# Patient Record
Sex: MALE | Race: WHITE | NOT HISPANIC OR LATINO | Employment: OTHER | URBAN - METROPOLITAN AREA
[De-identification: names, ages, dates, MRNs, and addresses within clinical notes are randomized per-mention and may not be internally consistent; named-entity substitution may affect disease eponyms.]

---

## 2017-01-20 DIAGNOSIS — M47.816 OSTEOARTHRITIS OF LUMBAR SPINE, UNSPECIFIED SPINAL OSTEOARTHRITIS COMPLICATION STATUS: ICD-10-CM

## 2017-01-23 RX ORDER — HYDROCODONE BITARTRATE AND ACETAMINOPHEN 5; 325 MG/1; MG/1
1 TABLET ORAL EVERY 6 HOURS PRN
Qty: 60 TABLET | Refills: 0 | Status: SHIPPED | OUTPATIENT
Start: 2017-01-23 | End: 2017-02-23 | Stop reason: SDUPTHER

## 2017-01-30 ENCOUNTER — TELEPHONE (OUTPATIENT)
Dept: INTERNAL MEDICINE | Facility: CLINIC | Age: 82
End: 2017-01-30

## 2017-01-30 NOTE — TELEPHONE ENCOUNTER
Spoke with Daiana read the last 3 blood pressures don in our office. She rescheduled his appt for Wednesday and let me know if it still elevated.

## 2017-01-30 NOTE — TELEPHONE ENCOUNTER
----- Message from Bambi Sanchez sent at 1/30/2017 10:43 AM CST -----  Contact: Daiana with Dr. Rickey Temple  The patient's blood pressure is high. First reading 197/112; second 177/101; third 186/103; fourth 177/99; fifth 174/105.  Dr. Temple would like to know what his BP was in December. The patient says that some things happened this morning that spiked his BP.  Please call Dr. Temple's office [phone].    Thanks!

## 2017-02-01 ENCOUNTER — OFFICE VISIT (OUTPATIENT)
Dept: INTERNAL MEDICINE | Facility: CLINIC | Age: 82
End: 2017-02-01
Payer: MEDICARE

## 2017-02-01 ENCOUNTER — TELEPHONE (OUTPATIENT)
Dept: INTERNAL MEDICINE | Facility: CLINIC | Age: 82
End: 2017-02-01

## 2017-02-01 VITALS
BODY MASS INDEX: 26.65 KG/M2 | HEART RATE: 66 BPM | DIASTOLIC BLOOD PRESSURE: 90 MMHG | WEIGHT: 169.81 LBS | HEIGHT: 67 IN | SYSTOLIC BLOOD PRESSURE: 154 MMHG

## 2017-02-01 DIAGNOSIS — I10 ESSENTIAL HYPERTENSION: Primary | ICD-10-CM

## 2017-02-01 PROCEDURE — 99213 OFFICE O/P EST LOW 20 MIN: CPT | Mod: PBBFAC | Performed by: STUDENT IN AN ORGANIZED HEALTH CARE EDUCATION/TRAINING PROGRAM

## 2017-02-01 PROCEDURE — 99999 PR PBB SHADOW E&M-EST. PATIENT-LVL III: CPT | Mod: PBBFAC,GC,, | Performed by: STUDENT IN AN ORGANIZED HEALTH CARE EDUCATION/TRAINING PROGRAM

## 2017-02-01 PROCEDURE — 99212 OFFICE O/P EST SF 10 MIN: CPT | Mod: S$PBB,GC,, | Performed by: STUDENT IN AN ORGANIZED HEALTH CARE EDUCATION/TRAINING PROGRAM

## 2017-02-01 RX ORDER — LISINOPRIL 10 MG/1
10 TABLET ORAL DAILY
Qty: 90 TABLET | Refills: 3 | Status: SHIPPED | OUTPATIENT
Start: 2017-02-01 | End: 2017-03-10 | Stop reason: SDUPTHER

## 2017-02-01 NOTE — PROGRESS NOTES
Clinic Note  02/01/2017    Subjective:       Patient ID: Yogesh Shay is a 82 y.o. male being seen for urgent care appointment (HTN)    Chief Complaint: Hypertension (elevated bp per dentist, haven't been bp med in over a year)    HPI   Patient is a 83 y/o M with HTN, T2DM, OA of lumbar spine who presents with hypertensive urgency noted at outside dentist office (BP: 200/105). He was scheduled for a dental procedure which was delayed to the elevated BP.     On presentation to clinic, his BP was 178/100. On repeat exam/manual, exam was lower (see below). Patient denies mild headaches that resemble sinus headache he has had in the past. Headache severity has not increased in last 12-24 hours. He denies any change in vision, denies chest pain, SOB, light-headedness/dizziness. 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 He describes himself as compliant with medications, low-salt diet (wife also has CKD & HTN) and markedly physically active (own a farm).     On med review, he was noted to take HCTZ 25 mg and atenolol 50mg BID. In the past, patient claims he was on a third medication which was weaned off due to a controlled SBP <120 and associated symptoms of light-headedness and dizziness. This change had occurred over a year ago and in the past year, his BP has been well controlled. Although patient has a cuff at home, he does not use is regularly.     For the purposes of the urgent care visit, other co-morbidities such as T2DM, HLD, or osteoarthritis were not addressed.    Past Medical History   Diagnosis Date    BPH (benign prostatic hypertrophy)     Chronic LBP 8/8/2012    Diabetes mellitus     DJD (degenerative joint disease) of lumbar spine 8/8/2012    Hyperlipidemia     Hypertension     Left lumbar radiculopathy 8/8/2012    Osteoarthritis 8/8/2012     Primary osteoarthritis of both knees 8/8/2012     Past Surgical History   Procedure Laterality Date    Total knee arthroplasty  2/2010     Left    Lumbar fusion  11/2007      L3-L4 Transforaminal Lumbar Interbody Fusion    Cholecystectomy       Patient's Medications   New Prescriptions    LISINOPRIL 10 MG TABLET    Take 1 tablet (10 mg total) by mouth once daily.   Previous Medications    ASPIRIN 81 MG TAB    Take by mouth. 1 Tablet Oral Every day    ATENOLOL (TENORMIN) 50 MG TABLET    TAKE ONE AND ONE-HALF TABLETS TWICE A DAY    DICLOFENAC (CATAFLAM) 50 MG TABLET    TAKE 1 TABLET THREE TIMES A DAY    FLUOROURACIL (EFUDEX) 5 % CREAM    Apply thin film to both hands  2times per day for 2-3 weeks; d/c if area bleeding or ulcerated    FLUTICASONE (FLOVENT HFA) 220 MCG/ACTUATION INHALER    Inhale 2 puffs into the lungs 2 (two) times daily.    GABAPENTIN (NEURONTIN) 300 MG CAPSULE    TAKE 1 CAPSULE EVERY EVENING AT BEDTIME    HYDROCHLOROTHIAZIDE (HYDRODIURIL) 25 MG TABLET    TAKE 1 TABLET DAILY    HYDROCODONE-ACETAMINOPHEN 5-325MG (NORCO) 5-325 MG PER TABLET    Take 1 tablet by mouth every 6 (six) hours as needed for Pain.    METFORMIN (GLUCOPHAGE) 500 MG TABLET    TAKE 1 TABLET TWICE A DAY WITH MEALS    OMEPRAZOLE (PRILOSEC) 20 MG CAPSULE    Take 1 capsule (20 mg total) by mouth once daily.    PRAVASTATIN (PRAVACHOL) 10 MG TABLET    TAKE 1 TABLET DAILY    TAMSULOSIN (FLOMAX) 0.4 MG CP24    TAKE 1 CAPSULE DAILY   Modified Medications    No medications on file   Discontinued Medications    ALBUTEROL 90 MCG/ACTUATION INHALER    Inhale 1-2 puffs into the lungs every 6 (six) hours as needed for Wheezing.       Patient Active Problem List    Diagnosis Date Noted    Diabetic polyneuropathy associated with type 2 diabetes mellitus 08/31/2015    GERD (gastroesophageal reflux disease) 01/30/2015    AR (allergic rhinitis) 01/30/2015    Hyperlipidemia 12/05/2013    Diabetes mellitus with neurological manifestations,  "controlled 03/22/2013    Chronic LBP 08/08/2012     S/P L3-L4 Transforaminal Lumbar Interbody Fusion 11/2007      Left lumbar radiculopathy 08/08/2012    DJD (degenerative joint disease), lumbar 08/08/2012    Primary osteoarthritis of both knees 08/08/2012     S/P Lt TKA 2/10/2010      Osteoarthritis 08/08/2012     Review of Systems   Constitutional: Negative for fever.   Eyes: Negative for blurred vision.   Respiratory: Negative for cough, shortness of breath and wheezing.    Cardiovascular: Negative for chest pain, palpitations, orthopnea and claudication. Leg swelling: intermittently.   Gastrointestinal: Negative for abdominal pain, constipation, diarrhea, heartburn, nausea and vomiting.   Genitourinary: Negative for dysuria.   Musculoskeletal: Back pain: chronic.   Neurological: Negative for dizziness.   Psychiatric/Behavioral: The patient is not nervous/anxious.      Objective:      Visit Vitals    BP (!) 154/90    Pulse 66    Ht 5' 7" (1.702 m)    Wt 77 kg (169 lb 12.8 oz)    BMI 26.59 kg/m2     Body mass index is 26.59 kg/(m^2).    Physical Exam   Constitutional: He is oriented to person, place, and time. He appears well-developed and well-nourished.   HENT:   Head: Normocephalic.   Neck: No JVD present.   Cardiovascular: Normal rate, regular rhythm, normal heart sounds and intact distal pulses.    No murmur heard.  Pulmonary/Chest: Effort normal and breath sounds normal. No respiratory distress. He has no wheezes.   Abdominal: Soft. Bowel sounds are normal. He exhibits no distension. There is no tenderness.   Musculoskeletal: He exhibits no edema.   Neurological: He is alert and oriented to person, place, and time.   Skin: Skin is warm.   Psychiatric: He has a normal mood and affect. His behavior is normal. Judgment and thought content normal.       Repeat BP (manual): 154/90 & 150/105  BP via home cuff: 148/90    Assessment:         1. Essential hypertension           Plan:         Yogesh Wheatley " Laurita is a 82 y.o. male being seen for hypertension    Hypertension  - BP on manual exam: 154/90, likely exacerbated by recent events at work/home and (dental) pain. Scheduled to see dentist in 2 weeks  - adding lisinopril 10mg daily  - continuing atenolol and HCTZ at current dose  - RTC in 4 weeks for BP check; adjust dose of lisinopril as necessary (goal BP < 140/90)  - patient will take BP at home and keep logs (discrepancy between home-cuff & clinic-cuff/manual minimal) when BP is taken at right position  - encouraged low salt diet & physical activity    Patient seen and plan of care discussed with Dr. Jovi Aguila MD  Internal Medicine, PGY-1  [phone]

## 2017-02-01 NOTE — TELEPHONE ENCOUNTER
----- Message from Fadia Romero MA sent at 2/1/2017  1:59 PM CST -----  Contact: Daiana/ Dr.Charles Temple's office [phone]      ----- Message -----     From: Christa Catalan     Sent: 2/1/2017   1:45 PM       To: Reba Lynn Staff    Nurse called to ask for advice about this patient. He is in the office and bp reading is 171/105. She spoke with Hannah on Monday and is calling back to f/u on that conversation. Call forwarded to Hannah.

## 2017-02-01 NOTE — MR AVS SNAPSHOT
[facility]  [address]  [facility] [address]  Phone: [phone]  Fax: [phone]                  Yogesh Shay   2017 3:15 PM   Office Visit    Description:  Male : 1934   Provider:  Ynes Aguila MD   Department:  [facility]           Reason for Visit     Hypertension           Diagnoses this Visit        Comments    Essential hypertension    -  Primary            To Do List           Goals (5 Years of Data)     None      Follow-Up and Disposition     Return in about 4 weeks (around 3/1/2017).       These Medications        Disp Refills Start End    lisinopril 10 MG tablet 90 tablet 3 2017    Take 1 tablet (10 mg total) by mouth once daily. - Oral    [place] [facility] [place] #: [phone]         [facility] On Call     [facility] On Call Nurse Care Line -  Assistance  Registered nurses in the [facility] On Call Center provide clinical advisement, health education, appointment booking, and other advisory services.  Call for this free service at [phone].             Medications           Message regarding Medications     Verify the changes and/or additions to your medication regime listed below are the same as discussed with your clinician today.  If any of these changes or additions are incorrect, please notify your healthcare provider.        START taking these NEW medications        Refills    lisinopril 10 MG tablet 3    Sig: Take 1 tablet (10 mg total) by mouth once daily.    Class: Normal    Route: Oral      STOP taking these medications     albuterol 90 mcg/actuation inhaler Inhale 1-2 puffs into the lungs every 6 (six) hours as needed for Wheezing.           Verify that the below list of medications is an accurate representation of the medications you are currently taking.  If none reported, the list may be blank. If incorrect, please contact your healthcare  "provider. Carry this list with you in case of emergency.           Current Medications     aspirin 81 mg Tab Take by mouth. 1 Tablet Oral Every day    atenolol (TENORMIN) 50 MG tablet TAKE ONE AND ONE-HALF TABLETS TWICE A DAY    diclofenac (CATAFLAM) 50 MG tablet TAKE 1 TABLET THREE TIMES A DAY    fluorouracil (EFUDEX) 5 % cream Apply thin film to both hands  2times per day for 2-3 weeks; d/c if area bleeding or ulcerated    fluticasone (FLOVENT HFA) 220 mcg/actuation inhaler Inhale 2 puffs into the lungs 2 (two) times daily.    gabapentin (NEURONTIN) 300 MG capsule TAKE 1 CAPSULE EVERY EVENING AT BEDTIME    hydrochlorothiazide (HYDRODIURIL) 25 MG tablet TAKE 1 TABLET DAILY    hydrocodone-acetaminophen 5-325mg (NORCO) 5-325 mg per tablet Take 1 tablet by mouth every 6 (six) hours as needed for Pain.    lisinopril 10 MG tablet Take 1 tablet (10 mg total) by mouth once daily.    metformin (GLUCOPHAGE) 500 MG tablet TAKE 1 TABLET TWICE A DAY WITH MEALS    omeprazole (PRILOSEC) 20 MG capsule Take 1 capsule (20 mg total) by mouth once daily.    pravastatin (PRAVACHOL) 10 MG tablet TAKE 1 TABLET DAILY    tamsulosin (FLOMAX) 0.4 mg Cp24 TAKE 1 CAPSULE DAILY           Clinical Reference Information           Vital Signs - Last Recorded  Most recent update: 2/1/2017  4:05 PM by Gianfranco Dunne MA    BP Pulse Ht Wt BMI    (!) 178/100 66 5' 7" (1.702 m) 77 kg (169 lb 12.8 oz) 26.59 kg/m2      Blood Pressure          Most Recent Value    BP  (!)  178/100      Allergies as of 2/1/2017     Sulfa (Sulfonamide Antibiotics)      Immunizations Administered on Date of Encounter - 2/1/2017     None      "

## 2017-02-07 NOTE — PROGRESS NOTES
Preceptor note    Patient's history and physical discussed, please refer to resident physician's note for specific details. Pt seen with resident physician. Medical record reviewed  I agree with resident's assessment and plan.

## 2017-02-15 RX ORDER — METFORMIN HYDROCHLORIDE 500 MG/1
TABLET ORAL
Qty: 180 TABLET | Refills: 3 | Status: SHIPPED | OUTPATIENT
Start: 2017-02-15 | End: 2018-03-27 | Stop reason: SDUPTHER

## 2017-02-23 DIAGNOSIS — M47.816 OSTEOARTHRITIS OF LUMBAR SPINE, UNSPECIFIED SPINAL OSTEOARTHRITIS COMPLICATION STATUS: ICD-10-CM

## 2017-02-23 RX ORDER — HYDROCODONE BITARTRATE AND ACETAMINOPHEN 5; 325 MG/1; MG/1
1 TABLET ORAL EVERY 6 HOURS PRN
Qty: 60 TABLET | Refills: 0 | Status: SHIPPED | OUTPATIENT
Start: 2017-02-23 | End: 2017-03-22 | Stop reason: SDUPTHER

## 2017-02-23 NOTE — TELEPHONE ENCOUNTER
----- Message from Jelani Antony sent at 2/23/2017  9:48 AM CST -----  Contact: self [phone]  Type: Rx    Name of medication(s):  hydrocodone-acetaminophen 5-325mg (NORCO) 5-325 mg per tablet    Is this a refill? New rx? Refill     Who prescribed medication?    Pharmacy Name, Phone, & Location: [facility] on file     Comments: please advise, Thanks !

## 2017-03-10 ENCOUNTER — OFFICE VISIT (OUTPATIENT)
Dept: INTERNAL MEDICINE | Facility: CLINIC | Age: 82
End: 2017-03-10
Payer: MEDICARE

## 2017-03-10 ENCOUNTER — LAB VISIT (OUTPATIENT)
Dept: LAB | Facility: HOSPITAL | Age: 82
End: 2017-03-10
Payer: MEDICARE

## 2017-03-10 VITALS
BODY MASS INDEX: 26.19 KG/M2 | OXYGEN SATURATION: 96 % | HEART RATE: 68 BPM | HEIGHT: 67 IN | WEIGHT: 166.88 LBS | SYSTOLIC BLOOD PRESSURE: 128 MMHG | DIASTOLIC BLOOD PRESSURE: 82 MMHG

## 2017-03-10 DIAGNOSIS — T46.4X5A ACE INHIBITOR NEPHROTOXICITY: ICD-10-CM

## 2017-03-10 DIAGNOSIS — I16.0 HYPERTENSIVE URGENCY: ICD-10-CM

## 2017-03-10 DIAGNOSIS — I10 ESSENTIAL HYPERTENSION: Primary | ICD-10-CM

## 2017-03-10 DIAGNOSIS — N14.19 ACE INHIBITOR NEPHROTOXICITY: ICD-10-CM

## 2017-03-10 LAB
ANION GAP SERPL CALC-SCNC: 11 MMOL/L
BUN SERPL-MCNC: 22 MG/DL
CALCIUM SERPL-MCNC: 9.8 MG/DL
CHLORIDE SERPL-SCNC: 97 MMOL/L
CO2 SERPL-SCNC: 25 MMOL/L
CREAT SERPL-MCNC: 1 MG/DL
EST. GFR  (AFRICAN AMERICAN): >60 ML/MIN/1.73 M^2
EST. GFR  (NON AFRICAN AMERICAN): >60 ML/MIN/1.73 M^2
GLUCOSE SERPL-MCNC: 103 MG/DL
POTASSIUM SERPL-SCNC: 4.4 MMOL/L
SODIUM SERPL-SCNC: 133 MMOL/L

## 2017-03-10 PROCEDURE — 36415 COLL VENOUS BLD VENIPUNCTURE: CPT

## 2017-03-10 PROCEDURE — 99999 PR PBB SHADOW E&M-EST. PATIENT-LVL III: CPT | Mod: PBBFAC,GC,, | Performed by: STUDENT IN AN ORGANIZED HEALTH CARE EDUCATION/TRAINING PROGRAM

## 2017-03-10 PROCEDURE — 80048 BASIC METABOLIC PNL TOTAL CA: CPT

## 2017-03-10 PROCEDURE — 99212 OFFICE O/P EST SF 10 MIN: CPT | Mod: S$PBB,GC,, | Performed by: STUDENT IN AN ORGANIZED HEALTH CARE EDUCATION/TRAINING PROGRAM

## 2017-03-10 RX ORDER — LISINOPRIL 10 MG/1
10 TABLET ORAL DAILY
Qty: 90 TABLET | Refills: 3 | Status: SHIPPED | OUTPATIENT
Start: 2017-03-10 | End: 2018-03-26 | Stop reason: SDUPTHER

## 2017-03-10 NOTE — MR AVS SNAPSHOT
[facility]  [address]  [facility] [address]  Phone: [phone]  Fax: [phone]                  Yogesh Shay   3/10/2017 1:15 PM   Office Visit    Description:  Male : 1934   Provider:  Ynes Aguila MD   Department:  [facility]           Reason for Visit     Follow-up           Diagnoses this Visit        Comments    ACE inhibitor nephrotoxicity    -  Primary            To Do List           Future Appointments        Provider Department Dept Phone    3/10/2017 2:20 PM LAB, APPOINTMENT [facility] [phone]      Goals (5 Years of Data)     None       These Medications        Disp Refills Start End    lisinopril 10 MG tablet 90 tablet 3 3/10/2017 3/10/2018    Take 1 tablet (10 mg total) by mouth once daily. - Oral    Pharmacy: [facility] - [address] #: [phone]         [facility] On Call     [facility] On Call Nurse Care Line -  Assistance  Registered nurses in the [facility] On Call Center provide clinical advisement, health education, appointment booking, and other advisory services.  Call for this free service at [phone].             Medications           Message regarding Medications     Verify the changes and/or additions to your medication regime listed below are the same as discussed with your clinician today.  If any of these changes or additions are incorrect, please notify your healthcare provider.        STOP taking these medications     fluorouracil (EFUDEX) 5 % cream Apply thin film to both hands  2times per day for 2-3 weeks; d/c if area bleeding or ulcerated    fluticasone (FLOVENT HFA) 220 mcg/actuation inhaler Inhale 2 puffs into the lungs 2 (two) times daily.           Verify that the below list of medications is an accurate representation of the medications you are currently taking.  If none reported, the list may be blank. If  "incorrect, please contact your healthcare provider. Carry this list with you in case of emergency.           Current Medications     aspirin 81 mg Tab Take by mouth. 1 Tablet Oral Every day    atenolol (TENORMIN) 50 MG tablet TAKE ONE AND ONE-HALF TABLETS TWICE A DAY    diclofenac (CATAFLAM) 50 MG tablet TAKE 1 TABLET THREE TIMES A DAY    gabapentin (NEURONTIN) 300 MG capsule TAKE 1 CAPSULE EVERY EVENING AT BEDTIME    hydrochlorothiazide (HYDRODIURIL) 25 MG tablet TAKE 1 TABLET DAILY    hydrocodone-acetaminophen 5-325mg (NORCO) 5-325 mg per tablet Take 1 tablet by mouth every 6 (six) hours as needed for Pain.    lisinopril 10 MG tablet Take 1 tablet (10 mg total) by mouth once daily.    metformin (GLUCOPHAGE) 500 MG tablet TAKE 1 TABLET TWICE A DAY WITH MEALS    omeprazole (PRILOSEC) 20 MG capsule Take 1 capsule (20 mg total) by mouth once daily.    pravastatin (PRAVACHOL) 10 MG tablet TAKE 1 TABLET DAILY    tamsulosin (FLOMAX) 0.4 mg Cp24 TAKE 1 CAPSULE DAILY           Clinical Reference Information           Your Vitals Were     BP Pulse Height Weight SpO2 BMI    128/82 (BP Location: Right arm, Patient Position: Sitting, BP Method: Manual) 68 5' 7" (1.702 m) 75.7 kg (166 lb 14.2 oz) 96% 26.14 kg/m2      Blood Pressure          Most Recent Value    BP  128/82      Allergies as of 3/10/2017     Sulfa (Sulfonamide Antibiotics)      Immunizations Administered on Date of Encounter - 3/10/2017     None      Orders Placed During Today's Visit     Future Labs/Procedures Expected by Expires    Basic metabolic panel  3/10/2017 5/9/2018      Language Assistance Services     ATTENTION: Language assistance services are available, free of charge. Please call [phone].      ATENCIÓN: Si habla español, tiene a armas disposición servicios gratuitos de asistencia lingüística. Llame al [phone].     YVETTE Ý: N?u b?n nói Ti?ng Vi?t, có các d?ch v? h? tr? ngôn ng? mi?n phí dành cho b?n. G?i s? [phone].         [facility] complies with applicable Federal civil rights laws and does not discriminate on the basis of race, color, national origin, age, disability, or sex.

## 2017-03-10 NOTE — PROGRESS NOTES
Clinic Progress Note  03/10/2017    Subjective:       Patient ID: Yogesh Shay is a 82 y.o. male being seen for follow up appointment for BP.    Chief Complaint: Follow-up    HPI   Patient is a 83 y/o M with HTN, T2DM, OA/DDD of lumbar spine who presents for follow up for hypertensive urgency.     On last visit 2/1/17, he had presented with elevated BP at dentist's office causing dentist to delay the procedure until BP was better control. His BP in clinic was 178/100 on HCTZ 25mg and atenolol 50mg BID. He did not have any symptoms to suggest end organ damage and inferred lack of health insurance and financial losses as stress adding to elevated BP. Given his h/o diabetes, he was started on lisinopril 10mg daily and encouraged to record daily pressures.     On this visit, he presents with BP: 128/82. He has been compliant with his medications and brings in a log with consistent BP recorded at home, ranging -160s one exception 186. His BP is higher in the morning that it is in the evening. As for symptoms - he has chronic headache and mild dizziness, neither of which have increased in severity or duration. He denies any change in vision, chest pain, SOB. He has mild leg edema that occurs during the day and resolves at night. The edema has also not progressed/worsened.     * Patient did not have new symptoms and co-morbidities (T2DM, chronic pain syndrome, HLD) were not discussed in detail as this was a visit for BP follow up. He will resume care for those by his PCP. No med refills required by pt.    Past Medical History:   Diagnosis Date    BPH (benign prostatic hypertrophy)     Chronic LBP 8/8/2012    Diabetes mellitus     DJD (degenerative joint disease) of lumbar spine 8/8/2012    Hyperlipidemia     Hypertension     Left lumbar radiculopathy 8/8/2012    Osteoarthritis 8/8/2012    Primary osteoarthritis of both knees 8/8/2012     Past Surgical History:   Procedure Laterality Date     CHOLECYSTECTOMY      LUMBAR FUSION  11/2007     L3-L4 Transforaminal Lumbar Interbody Fusion    TOTAL KNEE ARTHROPLASTY  2/2010    Left     Patient's Medications   New Prescriptions    No medications on file   Previous Medications    ASPIRIN 81 MG TAB    Take by mouth. 1 Tablet Oral Every day    ATENOLOL (TENORMIN) 50 MG TABLET    TAKE ONE AND ONE-HALF TABLETS TWICE A DAY    DICLOFENAC (CATAFLAM) 50 MG TABLET    TAKE 1 TABLET THREE TIMES A DAY    GABAPENTIN (NEURONTIN) 300 MG CAPSULE    TAKE 1 CAPSULE EVERY EVENING AT BEDTIME    HYDROCHLOROTHIAZIDE (HYDRODIURIL) 25 MG TABLET    TAKE 1 TABLET DAILY    HYDROCODONE-ACETAMINOPHEN 5-325MG (NORCO) 5-325 MG PER TABLET    Take 1 tablet by mouth every 6 (six) hours as needed for Pain.    METFORMIN (GLUCOPHAGE) 500 MG TABLET    TAKE 1 TABLET TWICE A DAY WITH MEALS    OMEPRAZOLE (PRILOSEC) 20 MG CAPSULE    Take 1 capsule (20 mg total) by mouth once daily.    PRAVASTATIN (PRAVACHOL) 10 MG TABLET    TAKE 1 TABLET DAILY    TAMSULOSIN (FLOMAX) 0.4 MG CP24    TAKE 1 CAPSULE DAILY   Modified Medications    Modified Medication Previous Medication    LISINOPRIL 10 MG TABLET lisinopril 10 MG tablet       Take 1 tablet (10 mg total) by mouth once daily.    Take 1 tablet (10 mg total) by mouth once daily.   Discontinued Medications    FLUOROURACIL (EFUDEX) 5 % CREAM    Apply thin film to both hands  2times per day for 2-3 weeks; d/c if area bleeding or ulcerated    FLUTICASONE (FLOVENT HFA) 220 MCG/ACTUATION INHALER    Inhale 2 puffs into the lungs 2 (two) times daily.     Review of Systems   Constitutional: Negative for fever.   Eyes: Negative for blurred vision.   Respiratory: Negative for cough, shortness of breath and wheezing.    Cardiovascular: Negative for chest pain, palpitations, orthopnea and claudication. Leg swelling: intermittently - during daytime and resolves with night.   Gastrointestinal: Negative for abdominal pain, constipation, diarrhea, heartburn, nausea and  "vomiting.   Genitourinary: Negative for dysuria.   Musculoskeletal: Back pain: chronic.   Neurological: Negative for dizziness and weakness.   Psychiatric/Behavioral: The patient is not nervous/anxious.      Objective:      /82 (BP Location: Right arm, Patient Position: Sitting, BP Method: Manual)  Pulse 68  Ht 5' 7" (1.702 m)  Wt 75.7 kg (166 lb 14.2 oz)  SpO2 96%  BMI 26.14 kg/m2  Body mass index is 26.14 kg/(m^2).    Physical Exam   Constitutional: He is oriented to person, place, and time. He appears well-developed and well-nourished.   HENT:   Head: Normocephalic.   Neck: No JVD present.   Cardiovascular: Normal rate, regular rhythm, normal heart sounds and intact distal pulses.    No murmur heard.  Pulmonary/Chest: Effort normal and breath sounds normal. No respiratory distress. He has no wheezes.   Abdominal: Soft. Bowel sounds are normal. He exhibits no distension. There is no tenderness.   Musculoskeletal: He exhibits no edema.   Neurological: He is alert and oriented to person, place, and time.   Skin: Skin is warm.   Psychiatric: He has a normal mood and affect. His behavior is normal. Judgment and thought content normal.       Repeat BP (manual): 154/90 & 150/105  BP via home cuff: 148/90    Assessment:         1. Essential hypertension  Basic metabolic panel    lisinopril 10 MG tablet   2. Hypertensive urgency  Basic metabolic panel    lisinopril 10 MG tablet         Plan:         Yogesh Shay is a 82 y.o. male being seen for hypertension    Hypertension  - BP on exam: 128/82   - hypertensive urgency -> resolved  - will continue HCTZ 25mg, atenolol 50mg and lisinopril 10mg  - BMP ordered to monitor BUN/Cr s/p ACEi initiation  - recommended continue BP recordings at home (once/day) to note high or low in BP  - patient assured he can go back to dentist and get dental procedure  - encouraged low salt diet & physical activity    May RTC if BP low/high or symptoms develop, else follow up " with Dr. Root (PCP) as scheduled (anticipated appt: Jun '17)    Patient seen and plan of care discussed with Dr. Adeel Aguila MD  Internal Medicine, PGY-1  [phone]

## 2017-03-15 ENCOUNTER — TELEPHONE (OUTPATIENT)
Dept: INTERNAL MEDICINE | Facility: CLINIC | Age: 82
End: 2017-03-15

## 2017-03-15 NOTE — TELEPHONE ENCOUNTER
I called Mr. Shay to let him know the results of his kidney function s/p ACEi initiation were normal. I was unable to speak to him, but conveyed the message to his wife (Lida), who had accompanied patient to the clinic the last 2 times, and patient had agreed to share information with her.  She had no further questions.    Ynes Aguila MD  Internal Medicine, PGY-1  [phone]

## 2017-03-20 RX ORDER — GABAPENTIN 300 MG/1
CAPSULE ORAL
Qty: 90 CAPSULE | Refills: 3 | Status: SHIPPED | OUTPATIENT
Start: 2017-03-20 | End: 2019-02-28 | Stop reason: SDUPTHER

## 2017-03-22 DIAGNOSIS — M47.816 OSTEOARTHRITIS OF LUMBAR SPINE, UNSPECIFIED SPINAL OSTEOARTHRITIS COMPLICATION STATUS: ICD-10-CM

## 2017-03-22 RX ORDER — HYDROCODONE BITARTRATE AND ACETAMINOPHEN 5; 325 MG/1; MG/1
1 TABLET ORAL EVERY 6 HOURS PRN
Qty: 60 TABLET | Refills: 0 | Status: SHIPPED | OUTPATIENT
Start: 2017-03-22 | End: 2017-04-24 | Stop reason: SDUPTHER

## 2017-03-22 NOTE — TELEPHONE ENCOUNTER
----- Message from Liat Gutierrez sent at 3/22/2017 10:56 AM CDT -----  Contact: Lida/ Wife/ [phone]   Type: Rx    Name of medication(s): hydrocodone-acetaminophen 5-325mg (NORCO) 5-325 mg per tablet    Is this a refill? New rx? Refill     Who prescribed medication? Dr. Root     Pharmacy Name, Phone, & Location: [facility]     Comments:pt wife is calling to have a refill on the medication above. Please call and advise       Thank you

## 2017-04-11 DIAGNOSIS — K21.9 GERD (GASTROESOPHAGEAL REFLUX DISEASE): ICD-10-CM

## 2017-04-11 RX ORDER — OMEPRAZOLE 20 MG/1
20 CAPSULE, DELAYED RELEASE ORAL DAILY
Qty: 90 CAPSULE | Refills: 3 | Status: SHIPPED | OUTPATIENT
Start: 2017-04-11 | End: 2018-03-18 | Stop reason: SDUPTHER

## 2017-04-21 DIAGNOSIS — M47.816 OSTEOARTHRITIS OF LUMBAR SPINE, UNSPECIFIED SPINAL OSTEOARTHRITIS COMPLICATION STATUS: ICD-10-CM

## 2017-04-21 RX ORDER — HYDROCODONE BITARTRATE AND ACETAMINOPHEN 5; 325 MG/1; MG/1
1 TABLET ORAL EVERY 6 HOURS PRN
Qty: 60 TABLET | Refills: 0 | Status: CANCELLED | OUTPATIENT
Start: 2017-04-21

## 2017-04-21 NOTE — TELEPHONE ENCOUNTER
----- Message from Bambi Sanchez sent at 4/21/2017  9:08 AM CDT -----  Contact: Mrs. Shay   Refill    hydrocodone-acetaminophen 5-325mg (NORCO) 5-325 mg per tablet   Sig - Route: Take 1 tablet by mouth every 6 (six) hours as needed for     [facility] [place] [address] [phone] (Phone)  [phone] (Fax)    Please call the patient to let him know the prescription was sent at [phone].    Thanks!

## 2017-04-24 DIAGNOSIS — M47.816 OSTEOARTHRITIS OF LUMBAR SPINE, UNSPECIFIED SPINAL OSTEOARTHRITIS COMPLICATION STATUS: ICD-10-CM

## 2017-04-24 RX ORDER — HYDROCODONE BITARTRATE AND ACETAMINOPHEN 5; 325 MG/1; MG/1
1 TABLET ORAL EVERY 6 HOURS PRN
Qty: 60 TABLET | Refills: 0 | Status: SHIPPED | OUTPATIENT
Start: 2017-04-24 | End: 2017-05-22 | Stop reason: SDUPTHER

## 2017-04-24 NOTE — TELEPHONE ENCOUNTER
----- Message from Fransico Agustin MA sent at 4/24/2017  9:01 AM CDT -----  Contact: wife / Lida - please call patient at [phone]  Please advise the status if the Refill request for hydrocodone-acetaminophen 5-325mg (NORCO) 5-325 mg per tablet. Please call. Thanks!

## 2017-05-16 ENCOUNTER — LAB VISIT (OUTPATIENT)
Dept: LAB | Facility: HOSPITAL | Age: 82
End: 2017-05-16
Attending: INTERNAL MEDICINE
Payer: MEDICARE

## 2017-05-16 DIAGNOSIS — E11.49 DIABETES MELLITUS WITH NEUROLOGICAL MANIFESTATIONS, CONTROLLED: ICD-10-CM

## 2017-05-16 DIAGNOSIS — E11.42 CONTROLLED TYPE 2 DIABETES MELLITUS WITH DIABETIC POLYNEUROPATHY, WITHOUT LONG-TERM CURRENT USE OF INSULIN: ICD-10-CM

## 2017-05-16 LAB
ALBUMIN SERPL BCP-MCNC: 3.9 G/DL
ALP SERPL-CCNC: 53 U/L
ALT SERPL W/O P-5'-P-CCNC: 11 U/L
ANION GAP SERPL CALC-SCNC: 11 MMOL/L
AST SERPL-CCNC: 17 U/L
BASOPHILS # BLD AUTO: 0.01 K/UL
BASOPHILS NFR BLD: 0.2 %
BILIRUB SERPL-MCNC: 0.4 MG/DL
BUN SERPL-MCNC: 18 MG/DL
CALCIUM SERPL-MCNC: 9.6 MG/DL
CHLORIDE SERPL-SCNC: 99 MMOL/L
CHOLEST/HDLC SERPL: 3.3 {RATIO}
CO2 SERPL-SCNC: 24 MMOL/L
CREAT SERPL-MCNC: 0.9 MG/DL
DIFFERENTIAL METHOD: ABNORMAL
EOSINOPHIL # BLD AUTO: 0.1 K/UL
EOSINOPHIL NFR BLD: 1.6 %
ERYTHROCYTE [DISTWIDTH] IN BLOOD BY AUTOMATED COUNT: 14.1 %
EST. GFR  (AFRICAN AMERICAN): >60 ML/MIN/1.73 M^2
EST. GFR  (NON AFRICAN AMERICAN): >60 ML/MIN/1.73 M^2
GLUCOSE SERPL-MCNC: 117 MG/DL
HCT VFR BLD AUTO: 37.8 %
HDL/CHOLESTEROL RATIO: 30.7 %
HDLC SERPL-MCNC: 153 MG/DL
HDLC SERPL-MCNC: 47 MG/DL
HGB BLD-MCNC: 12.8 G/DL
LDLC SERPL CALC-MCNC: 79.6 MG/DL
LYMPHOCYTES # BLD AUTO: 1.9 K/UL
LYMPHOCYTES NFR BLD: 34.2 %
MCH RBC QN AUTO: 32.4 PG
MCHC RBC AUTO-ENTMCNC: 33.9 %
MCV RBC AUTO: 96 FL
MONOCYTES # BLD AUTO: 0.5 K/UL
MONOCYTES NFR BLD: 8.7 %
NEUTROPHILS # BLD AUTO: 3.1 K/UL
NEUTROPHILS NFR BLD: 55.1 %
NONHDLC SERPL-MCNC: 106 MG/DL
PLATELET # BLD AUTO: 192 K/UL
PMV BLD AUTO: 9.9 FL
POTASSIUM SERPL-SCNC: 4.3 MMOL/L
PROT SERPL-MCNC: 7.3 G/DL
RBC # BLD AUTO: 3.95 M/UL
SODIUM SERPL-SCNC: 134 MMOL/L
TRIGL SERPL-MCNC: 132 MG/DL
WBC # BLD AUTO: 5.61 K/UL

## 2017-05-16 PROCEDURE — 80053 COMPREHEN METABOLIC PANEL: CPT

## 2017-05-16 PROCEDURE — 36415 COLL VENOUS BLD VENIPUNCTURE: CPT | Mod: PO

## 2017-05-16 PROCEDURE — 83036 HEMOGLOBIN GLYCOSYLATED A1C: CPT

## 2017-05-16 PROCEDURE — 85025 COMPLETE CBC W/AUTO DIFF WBC: CPT

## 2017-05-16 PROCEDURE — 80061 LIPID PANEL: CPT

## 2017-05-17 LAB
ESTIMATED AVG GLUCOSE: 126 MG/DL
ESTIMATED AVG GLUCOSE: 126 MG/DL
HBA1C MFR BLD HPLC: 6 %
HBA1C MFR BLD HPLC: 6 %

## 2017-05-22 ENCOUNTER — OFFICE VISIT (OUTPATIENT)
Dept: INTERNAL MEDICINE | Facility: CLINIC | Age: 82
End: 2017-05-22
Payer: MEDICARE

## 2017-05-22 VITALS
SYSTOLIC BLOOD PRESSURE: 118 MMHG | BODY MASS INDEX: 25.26 KG/M2 | DIASTOLIC BLOOD PRESSURE: 78 MMHG | HEIGHT: 67 IN | WEIGHT: 160.94 LBS

## 2017-05-22 DIAGNOSIS — E11.42 CONTROLLED TYPE 2 DIABETES MELLITUS WITH DIABETIC POLYNEUROPATHY, WITHOUT LONG-TERM CURRENT USE OF INSULIN: Primary | ICD-10-CM

## 2017-05-22 DIAGNOSIS — M47.816 OSTEOARTHRITIS OF LUMBAR SPINE, UNSPECIFIED SPINAL OSTEOARTHRITIS COMPLICATION STATUS: ICD-10-CM

## 2017-05-22 PROCEDURE — G0009 ADMIN PNEUMOCOCCAL VACCINE: HCPCS | Mod: PBBFAC

## 2017-05-22 PROCEDURE — 99999 PR PBB SHADOW E&M-EST. PATIENT-LVL III: CPT | Mod: PBBFAC,,, | Performed by: INTERNAL MEDICINE

## 2017-05-22 PROCEDURE — 99214 OFFICE O/P EST MOD 30 MIN: CPT | Mod: S$PBB,,, | Performed by: INTERNAL MEDICINE

## 2017-05-22 PROCEDURE — 99213 OFFICE O/P EST LOW 20 MIN: CPT | Mod: PBBFAC | Performed by: INTERNAL MEDICINE

## 2017-05-22 PROCEDURE — 90732 PPSV23 VACC 2 YRS+ SUBQ/IM: CPT | Mod: PBBFAC | Performed by: INTERNAL MEDICINE

## 2017-05-22 RX ORDER — HYDROCODONE BITARTRATE AND ACETAMINOPHEN 5; 325 MG/1; MG/1
1 TABLET ORAL EVERY 6 HOURS PRN
Qty: 60 TABLET | Refills: 0 | Status: SHIPPED | OUTPATIENT
Start: 2017-05-22 | End: 2017-06-21 | Stop reason: SDUPTHER

## 2017-05-23 PROCEDURE — G0009 ADMIN PNEUMOCOCCAL VACCINE: HCPCS | Mod: S$PBB,,, | Performed by: INTERNAL MEDICINE

## 2017-05-23 NOTE — PROGRESS NOTES
Subjective:       Patient ID: Yogesh Shay is a 82 y.o. male.    Chief Complaint: Diabetes    Diabetes   He presents for his follow-up diabetic visit. He has type 2 diabetes mellitus. His disease course has been stable. There are no hypoglycemic associated symptoms. There are no diabetic associated symptoms. Pertinent negatives for diabetes include no chest pain, no polydipsia, no polyphagia and no polyuria. There are no hypoglycemic complications. Symptoms are stable.     Review of Systems   Cardiovascular: Negative for chest pain.   Endocrine: Negative for polydipsia, polyphagia and polyuria.       Objective:      Physical Exam   Constitutional: He is oriented to person, place, and time. He appears well-developed and well-nourished. No distress.   HENT:   Head: Normocephalic and atraumatic.   Mouth/Throat: Oropharynx is clear and moist.   Eyes: Conjunctivae are normal. Pupils are equal, round, and reactive to light.   Neck: Normal range of motion. Neck supple.   Cardiovascular: Normal rate, regular rhythm and normal heart sounds.    Pulmonary/Chest: Effort normal and breath sounds normal. He has no wheezes.   Abdominal: Soft. Bowel sounds are normal. There is no tenderness.   Musculoskeletal: Normal range of motion. He exhibits no edema or tenderness.   Neurological: He is alert and oriented to person, place, and time. No cranial nerve deficit.   Skin: No erythema.   Psychiatric: He has a normal mood and affect.   Vitals reviewed.      Assessment:       1. Controlled type 2 diabetes mellitus with diabetic polyneuropathy, without long-term current use of insulin    2. Osteoarthritis of lumbar spine, unspecified spinal osteoarthritis complication status        Plan:       Yogesh was seen today for diabetes.    Diagnoses and all orders for this visit:    Controlled type 2 diabetes mellitus with diabetic polyneuropathy, without long-term current use of insulin  -     hydrocodone-acetaminophen 5-325mg (NORCO)  5-325 mg per tablet; Take 1 tablet by mouth every 6 (six) hours as needed for Pain.  -     Comprehensive metabolic panel; Future  -     Hemoglobin A1c; Future  -     Lipid panel; Future  -     CBC auto differential; Future  -     Pneumococcal Polysaccharide Vaccine (23 Valent) (SQ/IM)    Osteoarthritis of lumbar spine, unspecified spinal osteoarthritis complication status  -     hydrocodone-acetaminophen 5-325mg (NORCO) 5-325 mg per tablet; Take 1 tablet by mouth every 6 (six) hours as needed for Pain.  -     Pneumococcal Polysaccharide Vaccine (23 Valent) (SQ/IM)        Return in about 6 months (around 11/22/2017) for F/U APPOINTMENT WITH ME.

## 2017-06-14 RX ORDER — PRAVASTATIN SODIUM 10 MG/1
TABLET ORAL
Qty: 90 TABLET | Refills: 3 | Status: SHIPPED | OUTPATIENT
Start: 2017-06-14 | End: 2018-06-08 | Stop reason: SDUPTHER

## 2017-06-14 RX ORDER — DICLOFENAC POTASSIUM 50 MG/1
TABLET, FILM COATED ORAL
Qty: 270 TABLET | Refills: 3 | Status: SHIPPED | OUTPATIENT
Start: 2017-06-14 | End: 2018-06-08 | Stop reason: SDUPTHER

## 2017-06-14 RX ORDER — HYDROCHLOROTHIAZIDE 25 MG/1
TABLET ORAL
Qty: 90 TABLET | Refills: 3 | Status: SHIPPED | OUTPATIENT
Start: 2017-06-14 | End: 2018-01-31

## 2017-06-21 DIAGNOSIS — E11.42 CONTROLLED TYPE 2 DIABETES MELLITUS WITH DIABETIC POLYNEUROPATHY, WITHOUT LONG-TERM CURRENT USE OF INSULIN: ICD-10-CM

## 2017-06-21 DIAGNOSIS — M47.816 OSTEOARTHRITIS OF LUMBAR SPINE, UNSPECIFIED SPINAL OSTEOARTHRITIS COMPLICATION STATUS: ICD-10-CM

## 2017-06-21 NOTE — TELEPHONE ENCOUNTER
----- Message from Jelani Antony sent at 6/21/2017 11:40 AM CDT -----  Contact: self [phone]  Type: Rx    Name of medication(s): hydrocodone-acetaminophen 5-325mg (NORCO) 5-325 mg per tablet    Is this a refill? New rx? Refill     Who prescribed medication?    Pharmacy Name, Phone, & Location:  [facility] on file     Comments: please advise, Thanks !  Please advise , Thanks !

## 2017-06-22 RX ORDER — HYDROCODONE BITARTRATE AND ACETAMINOPHEN 5; 325 MG/1; MG/1
1 TABLET ORAL EVERY 6 HOURS PRN
Qty: 60 TABLET | Refills: 0 | Status: SHIPPED | OUTPATIENT
Start: 2017-06-22 | End: 2017-07-21 | Stop reason: SDUPTHER

## 2017-07-07 RX ORDER — TAMSULOSIN HYDROCHLORIDE 0.4 MG/1
CAPSULE ORAL
Qty: 90 CAPSULE | Refills: 2 | Status: SHIPPED | OUTPATIENT
Start: 2017-07-07 | End: 2018-04-02 | Stop reason: SDUPTHER

## 2017-07-07 RX ORDER — ATENOLOL 50 MG/1
TABLET ORAL
Qty: 270 TABLET | Refills: 2 | Status: SHIPPED | OUTPATIENT
Start: 2017-07-07 | End: 2018-04-02 | Stop reason: SDUPTHER

## 2017-07-21 DIAGNOSIS — E11.42 CONTROLLED TYPE 2 DIABETES MELLITUS WITH DIABETIC POLYNEUROPATHY, WITHOUT LONG-TERM CURRENT USE OF INSULIN: ICD-10-CM

## 2017-07-21 DIAGNOSIS — M47.816 OSTEOARTHRITIS OF LUMBAR SPINE, UNSPECIFIED SPINAL OSTEOARTHRITIS COMPLICATION STATUS: ICD-10-CM

## 2017-07-21 NOTE — TELEPHONE ENCOUNTER
----- Message from John Yu sent at 7/21/2017 12:51 PM CDT -----  Contact: Lida Holden [phone]  Type: Rx    Name of medication(s): hydrocodone-acetaminophen 5-325mg (NORCO) 5-325 mg per tablet    Is this a refill? New rx? Refill    Who prescribed medication? Dr Root    Pharmacy Name, Phone, & Location:  [facility] on file    Comments:Requestig a call back, advice    Thanks

## 2017-07-24 RX ORDER — HYDROCODONE BITARTRATE AND ACETAMINOPHEN 5; 325 MG/1; MG/1
1 TABLET ORAL EVERY 6 HOURS PRN
Qty: 60 TABLET | Refills: 0 | Status: SHIPPED | OUTPATIENT
Start: 2017-07-24 | End: 2017-08-22 | Stop reason: SDUPTHER

## 2017-08-22 DIAGNOSIS — M47.816 OSTEOARTHRITIS OF LUMBAR SPINE, UNSPECIFIED SPINAL OSTEOARTHRITIS COMPLICATION STATUS: ICD-10-CM

## 2017-08-22 DIAGNOSIS — E11.42 CONTROLLED TYPE 2 DIABETES MELLITUS WITH DIABETIC POLYNEUROPATHY, WITHOUT LONG-TERM CURRENT USE OF INSULIN: ICD-10-CM

## 2017-08-22 RX ORDER — HYDROCODONE BITARTRATE AND ACETAMINOPHEN 5; 325 MG/1; MG/1
1 TABLET ORAL EVERY 6 HOURS PRN
Qty: 60 TABLET | Refills: 0 | Status: SHIPPED | OUTPATIENT
Start: 2017-08-22 | End: 2017-09-22 | Stop reason: SDUPTHER

## 2017-08-22 NOTE — TELEPHONE ENCOUNTER
----- Message from Kimberley Samson sent at 8/22/2017  9:01 AM CDT -----  Contact: Wife/Lida/[phone]  RX request - refill or new RX.  Is this a refill or new RX:  Refill  RX name and strength: hydrocodone-acetaminophen 5-325mg (NORCO) 5-325 mg per tablet  Directions: Take 1 tablet by mouth every 6 (six) hours as needed for Pain  Is this a 30 day or 90 day RX:    Pharmacy name and phone #: [facility] - [place]         [phone]   Comments:  Please call pt when the rx has been sent to the pharmacy        Thanks!!!

## 2017-09-22 DIAGNOSIS — M47.816 OSTEOARTHRITIS OF LUMBAR SPINE, UNSPECIFIED SPINAL OSTEOARTHRITIS COMPLICATION STATUS: ICD-10-CM

## 2017-09-22 DIAGNOSIS — E11.42 CONTROLLED TYPE 2 DIABETES MELLITUS WITH DIABETIC POLYNEUROPATHY, WITHOUT LONG-TERM CURRENT USE OF INSULIN: ICD-10-CM

## 2017-09-22 NOTE — TELEPHONE ENCOUNTER
----- Message from Ej Santos sent at 9/22/2017  1:04 PM CDT -----  Contact: Leoa/ Wife/ [phone] cell  Type: Rx    Name of medication(s): hydrocodone-acetaminophen 5-325mg (NORCO) 5-325 mg per tablet    Is this a refill? New rx? refill    Who prescribed medication? Dr. Root    Pharmacy Name, Phone, & Location: [facility]    Comments: Pt's wife is calling to request a refill on the medication above.  She would like a call back when the medication has been sent in.  Please call and advise.    Thank you

## 2017-09-23 RX ORDER — HYDROCODONE BITARTRATE AND ACETAMINOPHEN 5; 325 MG/1; MG/1
1 TABLET ORAL EVERY 6 HOURS PRN
Qty: 60 TABLET | Refills: 0 | Status: SHIPPED | OUTPATIENT
Start: 2017-09-23 | End: 2017-10-19 | Stop reason: SDUPTHER

## 2017-10-19 DIAGNOSIS — M47.816 OSTEOARTHRITIS OF LUMBAR SPINE, UNSPECIFIED SPINAL OSTEOARTHRITIS COMPLICATION STATUS: ICD-10-CM

## 2017-10-19 DIAGNOSIS — E11.42 CONTROLLED TYPE 2 DIABETES MELLITUS WITH DIABETIC POLYNEUROPATHY, WITHOUT LONG-TERM CURRENT USE OF INSULIN: ICD-10-CM

## 2017-10-19 RX ORDER — HYDROCODONE BITARTRATE AND ACETAMINOPHEN 5; 325 MG/1; MG/1
1 TABLET ORAL EVERY 6 HOURS PRN
Qty: 60 TABLET | Refills: 0 | Status: SHIPPED | OUTPATIENT
Start: 2017-10-19 | End: 2017-11-21 | Stop reason: SDUPTHER

## 2017-10-19 NOTE — TELEPHONE ENCOUNTER
----- Message from Yesenia Haider sent at 10/19/2017 10:52 AM CDT -----  Contact: Spouse/ Lida [phone]  Prescription Request:     Name of medication: hydrocodone-acetaminophen 5-325mg (NORCO) 5-325 mg per tablet    Reason for request: Refill    Ph[facility] [place] [facility] [address]    Please advise. Patient also needs his annual scheduled that was due in November but nothing shows available until January.    Thank You

## 2017-11-03 ENCOUNTER — TELEPHONE (OUTPATIENT)
Dept: INTERNAL MEDICINE | Facility: CLINIC | Age: 82
End: 2017-11-03

## 2017-11-03 NOTE — TELEPHONE ENCOUNTER
pls advise pt stated he would like to come in for a bp check no appts available until next year and pt only wants to see you

## 2017-11-03 NOTE — TELEPHONE ENCOUNTER
----- Message from Christa Catalan sent at 11/3/2017  9:04 AM CDT -----  Contact: [phone] patient's cell   Pt called to request an appt for next week because his bp is fluctuating and he would like to come in for a bp check. Your next available ep is not until January. Pt refuses to see anyone else. Please call to advise him whether or not you can fit him in.

## 2017-11-06 NOTE — TELEPHONE ENCOUNTER
Pt angle ferguson fluctuating offered appt will call back Wednesday to see if any improvement he is busy this week.painting the front porch.

## 2017-11-08 ENCOUNTER — IMMUNIZATION (OUTPATIENT)
Dept: INTERNAL MEDICINE | Facility: CLINIC | Age: 82
End: 2017-11-08
Payer: MEDICARE

## 2017-11-08 ENCOUNTER — OFFICE VISIT (OUTPATIENT)
Dept: INTERNAL MEDICINE | Facility: CLINIC | Age: 82
End: 2017-11-08
Payer: MEDICARE

## 2017-11-08 VITALS
SYSTOLIC BLOOD PRESSURE: 178 MMHG | DIASTOLIC BLOOD PRESSURE: 96 MMHG | HEIGHT: 67 IN | OXYGEN SATURATION: 98 % | HEART RATE: 60 BPM | WEIGHT: 157.63 LBS | BODY MASS INDEX: 24.74 KG/M2

## 2017-11-08 DIAGNOSIS — I95.2 HYPOTENSION DUE TO MEDICATION: ICD-10-CM

## 2017-11-08 DIAGNOSIS — I10 ESSENTIAL HYPERTENSION: Primary | ICD-10-CM

## 2017-11-08 PROCEDURE — G0008 ADMIN INFLUENZA VIRUS VAC: HCPCS | Mod: PBBFAC

## 2017-11-08 PROCEDURE — 99999 PR PBB SHADOW E&M-EST. PATIENT-LVL III: CPT | Mod: PBBFAC,,, | Performed by: INTERNAL MEDICINE

## 2017-11-08 PROCEDURE — 99213 OFFICE O/P EST LOW 20 MIN: CPT | Mod: S$PBB,,, | Performed by: INTERNAL MEDICINE

## 2017-11-08 PROCEDURE — 99213 OFFICE O/P EST LOW 20 MIN: CPT | Mod: PBBFAC | Performed by: INTERNAL MEDICINE

## 2017-11-08 NOTE — PROGRESS NOTES
Subjective:       Patient ID: Yogesh Shay is a 83 y.o. male.    Chief Complaint: Hypertension    Hypertension       Review of Systems    Objective:      Physical Exam    Assessment:       No diagnosis found.    Plan:   There are no diagnoses linked to this encounter.

## 2017-11-08 NOTE — PATIENT INSTRUCTIONS
Take 1 atenolol at night instead of 1.5  See if this changes your BP.  Let me or Dr Root know what results you get

## 2017-11-08 NOTE — PROGRESS NOTES
Subjective:       Patient ID: Yogesh Shay is a 83 y.o. male.    Chief Complaint: Hypertension    Patient with HTN having episodes in late evening of low BP.  Keeps diligent notes of BP at least 2-3 times a day.  In last 2-3 weeks, BP drops to 80/60 between 5 pm and 7 pm and gradually recovers  He feels weak when this happens  BS is generally well controlled      Review of Systems   Constitutional: Negative for activity change, appetite change and fever.   HENT: Negative for congestion, postnasal drip and sore throat.    Respiratory: Negative for cough, shortness of breath and wheezing.    Cardiovascular: Negative for chest pain and palpitations.   Gastrointestinal: Negative for abdominal pain, blood in stool, constipation, diarrhea, nausea and vomiting.   Genitourinary: Negative for decreased urine volume, difficulty urinating, flank pain and frequency.   Musculoskeletal: Negative for arthralgias.   Neurological: Negative for dizziness, weakness and headaches.       Objective:      Physical Exam   Constitutional: He is oriented to person, place, and time. He appears well-developed and well-nourished. No distress.   HENT:   Head: Normocephalic and atraumatic.   Right Ear: External ear normal.   Left Ear: External ear normal.   Eyes: Conjunctivae and EOM are normal. Pupils are equal, round, and reactive to light.   Neck: Normal range of motion. Neck supple. No thyromegaly present.   Cardiovascular: Normal rate and regular rhythm.    Pulmonary/Chest: Effort normal and breath sounds normal.   Abdominal: Soft. Bowel sounds are normal. He exhibits no mass. There is no tenderness. There is no rebound and no guarding.   Musculoskeletal: Normal range of motion.   Lymphadenopathy:     He has no cervical adenopathy.   Neurological: He is alert and oriented to person, place, and time. He has normal reflexes. He displays normal reflexes. No cranial nerve deficit. He exhibits normal muscle tone. Coordination normal.    Skin: Skin is warm and dry.       Assessment:       1. Essential hypertension    2. Hypotension due to medication        Plan:   Yogesh was seen today for hypertension.    Diagnoses and all orders for this visit:    Essential hypertension    Hypotension due to medication

## 2017-11-21 DIAGNOSIS — M47.816 OSTEOARTHRITIS OF LUMBAR SPINE, UNSPECIFIED SPINAL OSTEOARTHRITIS COMPLICATION STATUS: ICD-10-CM

## 2017-11-21 DIAGNOSIS — E11.42 CONTROLLED TYPE 2 DIABETES MELLITUS WITH DIABETIC POLYNEUROPATHY, WITHOUT LONG-TERM CURRENT USE OF INSULIN: ICD-10-CM

## 2017-11-21 NOTE — TELEPHONE ENCOUNTER
----- Message from Haydee Cisneros sent at 11/21/2017 12:27 PM CST -----  Contact: maryanne Hilton [phone]  Type: Rx    Name of medication(s): hydrocodone-acetaminophen 5-325mg (NORCO) 5-325 mg per tablet    Is this a refill? New rx? refill    Who prescribed medication? [facility]    Pharmacy Name, Phone, & Location: [facility] [phone] (Phone    Comments: Patient would like to talk to someone in the clinic please.    Thank you

## 2017-11-25 RX ORDER — HYDROCODONE BITARTRATE AND ACETAMINOPHEN 5; 325 MG/1; MG/1
1 TABLET ORAL EVERY 6 HOURS PRN
Qty: 60 TABLET | Refills: 0 | Status: SHIPPED | OUTPATIENT
Start: 2017-11-25 | End: 2017-12-21 | Stop reason: SDUPTHER

## 2017-11-25 NOTE — TELEPHONE ENCOUNTER
Reviewed chart - patient has been on this medication chronically, with approximately monthly refills. Last fill by [facility] 10/19/17. Last seen by PCP 5/2017; has appt scheduled 1/2018.  Attempted to call patient, unable to reach or leave vm.  Will defer refill to PCP.

## 2017-12-21 DIAGNOSIS — E11.42 CONTROLLED TYPE 2 DIABETES MELLITUS WITH DIABETIC POLYNEUROPATHY, WITHOUT LONG-TERM CURRENT USE OF INSULIN: ICD-10-CM

## 2017-12-21 DIAGNOSIS — M47.816 OSTEOARTHRITIS OF LUMBAR SPINE, UNSPECIFIED SPINAL OSTEOARTHRITIS COMPLICATION STATUS: ICD-10-CM

## 2017-12-21 RX ORDER — HYDROCODONE BITARTRATE AND ACETAMINOPHEN 5; 325 MG/1; MG/1
1 TABLET ORAL EVERY 6 HOURS PRN
Qty: 60 TABLET | Refills: 0 | Status: SHIPPED | OUTPATIENT
Start: 2017-12-21 | End: 2018-01-22 | Stop reason: SDUPTHER

## 2017-12-21 NOTE — TELEPHONE ENCOUNTER
----- Message from Daphnie Rebecca sent at 12/21/2017  8:20 AM CST -----  Contact: pt wife [phone]  RX request - refill or new RX.  Is this a refill or new RX:  refill  RX name and strength: hydrocodone-acetaminophen 5-325mg (NORCO) 5-325 mg per tablet  Directions:   Is this a 30 day or 90 day RX:    Pharmacy name and phone # ([facility] - [place] @[phone]

## 2018-01-01 NOTE — TELEPHONE ENCOUNTER
----- Message from Kimberley Samson sent at 1/20/2017  9:32 AM CST -----  Contact: Wife/Lida/[phone]  RX request - refill or new RX.  Is this a refill or new RX:  Refill   RX name and strength: hydrocodone-acetaminophen 5-325mg (NORCO) 5-325 mg per tablet  Directions:  Take 1 tablet by mouth every 6 (six) hours as needed for Pain  Is this a 30 day or 90 day RX:    [place]arm[place] and [place] [facility] [place]  [phone]   Comments:  Pt's wife said that she would like a call when the rx has been sent to the pharmacy she stated that she would like the refill to be taken care of today if possible. Please call and advise      Thank you         
Message left rx sent to pharmacy  
EOAE (evoked otoacoustic emission)

## 2018-01-08 ENCOUNTER — OFFICE VISIT (OUTPATIENT)
Dept: DERMATOLOGY | Facility: CLINIC | Age: 83
End: 2018-01-08
Payer: MEDICARE

## 2018-01-08 DIAGNOSIS — L91.8 SKIN TAG: ICD-10-CM

## 2018-01-08 DIAGNOSIS — D22.9 NEVUS: ICD-10-CM

## 2018-01-08 DIAGNOSIS — L82.1 SK (SEBORRHEIC KERATOSIS): ICD-10-CM

## 2018-01-08 DIAGNOSIS — L81.4 LENTIGO: ICD-10-CM

## 2018-01-08 DIAGNOSIS — L57.0 AK (ACTINIC KERATOSIS): Primary | ICD-10-CM

## 2018-01-08 PROCEDURE — 99999 PR PBB SHADOW E&M-EST. PATIENT-LVL II: CPT | Mod: PBBFAC,,, | Performed by: DERMATOLOGY

## 2018-01-08 PROCEDURE — 99212 OFFICE O/P EST SF 10 MIN: CPT | Mod: PBBFAC,PO | Performed by: DERMATOLOGY

## 2018-01-08 PROCEDURE — 17004 DESTROY PREMAL LESIONS 15/>: CPT | Mod: PBBFAC,PO | Performed by: DERMATOLOGY

## 2018-01-08 PROCEDURE — 99213 OFFICE O/P EST LOW 20 MIN: CPT | Mod: 25,S$PBB,, | Performed by: DERMATOLOGY

## 2018-01-08 PROCEDURE — 17004 DESTROY PREMAL LESIONS 15/>: CPT | Mod: S$PBB,,, | Performed by: DERMATOLOGY

## 2018-01-08 NOTE — PROGRESS NOTES
Subjective:       Patient ID:  Yogesh Shay is a 83 y.o. male who presents for   Chief Complaint   Patient presents with    Lesion     Pt here today for a UBSE. Pt c/o scaly lesions on face, hands, neck, chest and left ear x a few months. No bleeding or pain.       Lesion         Review of Systems   Skin: Positive for activity-related sunscreen use and wears hat. Negative for daily sunscreen use and recent sunburn.   Hematologic/Lymphatic: Bruises/bleeds easily.        Objective:    Physical Exam   Constitutional: He appears well-developed and well-nourished. No distress.   Neurological: He is alert and oriented to person, place, and time. He is not disoriented.   Psychiatric: He has a normal mood and affect.   Skin:   Areas Examined (abnormalities noted in diagram):   Scalp / Hair Palpated and Inspected  Head / Face Inspection Performed  Neck Inspection Performed  Chest / Axilla Inspection Performed  Abdomen Inspection Performed  Back Inspection Performed  RUE Inspected  LUE Inspection Performed  Nails and Digits Inspection Performed                       Diagram Legend     Erythematous scaling macule/papule c/w actinic keratosis       Vascular papule c/w angioma      Pigmented verrucoid papule/plaque c/w seborrheic keratosis      Yellow umbilicated papule c/w sebaceous hyperplasia      Irregularly shaped tan macule c/w lentigo     1-2 mm smooth white papules consistent with Milia      Movable subcutaneous cyst with punctum c/w epidermal inclusion cyst      Subcutaneous movable cyst c/w pilar cyst      Firm pink to brown papule c/w dermatofibroma      Pedunculated fleshy papule(s) c/w skin tag(s)      Evenly pigmented macule c/w junctional nevus     Mildly variegated pigmented, slightly irregular-bordered macule c/w mildly atypical nevus      Flesh colored to evenly pigmented papule c/w intradermal nevus       Pink pearly papule/plaque c/w basal cell carcinoma      Erythematous hyperkeratotic cursted  plaque c/w SCC      Surgical scar with no sign of skin cancer recurrence      Open and closed comedones      Inflammatory papules and pustules      Verrucoid papule consistent consistent with wart     Erythematous eczematous patches and plaques     Dystrophic onycholytic nail with subungual debris c/w onychomycosis     Umbilicated papule    Erythematous-base heme-crusted tan verrucoid plaque consistent with inflamed seborrheic keratosis     Erythematous Silvery Scaling Plaque c/w Psoriasis     See annotation      Assessment / Plan:        AK (actinic keratosis)  Cryosurgery Procedure Note    Verbal consent from the patient is obtained and the patient is aware of the precancerous quality and need for treatment of these lesions. Liquid nitrogen cryosurgery is applied to the 21 actinic keratoses, as detailed in the physical exam, to produce a freeze injury. The patient is aware that blisters may form and is instructed on wound care with gentle cleansing and use of vaseline ointment to keep moist until healed. The patient is supplied a handout on cryosurgery and is instructed to call if lesions do not completely resolve.    Efudex  Bid x 2-3 weeks on hands    Lentigo  This is a benign hyperpigmented sun induced lesion. Daily sun protection will reduce the number of new lesions. Treatment of these benign lesions are considered cosmetic.  The nature of sun-induced photo-aging and skin cancers is discussed.  Sun avoidance, protective clothing, and the use of 30-SPF sunscreens is advised. Observe closely for skin damage/changes, and call if such occurs.    Nevus  Discussed ABCDE's of nevi.  Monitor for new mole or moles that are becoming bigger, darker, irritated, or developing irregular borders. Brochure provided.    SK (seborrheic keratosis)  These are benign inherited growths without a malignant potential. Reassurance given to patient. No treatment is necessary.     Skin tag  Reassurance given to patient. No treatment is  necessary.   Treatment of benign, asymptomatic lesions may be considered cosmetic.      Personal history of skin cancer  Scar  Area(s) of previous NMSC evaluated with no signs of recurrence.    Upper body skin examination performed today including at least 6 points as noted in physical examination. No lesions suspicious for malignancy noted.             Return in about 6 months (around 7/8/2018).

## 2018-01-22 ENCOUNTER — TELEPHONE (OUTPATIENT)
Dept: INTERNAL MEDICINE | Facility: CLINIC | Age: 83
End: 2018-01-22

## 2018-01-22 DIAGNOSIS — M47.816 OSTEOARTHRITIS OF LUMBAR SPINE, UNSPECIFIED SPINAL OSTEOARTHRITIS COMPLICATION STATUS: ICD-10-CM

## 2018-01-22 DIAGNOSIS — E11.42 CONTROLLED TYPE 2 DIABETES MELLITUS WITH DIABETIC POLYNEUROPATHY, WITHOUT LONG-TERM CURRENT USE OF INSULIN: ICD-10-CM

## 2018-01-22 RX ORDER — HYDROCODONE BITARTRATE AND ACETAMINOPHEN 5; 325 MG/1; MG/1
1 TABLET ORAL EVERY 6 HOURS PRN
Qty: 60 TABLET | Refills: 0 | Status: SHIPPED | OUTPATIENT
Start: 2018-01-22 | End: 2018-02-22 | Stop reason: SDUPTHER

## 2018-01-22 NOTE — TELEPHONE ENCOUNTER
----- Message from Jelani Antony sent at 1/22/2018  9:46 AM CST -----  Contact: self [phone]  Type: Rx     Name of medication(s):  hydrocodone-acetaminophen 5-325mg (NORCO) 5-325 mg per tablet    Is this a refill? New rx? refill      Who prescribed medication?    Pharmacy Name, Phone, & Location: [facility] [phone]     Comments: please advise, Thanks

## 2018-01-25 ENCOUNTER — TELEPHONE (OUTPATIENT)
Dept: INTERNAL MEDICINE | Facility: CLINIC | Age: 83
End: 2018-01-25

## 2018-01-25 NOTE — TELEPHONE ENCOUNTER
----- Message from Katty Stark sent at 1/25/2018  9:35 AM CST -----  Contact: wife/yo/ [phone]  Patient has an appointment on 1/31/18 and would like to know if he is to have blood work drawn, please call to advise.

## 2018-01-26 ENCOUNTER — LAB VISIT (OUTPATIENT)
Dept: LAB | Facility: HOSPITAL | Age: 83
End: 2018-01-26
Attending: INTERNAL MEDICINE
Payer: MEDICARE

## 2018-01-26 DIAGNOSIS — E11.42 CONTROLLED TYPE 2 DIABETES MELLITUS WITH DIABETIC POLYNEUROPATHY, WITHOUT LONG-TERM CURRENT USE OF INSULIN: ICD-10-CM

## 2018-01-26 LAB
ALBUMIN SERPL BCP-MCNC: 4.3 G/DL
ALP SERPL-CCNC: 55 U/L
ALT SERPL W/O P-5'-P-CCNC: 10 U/L
ANION GAP SERPL CALC-SCNC: 14 MMOL/L
AST SERPL-CCNC: 18 U/L
BASOPHILS # BLD AUTO: 0.04 K/UL
BASOPHILS NFR BLD: 0.7 %
BILIRUB SERPL-MCNC: 1 MG/DL
BUN SERPL-MCNC: 15 MG/DL
CALCIUM SERPL-MCNC: 9.8 MG/DL
CHLORIDE SERPL-SCNC: 94 MMOL/L
CHOLEST SERPL-MCNC: 166 MG/DL
CHOLEST/HDLC SERPL: 3 {RATIO}
CO2 SERPL-SCNC: 23 MMOL/L
CREAT SERPL-MCNC: 0.8 MG/DL
DIFFERENTIAL METHOD: ABNORMAL
EOSINOPHIL # BLD AUTO: 0.2 K/UL
EOSINOPHIL NFR BLD: 2.9 %
ERYTHROCYTE [DISTWIDTH] IN BLOOD BY AUTOMATED COUNT: 13 %
EST. GFR  (AFRICAN AMERICAN): >60 ML/MIN/1.73 M^2
EST. GFR  (NON AFRICAN AMERICAN): >60 ML/MIN/1.73 M^2
ESTIMATED AVG GLUCOSE: 114 MG/DL
GLUCOSE SERPL-MCNC: 116 MG/DL
HBA1C MFR BLD HPLC: 5.6 %
HCT VFR BLD AUTO: 38.5 %
HDLC SERPL-MCNC: 55 MG/DL
HDLC SERPL: 33.1 %
HGB BLD-MCNC: 13.3 G/DL
IMM GRANULOCYTES # BLD AUTO: 0.01 K/UL
IMM GRANULOCYTES NFR BLD AUTO: 0.2 %
LDLC SERPL CALC-MCNC: 84 MG/DL
LYMPHOCYTES # BLD AUTO: 2.1 K/UL
LYMPHOCYTES NFR BLD: 35.1 %
MCH RBC QN AUTO: 32.7 PG
MCHC RBC AUTO-ENTMCNC: 34.5 G/DL
MCV RBC AUTO: 95 FL
MONOCYTES # BLD AUTO: 0.5 K/UL
MONOCYTES NFR BLD: 7.8 %
NEUTROPHILS # BLD AUTO: 3.1 K/UL
NEUTROPHILS NFR BLD: 53.3 %
NONHDLC SERPL-MCNC: 111 MG/DL
NRBC BLD-RTO: 0 /100 WBC
PLATELET # BLD AUTO: 169 K/UL
PMV BLD AUTO: 9.8 FL
POTASSIUM SERPL-SCNC: 4 MMOL/L
PROT SERPL-MCNC: 7.8 G/DL
RBC # BLD AUTO: 4.07 M/UL
SODIUM SERPL-SCNC: 131 MMOL/L
TRIGL SERPL-MCNC: 135 MG/DL
WBC # BLD AUTO: 5.87 K/UL

## 2018-01-26 PROCEDURE — 36415 COLL VENOUS BLD VENIPUNCTURE: CPT | Mod: PO

## 2018-01-26 PROCEDURE — 80053 COMPREHEN METABOLIC PANEL: CPT

## 2018-01-26 PROCEDURE — 83036 HEMOGLOBIN GLYCOSYLATED A1C: CPT

## 2018-01-26 PROCEDURE — 80061 LIPID PANEL: CPT

## 2018-01-26 PROCEDURE — 85025 COMPLETE CBC W/AUTO DIFF WBC: CPT

## 2018-01-31 ENCOUNTER — OFFICE VISIT (OUTPATIENT)
Dept: INTERNAL MEDICINE | Facility: CLINIC | Age: 83
End: 2018-01-31
Payer: MEDICARE

## 2018-01-31 ENCOUNTER — HOSPITAL ENCOUNTER (OUTPATIENT)
Dept: RADIOLOGY | Facility: HOSPITAL | Age: 83
Discharge: HOME | End: 2018-01-31
Attending: INTERNAL MEDICINE
Payer: MEDICARE

## 2018-01-31 VITALS
WEIGHT: 155.44 LBS | SYSTOLIC BLOOD PRESSURE: 120 MMHG | BODY MASS INDEX: 24.4 KG/M2 | HEART RATE: 64 BPM | TEMPERATURE: 99 F | DIASTOLIC BLOOD PRESSURE: 78 MMHG | HEIGHT: 67 IN

## 2018-01-31 DIAGNOSIS — K40.90 HERNIA, INGUINAL, LEFT: ICD-10-CM

## 2018-01-31 DIAGNOSIS — E11.42 CONTROLLED TYPE 2 DIABETES MELLITUS WITH DIABETIC POLYNEUROPATHY, WITHOUT LONG-TERM CURRENT USE OF INSULIN: Primary | ICD-10-CM

## 2018-01-31 DIAGNOSIS — K40.90 LEFT INGUINAL HERNIA: ICD-10-CM

## 2018-01-31 DIAGNOSIS — E87.1 HYPONATREMIA: ICD-10-CM

## 2018-01-31 DIAGNOSIS — L21.9 ACUTE SEBORRHEIC DERMATITIS: ICD-10-CM

## 2018-01-31 DIAGNOSIS — I10 ESSENTIAL HYPERTENSION: ICD-10-CM

## 2018-01-31 DIAGNOSIS — E46 MALNUTRITION, UNSPECIFIED TYPE: ICD-10-CM

## 2018-01-31 PROCEDURE — 99214 OFFICE O/P EST MOD 30 MIN: CPT | Mod: S$PBB,,, | Performed by: INTERNAL MEDICINE

## 2018-01-31 PROCEDURE — 99213 OFFICE O/P EST LOW 20 MIN: CPT | Mod: PBBFAC,25 | Performed by: INTERNAL MEDICINE

## 2018-01-31 PROCEDURE — 1125F AMNT PAIN NOTED PAIN PRSNT: CPT | Mod: ,,, | Performed by: INTERNAL MEDICINE

## 2018-01-31 PROCEDURE — 99999 PR PBB SHADOW E&M-EST. PATIENT-LVL III: CPT | Mod: PBBFAC,,, | Performed by: INTERNAL MEDICINE

## 2018-01-31 PROCEDURE — 76705 ECHO EXAM OF ABDOMEN: CPT | Mod: 26,GC,, | Performed by: RADIOLOGY

## 2018-01-31 PROCEDURE — 1159F MED LIST DOCD IN RCRD: CPT | Mod: ,,, | Performed by: INTERNAL MEDICINE

## 2018-01-31 PROCEDURE — 76705 ECHO EXAM OF ABDOMEN: CPT | Mod: TC

## 2018-01-31 RX ORDER — HYDROCHLOROTHIAZIDE 12.5 MG/1
12.5 CAPSULE ORAL DAILY
Qty: 90 CAPSULE | Refills: 3 | Status: SHIPPED | OUTPATIENT
Start: 2018-01-31 | End: 2018-03-02

## 2018-01-31 NOTE — PROGRESS NOTES
Subjective:       Patient ID: Yogesh Shay is a 83 y.o. male.    Chief Complaint: Diabetes; Abdominal Pain (L side lump); and Rash (chin)    Diabetes   He presents for his follow-up diabetic visit. He has type 2 diabetes mellitus. His disease course has been improving. There are no hypoglycemic associated symptoms. There are no diabetic associated symptoms. Symptoms are stable. Diabetic complications include peripheral neuropathy.   Abdominal Pain   This is a new problem. The current episode started 1 to 4 weeks ago. The onset quality is sudden. The pain is located in the LLQ. The pain is mild. The quality of the pain is aching. The abdominal pain does not radiate. Pertinent negatives include no constipation.   Rash   This is a new problem. The current episode started in the past 7 days. The affected locations include the face.     Review of Systems   Gastrointestinal: Positive for abdominal pain. Negative for constipation.   Skin: Positive for rash.       Objective:      Physical Exam   Constitutional: He is oriented to person, place, and time. He appears well-developed and well-nourished. No distress.   HENT:   Head: Normocephalic and atraumatic.   Mouth/Throat: Oropharynx is clear and moist.   Eyes: Conjunctivae are normal. Pupils are equal, round, and reactive to light.   Neck: Normal range of motion. Neck supple.   Cardiovascular: Normal rate, regular rhythm and normal heart sounds.    Pulmonary/Chest: Effort normal and breath sounds normal. He has no wheezes.   Abdominal: Soft. Bowel sounds are normal. There is no tenderness.       Musculoskeletal: Normal range of motion. He exhibits no edema or tenderness.   Neurological: He is alert and oriented to person, place, and time. No cranial nerve deficit.   Skin: No erythema.        Psychiatric: He has a normal mood and affect.   Vitals reviewed.      Assessment:       1. Left inguinal hernia    2. Hernia, inguinal, left    3. Essential hypertension    4.  Controlled type 2 diabetes mellitus with diabetic polyneuropathy, without long-term current use of insulin    5. Malnutrition, unspecified type        Plan:       Yogesh was seen today for diabetes, abdominal pain and rash.    Diagnoses and all orders for this visit:    Left inguinal hernia  Comments:  difficult to reduce-  may need eval soon  Orders:  -     Cancel: US Soft Tissue Misc; Future    Hernia, inguinal, left  -     Ambulatory consult to General Surgery    Essential hypertension    Controlled type 2 diabetes mellitus with diabetic polyneuropathy, without long-term current use of insulin    Malnutrition, unspecified type  Comments:  weight down, attributed to poor dentition- discussed Ensure, protein shakes, etc    Other orders  -     hydroCHLOROthiazide (MICROZIDE) 12.5 mg capsule; Take 1 capsule (12.5 mg total) by mouth once daily.        Follow-up in about 6 months (around 7/31/2018) for F/U APPOINTMENT WITH ME, WITH LAB BEFORE.

## 2018-02-01 ENCOUNTER — TELEPHONE (OUTPATIENT)
Dept: INTERNAL MEDICINE | Facility: CLINIC | Age: 83
End: 2018-02-01

## 2018-02-01 NOTE — TELEPHONE ENCOUNTER
----- Message from Jelani Antony sent at 2/1/2018  9:34 AM CST -----  Contact: Lida [phone]  Patient's wife is returning a call from the office . Please advise, Thanks !

## 2018-02-05 ENCOUNTER — TELEPHONE (OUTPATIENT)
Dept: INTERNAL MEDICINE | Facility: CLINIC | Age: 83
End: 2018-02-05

## 2018-02-05 NOTE — TELEPHONE ENCOUNTER
Left message to say lab scheduled on the same day he willsee Dr Zhong at [facility] non ft. appt mailed

## 2018-02-05 NOTE — TELEPHONE ENCOUNTER
----- Message from Aubrey Root MD sent at 2/1/2018  5:12 PM CST -----  pls arrange BMP in 1 week or so   thanks

## 2018-02-06 ENCOUNTER — OFFICE VISIT (OUTPATIENT)
Dept: OPTOMETRY | Facility: CLINIC | Age: 83
End: 2018-02-06
Payer: MEDICARE

## 2018-02-06 DIAGNOSIS — H40.039 ANATOMICAL NARROW ANGLE: Primary | ICD-10-CM

## 2018-02-06 DIAGNOSIS — H40.003 GLAUCOMA SUSPECT OF BOTH EYES: ICD-10-CM

## 2018-02-06 DIAGNOSIS — E11.9 TYPE 2 DIABETES MELLITUS WITHOUT RETINOPATHY: ICD-10-CM

## 2018-02-06 DIAGNOSIS — H25.13 NUCLEAR SCLEROSIS OF BOTH EYES: ICD-10-CM

## 2018-02-06 PROCEDURE — 99999 PR PBB SHADOW E&M-EST. PATIENT-LVL II: CPT | Mod: PBBFAC,,, | Performed by: OPTOMETRIST

## 2018-02-06 PROCEDURE — 92012 INTRM OPH EXAM EST PATIENT: CPT | Mod: S$PBB,,, | Performed by: OPTOMETRIST

## 2018-02-06 PROCEDURE — 99212 OFFICE O/P EST SF 10 MIN: CPT | Mod: PBBFAC,PO | Performed by: OPTOMETRIST

## 2018-02-06 NOTE — PROGRESS NOTES
Subjective:       Patient ID: Yogesh Shay is a 83 y.o. male      Chief Complaint   Patient presents with    Diabetic Eye Exam     History of Present Illness  Last Eye Exam: [facility]; ~2016    Pt here for diabetic eye exam. Pt c/o of excessive watery eye (od); Pt c/o of near and distant vision. Pt has bifocals, but doesn't wear them.    (--) Eye pain/discomfort  (+) Itching; off and on  (+) Watery Eyes; ou; off and on; mostly od  (--) Dryness  (--) Floaters/Black Spots  (+) Headaches; pt c/o of sinus headaches  (--) Photophobia    Eye Meds: none  Glasses: pt has bifocals but doesn't wear them  Contacts: none    Hemoglobin A1C       Date                     Value               Ref Range           Status                01/26/2018               5.6                 4.0 - 5.6 %         Final                 05/16/2017               6.0                 4.5 - 6.2 %         Final               05/16/2017               6.0                 4.5 - 6.2 %         Final      ----------       Assessment/Plan:     1. Anatomical narrow angle  2. 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 Breaux but did not follow up. Elevated IOP OS today compared to previous. ? glaucoma, difficult to assess nerve due to small pupils and no dilation with narrow angles. Pt to follow up with Dr. Romero for evaluation.    - Ambulatory referral to Ophthalmology    3. Nuclear sclerosis of both eyes  OD > OS. Likely visually significant OD. Referral to Dr. Romero for evaluation.   - Ambulatory referral to Ophthalmology    4. Type 2 diabetes mellitus without retinopathy  Unable to assess fundus. DFE after angle evaluation.     Follow-up for consult with Dr. Romero.

## 2018-02-15 ENCOUNTER — HOSPITAL ENCOUNTER (OUTPATIENT)
Dept: RADIOLOGY | Facility: HOSPITAL | Age: 83
Discharge: HOME | End: 2018-02-15
Attending: SURGERY
Payer: MEDICARE

## 2018-02-15 ENCOUNTER — OFFICE VISIT (OUTPATIENT)
Dept: SURGERY | Facility: CLINIC | Age: 83
End: 2018-02-15
Payer: MEDICARE

## 2018-02-15 ENCOUNTER — HOSPITAL ENCOUNTER (OUTPATIENT)
Dept: CARDIOLOGY | Facility: CLINIC | Age: 83
Discharge: HOME | End: 2018-02-15
Payer: MEDICARE

## 2018-02-15 VITALS
TEMPERATURE: 98 F | BODY MASS INDEX: 24.17 KG/M2 | HEART RATE: 94 BPM | HEIGHT: 67 IN | WEIGHT: 154 LBS | SYSTOLIC BLOOD PRESSURE: 194 MMHG | DIASTOLIC BLOOD PRESSURE: 102 MMHG

## 2018-02-15 DIAGNOSIS — R63.4 UNINTENTIONAL WEIGHT LOSS: ICD-10-CM

## 2018-02-15 DIAGNOSIS — K40.90 LEFT INGUINAL HERNIA: ICD-10-CM

## 2018-02-15 PROCEDURE — 1159F MED LIST DOCD IN RCRD: CPT | Mod: ,,, | Performed by: SURGERY

## 2018-02-15 PROCEDURE — 99999 PR PBB SHADOW E&M-EST. PATIENT-LVL III: CPT | Mod: PBBFAC,,, | Performed by: SURGERY

## 2018-02-15 PROCEDURE — 71046 X-RAY EXAM CHEST 2 VIEWS: CPT | Mod: TC,FY

## 2018-02-15 PROCEDURE — 99203 OFFICE O/P NEW LOW 30 MIN: CPT | Mod: S$PBB,,, | Performed by: SURGERY

## 2018-02-15 PROCEDURE — 93010 ELECTROCARDIOGRAM REPORT: CPT | Mod: S$PBB,,, | Performed by: INTERNAL MEDICINE

## 2018-02-15 PROCEDURE — 99213 OFFICE O/P EST LOW 20 MIN: CPT | Mod: PBBFAC | Performed by: SURGERY

## 2018-02-15 PROCEDURE — 93005 ELECTROCARDIOGRAM TRACING: CPT | Mod: PBBFAC | Performed by: INTERNAL MEDICINE

## 2018-02-15 PROCEDURE — 71046 X-RAY EXAM CHEST 2 VIEWS: CPT | Mod: 26,,, | Performed by: RADIOLOGY

## 2018-02-15 NOTE — PROGRESS NOTES
History & Physical    SUBJECTIVE:     History of Present Illness:  Patient is a 83 y.o. male presents with left inguinal hernia.  He is s/p open bih with mesh on the right side (complicated by mesh removal) and without mesh on the left side around 1966 and redo lih 1976.  Over the last 6 months he noticed a bulge in the left groin associated with throbbing pain.  The pain is intermittent and random.  There are no aggravating or relieving factors.  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      Chief Complaint   Patient presents with    Hernia     LIH       Review of patient's allergies indicates:   Allergen Reactions    Sulfa (sulfonamide antibiotics) Swelling       Current Outpatient Prescriptions   Medication Sig Dispense Refill    aspirin 81 mg Tab Take by mouth. 1 Tablet Oral Every day      atenolol (TENORMIN) 50 MG tablet TAKE ONE AND ONE-HALF TABLETS TWICE A  tablet 2    diclofenac (CATAFLAM) 50 MG tablet TAKE 1 TABLET THREE TIMES A  tablet 3    gabapentin (NEURONTIN) 300 MG capsule TAKE 1 CAPSULE EVERY EVENING AT BEDTIME 90 capsule 3    hydroCHLOROthiazide (MICROZIDE) 12.5 mg capsule Take 1 capsule (12.5 mg total) by mouth once daily. 90 capsule 3    hydrocodone-acetaminophen 5-325mg (NORCO) 5-325 mg per tablet Take 1 tablet by mouth every 6 (six) hours as needed for Pain. 60 tablet 0    lisinopril 10 MG tablet Take 1 tablet (10 mg total) by mouth once daily. 90 tablet 3    metformin (GLUCOPHAGE) 500 MG tablet TAKE 1 TABLET TWICE A DAY WITH MEALS 180 tablet 3    omeprazole (PRILOSEC) 20 MG capsule Take 1 capsule (20 mg total) by mouth once daily. 90 capsule 3    pravastatin (PRAVACHOL) 10 MG tablet TAKE 1 TABLET DAILY 90 tablet 3    tamsulosin (FLOMAX) 0.4 mg Cp24 TAKE 1 CAPSULE DAILY 90 capsule 2     No current facility-administered medications for this visit.        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    Heart disease Father      ?    Cancer Sister      ?    No Known Problems Brother     No Known Problems Maternal Aunt     No Known Problems Maternal Uncle     No Known Problems Paternal Aunt     No Known Problems Paternal Uncle     No Known Problems Maternal Grandmother     No Known Problems Maternal Grandfather     No Known Problems Paternal Grandmother     No Known Problems Paternal Grandfather     Melanoma Neg Hx     Amblyopia Neg Hx     Blindness Neg Hx     Cataracts Neg Hx     Diabetes Neg Hx     Glaucoma Neg Hx     Hypertension Neg Hx     Macular degeneration Neg Hx     Retinal detachment Neg Hx     Strabismus Neg Hx     Stroke Neg Hx     Thyroid disease Neg Hx      Social History   Substance Use Topics    Smoking status: Former Smoker     Types: Cigarettes     Quit date: 9/25/1975    Smokeless tobacco: Former User    Alcohol use No        Review of Systems:  Review of Systems   Constitutional: Positive for unexpected weight change. Negative for fever.        Lost 40 pounds after his teeth were removed and with loss of appetite   Respiratory: Negative for cough, chest tightness and shortness of breath.    Cardiovascular: Negative for chest pain.   Gastrointestinal: Positive for abdominal pain, diarrhea and nausea. Negative for constipation and vomiting.        " Has epigastric pain due to gerd   Genitourinary: Negative for difficulty urinating and dysuria.   Skin: Negative for rash and wound.   Neurological: Negative for seizures and headaches.   Hematological: Does not bruise/bleed easily.       OBJECTIVE:     Vital Signs (Most Recent)  Temp: 98.3 °F (36.8 °C) (02/15/18 0949)  Pulse: 94 (02/15/18 0949)  BP: (!) 194/102 (02/15/18 0949)  5' 7" (1.702 m)  69.9 kg (154 lb)     Physical Exam:  Physical Exam   Constitutional: He is oriented to person, place, and time. He appears well-developed and well-nourished.   Neck: Normal range of motion. Neck supple.   Cardiovascular: Normal rate, regular rhythm and normal heart sounds.    Pulmonary/Chest: Effort normal and breath sounds normal.   Abdominal: Soft. Bowel sounds are normal. There is no tenderness.   Genitourinary:         Neurological: He is alert and oriented to person, place, and time.   Skin: Skin is warm and dry.   Psychiatric: He has a normal mood and affect. His behavior is normal. Judgment and thought content normal.   Vitals reviewed.      Laboratory  CBC: Reviewed  CMP: Reviewed    Diagnostic Results:  ECG: Reviewed  US: Reviewed    ASSESSMENT/PLAN:     Recurrent lih.  Losing weight with nausea (also has had teeth removed).    PLAN:    Open repair lih with mesh.  Anesthesia clearance.  23 hour stay.  Will get CT.              "

## 2018-02-15 NOTE — Clinical Note
February 15, 2018      Aubrey Root MD  [address]  [place] [address]           [facility] - General Surgery  [address]  [place] [address]  Phone: [phone]          Patient: Yogesh Shay   MR Number: [id number]   YOB: 1934   Date of Visit: 2/15/2018       Dear Dr. Aubrey Root:    Thank you for referring Yogesh Shay to me for evaluation. Attached you will find relevant portions of my assessment and plan of care.    If you have questions, please do not hesitate to call me. I look forward to following Yogesh Shay along with you.    Sincerely,    Jose Zhong MD    Enclosure  CC:  No Recipients    If you would like to receive this communication electronically, please contact [email or web address] or [phone] to request more information on [employer] Link access.    For providers and/or their staff who would like to refer a patient to [facility], please contact us through our one-stop-shop provider referral line, [facility], at [phone].    If you feel you have received this communication in error or would no longer like to receive these types of communications, please e-mail [email or web address]

## 2018-02-15 NOTE — LETTER
[facility] - General Surgery  [address]  [place] [address]  Phone: [phone] February 15, 2018      Aubrey Root MD  [address]  [place] [address]    Patient: Yogesh Shay   MR Number: [id number]   YOB: 1934   Date of Visit: 2/15/2018     Dear Dr. Root:    Thank you for referring Yogesh Shay to me for evaluation. Below are the relevant portions of my assessment and plan of care.    ASSESSMENT/PLAN:      Recurrent LIH.  Losing weight with nausea (also has had teeth removed).     PLAN:   Open repair LIH with mesh.    Anesthesia clearance.  23 hour stay.    Will get CT     If you have questions, please do not hesitate to call me. I look forward to following Yogesh along with you.    Sincerely,      Jose Zhong MD   Section Head - General, Laparoscopic, Bariatric  Acute Care and Oncologic Surgery   - Surgical Weight Loss Program  [facility]    [email or web address]

## 2018-02-22 DIAGNOSIS — M47.816 OSTEOARTHRITIS OF LUMBAR SPINE, UNSPECIFIED SPINAL OSTEOARTHRITIS COMPLICATION STATUS: ICD-10-CM

## 2018-02-22 DIAGNOSIS — E11.42 CONTROLLED TYPE 2 DIABETES MELLITUS WITH DIABETIC POLYNEUROPATHY, WITHOUT LONG-TERM CURRENT USE OF INSULIN: ICD-10-CM

## 2018-02-22 NOTE — TELEPHONE ENCOUNTER
"----- Message from Mansi Damon sent at 2/22/2018  1:09 PM CST -----  Contact: patient- [phone]  RX request - refill or new RX.  Is this a refill or new RX:  New rx  RX name and strength: hydrocodone-acetaminophen 5-325mg (NORCO)  Directions:   Is this a 30 day or 90 day RX:    Pharmacy name and phone # (DON'T enter "on file" or "in chart"): [facility] [phone] (Phone)  [phone] (Fax)  Comments:          "

## 2018-02-23 RX ORDER — HYDROCODONE BITARTRATE AND ACETAMINOPHEN 5; 325 MG/1; MG/1
1 TABLET ORAL EVERY 6 HOURS PRN
Qty: 60 TABLET | Refills: 0 | Status: ON HOLD | OUTPATIENT
Start: 2018-02-23 | End: 2018-03-14 | Stop reason: HOSPADM

## 2018-03-01 ENCOUNTER — TELEPHONE (OUTPATIENT)
Dept: INTERNAL MEDICINE | Facility: CLINIC | Age: 83
End: 2018-03-01

## 2018-03-01 DIAGNOSIS — R89.9 ABNORMAL LABORATORY TEST: Primary | ICD-10-CM

## 2018-03-05 ENCOUNTER — INITIAL CONSULT (OUTPATIENT)
Dept: OPHTHALMOLOGY | Facility: CLINIC | Age: 83
End: 2018-03-05
Payer: MEDICARE

## 2018-03-05 ENCOUNTER — LAB VISIT (OUTPATIENT)
Dept: LAB | Facility: HOSPITAL | Age: 83
End: 2018-03-05
Attending: INTERNAL MEDICINE
Payer: MEDICARE

## 2018-03-05 DIAGNOSIS — H25.13 NUCLEAR SENILE CATARACT OF BOTH EYES: ICD-10-CM

## 2018-03-05 DIAGNOSIS — H40.033 ANATOMICAL NARROW ANGLE BORDERLINE GLAUCOMA OF BOTH EYES: Primary | ICD-10-CM

## 2018-03-05 DIAGNOSIS — D23.10: ICD-10-CM

## 2018-03-05 DIAGNOSIS — E11.9 TYPE 2 DIABETES MELLITUS WITHOUT OPHTHALMIC MANIFESTATIONS: ICD-10-CM

## 2018-03-05 DIAGNOSIS — R89.9 ABNORMAL LABORATORY TEST: ICD-10-CM

## 2018-03-05 LAB
ANION GAP SERPL CALC-SCNC: 12 MMOL/L
BUN SERPL-MCNC: 18 MG/DL
CALCIUM SERPL-MCNC: 9.5 MG/DL
CHLORIDE SERPL-SCNC: 93 MMOL/L
CO2 SERPL-SCNC: 25 MMOL/L
CREAT SERPL-MCNC: 0.8 MG/DL
EST. GFR  (AFRICAN AMERICAN): >60 ML/MIN/1.73 M^2
EST. GFR  (NON AFRICAN AMERICAN): >60 ML/MIN/1.73 M^2
GLUCOSE SERPL-MCNC: 99 MG/DL
POTASSIUM SERPL-SCNC: 3.9 MMOL/L
SODIUM SERPL-SCNC: 130 MMOL/L

## 2018-03-05 PROCEDURE — 92020 GONIOSCOPY: CPT | Mod: S$PBB,,, | Performed by: OPHTHALMOLOGY

## 2018-03-05 PROCEDURE — 92020 GONIOSCOPY: CPT | Mod: PBBFAC | Performed by: OPHTHALMOLOGY

## 2018-03-05 PROCEDURE — 80048 BASIC METABOLIC PNL TOTAL CA: CPT

## 2018-03-05 PROCEDURE — 92012 INTRM OPH EXAM EST PATIENT: CPT | Mod: S$PBB,,, | Performed by: OPHTHALMOLOGY

## 2018-03-05 PROCEDURE — 99999 PR PBB SHADOW E&M-EST. PATIENT-LVL I: CPT | Mod: PBBFAC,,, | Performed by: OPHTHALMOLOGY

## 2018-03-05 PROCEDURE — 36415 COLL VENOUS BLD VENIPUNCTURE: CPT

## 2018-03-05 PROCEDURE — 99211 OFF/OP EST MAY X REQ PHY/QHP: CPT | Mod: PBBFAC,25 | Performed by: OPHTHALMOLOGY

## 2018-03-05 NOTE — LETTER
March 5, 2018      Prabha Gage, OD  [address]  [facility] [address]           [facility] - Ophthalmology  [address]  [facility] [address]  Phone: [phone]  Fax: [phone]          Patient: Yogesh Shay   MR Number: [id number]   YOB: 1934   Date of Visit: 3/5/2018       Dear Dr. Prabha Gage:    Thank you for referring Yogesh Shay to me for evaluation. Attached you will find relevant portions of my assessment and plan of care.    If you have questions, please do not hesitate to call me. I look forward to following Yogesh Shay along with you.    Sincerely,    Danika Romero MD    Enclosure  CC:  No Recipients    If you would like to receive this communication electronically, please contact [email or web address] or [phone] to request more information on [employer] Link access.    For providers and/or their staff who would like to refer a patient to [facility], please contact us through our one-stop-shop provider referral line, [facility] , at [phone].    If you feel you have received this communication in error or would no longer like to receive these types of communications, please e-mail [email or web address]

## 2018-03-05 NOTE — PROGRESS NOTES
HPI     DLS: 2/06/18 With Dr. Gage    Pt here for Glaucoma/Cataract consult per Dr. Gage;    Meds: No GTTS    1. Anatomical narrow angle  2. Glaucoma suspect of both eyes  3. Nuclear sclerosis of both eyes  4. Type 2 diabetes mellitus without retinopathy       Last edited by Ashli Salazar on 3/5/2018 10:16 AM. (History)            Assessment /Plan     For exam results, see Encounter Report.    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 Narrow angles // glaucoma suspect    rec laser PI OD then os    Argon then yag    Followed by repeat gonio and dilated exam    May or may not have residual glaucoma after PI's    2. NS ou    Pt does not want cataract surgery at this time    Has other health issues     3. Diabetes    Needs dilated exam once the PI's are done     4. H/O tumor / cancer RLL    S/P removal and re-construction at [facility]    The biopsy was done by amanda - but the surgery was done by some one else     5. Has some abdominal issues    Having surgery next week - 3/15/2018     PLAN    Warned of sighs of angle closure    Schedule PI OS  then OD  - argon then yag

## 2018-03-06 ENCOUNTER — HOSPITAL ENCOUNTER (OUTPATIENT)
Dept: PREADMISSION TESTING | Facility: HOSPITAL | Age: 83
Discharge: HOME | End: 2018-03-06
Attending: ANESTHESIOLOGY
Payer: MEDICARE

## 2018-03-06 ENCOUNTER — ANESTHESIA EVENT (OUTPATIENT)
Dept: SURGERY | Facility: HOSPITAL | Age: 83
End: 2018-03-06
Payer: MEDICARE

## 2018-03-06 ENCOUNTER — HOSPITAL ENCOUNTER (OUTPATIENT)
Dept: RADIOLOGY | Facility: HOSPITAL | Age: 83
Discharge: HOME | End: 2018-03-06
Attending: SURGERY
Payer: MEDICARE

## 2018-03-06 VITALS
DIASTOLIC BLOOD PRESSURE: 79 MMHG | HEIGHT: 67 IN | RESPIRATION RATE: 18 BRPM | TEMPERATURE: 99 F | BODY MASS INDEX: 24.48 KG/M2 | WEIGHT: 156 LBS | SYSTOLIC BLOOD PRESSURE: 162 MMHG | HEART RATE: 63 BPM | OXYGEN SATURATION: 98 %

## 2018-03-06 DIAGNOSIS — R63.4 UNINTENTIONAL WEIGHT LOSS: ICD-10-CM

## 2018-03-06 PROCEDURE — 74177 CT ABD & PELVIS W/CONTRAST: CPT | Mod: TC

## 2018-03-06 PROCEDURE — 25500020 PHARM REV CODE 255: Performed by: SURGERY

## 2018-03-06 PROCEDURE — 74177 CT ABD & PELVIS W/CONTRAST: CPT | Mod: 26,GC,, | Performed by: RADIOLOGY

## 2018-03-06 RX ADMIN — IOHEXOL 75 ML: 350 INJECTION, SOLUTION INTRAVENOUS at 02:03

## 2018-03-06 RX ADMIN — IOHEXOL 15 ML: 350 INJECTION, SOLUTION INTRAVENOUS at 02:03

## 2018-03-06 NOTE — DISCHARGE INSTRUCTIONS
Your surgery has been scheduled for:__________________________________________    You should report to:  ____[facility], located on the [place] side of the first floor of the           [facility] ([phone])  ____[facility], located on the [facility] side of the            Second floor of the [facility] ([phone])  ____3rd Floor [facility] located on the [facility] side of the [facility] [phone]  Please Note   - Tell your doctor if you take Aspirin, products containing Aspirin, herbal medications  or blood thinners, such as Coumadin, Ticlid, or Plavix.  (Consult your provider regarding holding or stopping before surgery).  - Arrange for someone to drive you home following surgery.  You will not be allowed to leave the surgical facility alone or drive yourself home following sedation and anesthesia.  Before Surgery  - Stop taking all herbal medications 14days prior to surgery  - No Motrin/Advil (Ibuprofen) 7 days before surgery  - No Aleve (Naproxen) 7 days before surgery  - Stop Taking Asprin, products containing Asprin _____days before surgery  - Stop taking blood thinners_______days before surgery  - Refrain from drinking alcoholic beverages for 24hours before and after surgery  - Stop or limit smoking _________days before surgery  Night before Surgery  - DO NOT EAT OR DRINK ANYTHING AFTER MIDNIGHT, INCLUDING GUM, HARD CANDY, MINTS, OR CHEWING TOBACCO.  - Take a shower or bath (shower is recommended).  Bathe with Hibiclens soap or an antibacterial soap from the neck down.  If not supplied by your surgeon, hibiclens soap will need to be purchased over the counter in pharmacy.  Rinse soap off thoroughly.  - Shampoo your hair with your regular shampoo  The Day of Surgery  - Take another bath or shower with hibiclens or any antibacterial soap, to reduce the chance of infection.  - Take heart and blood  pressure medications with a small sip of water, as advised by the perioperative team.  - Do not take fluid pills  - You may brush your teeth and rinse your mouth, but do not swall any additional water.   - Do not apply perfumes, powder, body lotions or deodorant on the day of surgery.  - Nail polish should be removed.  - Do not wear makeup or moisturizer  - Wear comfortable clothes, such as a button front shirt and loose fitting pants.  - Leave all jewelry, including body piercings, and valuables at home.    - Bring any devices you will neeed after surgery such as crutches or canes.  - If you have sleep apnea, please bring your CPAP machine  In the event that your physical condition changes including the onset of a cold or respiratory illness, or if you have to delay or cancel your surgery, please notify your surgeon.Anesthesia: General Anesthesia  Youre due to have surgery. During surgery, youll be given medication called anesthesia. (It is also called anesthetic.) This will keep you comfortable and pain-free. Your anesthesia provider will use general anesthesia. This sheet tells you more about it.  What is general anesthesia?     You are watched continuously during your procedure by the anesthesia provider   General anesthesia puts you into a state like deep sleep. It goes into the bloodstream (IV anesthetics), into the lungs (gas anesthetics), or both. You feel nothing during the procedure. You will not remember it. During the procedure, the anesthesia provider monitors you continuously. He or she checks your heart rate and rhythm, blood pressure, breathing, and blood oxygen.  · IV Anesthetics. IV anesthetics are given through an IV line in your arm. Theyre often given first. This is so you are asleep before a gas anesthetic is started. Some kinds of IV anesthetics relieve pain. Others relax you. Your doctor will decide which kind is best in your case.  · Gas Anesthetics. Gas anesthetics are breathed into the  lungs. They are often used to keep you asleep. They can be given through a facemask or a tube placed in your larynx or trachea (breathing tube).  ? If you have a facemask, your anesthesia provider will most likely place it over your nose and mouth while youre still awake. Youll breathe oxygen through the mask as your IV anesthetic is started. Gas anesthetic may be added through the mask.  ? If you have a tube in the larynx or trachea, it will be inserted into your throat after youre asleep.  Anesthesia tools and medications  You will likely have:  · IV anesthetics. These are put into an IV line into your bloodstream.  · Gas anesthetics. You breathe these anesthetics into your lungs, where they pass into your bloodstream.  · Pulse oximeter. This is a small clip that is attached to the end of your finger. This measures your blood oxygen level.  · Electrocardiography leads (electrodes). These are small sticky pads that are placed on your chest. They record your heart rate and rhythm.  · Blood pressure cuff. This reads your blood pressure.  Risks and possible complications  General anesthesia has some risks. These include:  · Breathing problems  · Nausea and vomiting  · Sore throat or hoarseness (usually temporary)  · Allergic reaction to the anesthetic  · Irregular heartbeat (rare)  · Cardiac arrest (rare)   Anesthesia safety  · Follow all instructions you are given for how long not to eat or drink before your procedure.  · Be sure your doctor knows what medications and drugs you take. This includes over-the-counter medications, herbs, supplements, alcohol or other drugs. You will be asked when those were last taken.  · Have an adult family member or friend drive you home after the procedure.  · For the first 24 hours after your surgery:  ? Do not drive or use heavy equipment.  ? Have a trusted family member or spouse make important decisions or sign documents.  ? Avoid alcohol.  ? Have a responsible adult stay with  you. He or she can watch for problems and help keep you safe.  Date Last Reviewed: 10/16/2014  © 2600-8459 [employer] [address] [place] [address], [place] [address]. All rights reserved. This information is not intended as a substitute for professional medical care. Always follow your healthcare professional's instructions.

## 2018-03-06 NOTE — ANESTHESIA PREPROCEDURE EVALUATION
03/06/2018  Yogesh Shay is a 83 y.o., male.    Anesthesia Evaluation    I have reviewed the Patient Summary Reports.    I have reviewed the Nursing Notes.   I have reviewed the Medications.     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  Denies Family Hx of Anesthesia complications.    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  Denies Sleep Apnea.    Renal/:   BPH    Hepatic/GI:   GERD Wt loss of 40 lbs over 2 yrs; poor appetite since teeth extracted   Musculoskeletal:   Arthritis (s/p L TKA )   Spine Disorders: lumbar Chronic Pain    Neurological:   Denies CVA. Denies Seizures.  Pain , onset is chronic , location of lower back , quality of aching/dull, sharp , severity is a 6   Peripheral Neuropathy    Endocrine:   Diabetes (A1C 5.6 1/26/18), well controlled, type 2        Physical Exam  General:  Well nourished    Airway/Jaw/Neck:  Airway Findings: Mouth Opening: Small, but > 3cm Tongue: Normal  General Airway Assessment: Adult  Jaw/Neck Findings:     Neck ROM: Extension Decreased, Mild      Dental:  Dental Findings:    Chest/Lungs:  Chest/Lungs Findings: Clear to auscultation, Normal Respiratory Rate     Heart/Vascular:  Heart Findings: Rate: Normal  Rhythm: Regular Rhythm  Sounds: Normal        Mental Status:  Mental Status Findings:  Cooperative, Alert and Oriented       Pt was seen in  POC 3/6/18/Shiloh Gandhi RN        Anesthesia Plan  Type of Anesthesia, risks & benefits discussed:  Anesthesia Type:  general  Patient's Preference:   Intra-op Monitoring Plan: standard ASA monitors  Intra-op Monitoring Plan Comments:   Post Op Pain Control Plan: multimodal analgesia and per primary service following discharge from PACU  Post Op Pain Control Plan Comments:   Induction:   IV  Beta Blocker:  Patient is on a Beta-Blocker and has received one dose within the past 24 hours (No further documentation required).       Informed Consent: Patient understands risks and agrees with Anesthesia plan.  Questions answered. Anesthesia consent signed with patient.  ASA Score: 3     Day of Surgery Review of History & Physical:    H&P update referred to the surgeon.         Ready For Surgery From Anesthesia Perspective.

## 2018-03-08 ENCOUNTER — TELEPHONE (OUTPATIENT)
Dept: SURGERY | Facility: CLINIC | Age: 83
End: 2018-03-08

## 2018-03-13 ENCOUNTER — TELEPHONE (OUTPATIENT)
Dept: SURGERY | Facility: CLINIC | Age: 83
End: 2018-03-13

## 2018-03-14 ENCOUNTER — HOSPITAL ENCOUNTER (OUTPATIENT)
Facility: HOSPITAL | Age: 83
Discharge: HOME | End: 2018-03-15
Attending: SURGERY | Admitting: SURGERY
Payer: MEDICARE

## 2018-03-14 ENCOUNTER — ANESTHESIA (OUTPATIENT)
Dept: SURGERY | Facility: HOSPITAL | Age: 83
End: 2018-03-14
Payer: MEDICARE

## 2018-03-14 ENCOUNTER — SURGERY (OUTPATIENT)
Age: 83
End: 2018-03-14

## 2018-03-14 DIAGNOSIS — K40.90 LEFT INGUINAL HERNIA: ICD-10-CM

## 2018-03-14 DIAGNOSIS — K43.9 VENTRAL HERNIA: ICD-10-CM

## 2018-03-14 DIAGNOSIS — K40.91 RECURRENT LEFT INGUINAL HERNIA: Primary | ICD-10-CM

## 2018-03-14 LAB
POCT GLUCOSE: 126 MG/DL (ref 70–110)
POCT GLUCOSE: 127 MG/DL (ref 70–110)

## 2018-03-14 PROCEDURE — D9220A PRA ANESTHESIA: Mod: CRNA,,, | Performed by: NURSE ANESTHETIST, CERTIFIED REGISTERED

## 2018-03-14 PROCEDURE — 94799 UNLISTED PULMONARY SVC/PX: CPT

## 2018-03-14 PROCEDURE — 36000707: Performed by: SURGERY

## 2018-03-14 PROCEDURE — 25000242 PHARM REV CODE 250 ALT 637 W/ HCPCS: Performed by: SURGERY

## 2018-03-14 PROCEDURE — 25000003 PHARM REV CODE 250: Performed by: NURSE ANESTHETIST, CERTIFIED REGISTERED

## 2018-03-14 PROCEDURE — 63600175 PHARM REV CODE 636 W HCPCS: Performed by: SURGERY

## 2018-03-14 PROCEDURE — D9220A PRA ANESTHESIA: Mod: ANES,,, | Performed by: ANESTHESIOLOGY

## 2018-03-14 PROCEDURE — 27200750 HC INSULATED NEEDLE/ STIMUPLEX: Performed by: ANESTHESIOLOGY

## 2018-03-14 PROCEDURE — 25000003 PHARM REV CODE 250: Performed by: SURGERY

## 2018-03-14 PROCEDURE — 37000009 HC ANESTHESIA EA ADD 15 MINS: Performed by: SURGERY

## 2018-03-14 PROCEDURE — 71000039 HC RECOVERY, EACH ADD'L HOUR: Performed by: SURGERY

## 2018-03-14 PROCEDURE — 82962 GLUCOSE BLOOD TEST: CPT | Performed by: SURGERY

## 2018-03-14 PROCEDURE — 94761 N-INVAS EAR/PLS OXIMETRY MLT: CPT

## 2018-03-14 PROCEDURE — 37000008 HC ANESTHESIA 1ST 15 MINUTES: Performed by: SURGERY

## 2018-03-14 PROCEDURE — 36000706: Performed by: SURGERY

## 2018-03-14 PROCEDURE — 71000033 HC RECOVERY, INTIAL HOUR: Performed by: SURGERY

## 2018-03-14 PROCEDURE — C1781 MESH (IMPLANTABLE): HCPCS | Performed by: SURGERY

## 2018-03-14 PROCEDURE — 63600175 PHARM REV CODE 636 W HCPCS: Performed by: NURSE ANESTHETIST, CERTIFIED REGISTERED

## 2018-03-14 PROCEDURE — 64486 TAP BLOCK UNIL BY INJECTION: CPT | Mod: 59,LT,, | Performed by: ANESTHESIOLOGY

## 2018-03-14 PROCEDURE — 49520 REREPAIR ING HERNIA REDUCE: CPT | Mod: LT,,, | Performed by: SURGERY

## 2018-03-14 PROCEDURE — 94640 AIRWAY INHALATION TREATMENT: CPT

## 2018-03-14 DEVICE — MESH PROLENE 3INX6IN 6/BX: Type: IMPLANTABLE DEVICE | Site: INGUINAL | Status: FUNCTIONAL

## 2018-03-14 RX ORDER — HYDROCODONE BITARTRATE AND ACETAMINOPHEN 10; 325 MG/1; MG/1
TABLET ORAL
Status: DISPENSED
Start: 2018-03-14 | End: 2018-03-14

## 2018-03-14 RX ORDER — CEFAZOLIN SODIUM 1 G/3ML
2 INJECTION, POWDER, FOR SOLUTION INTRAMUSCULAR; INTRAVENOUS
Status: COMPLETED | OUTPATIENT
Start: 2018-03-14 | End: 2018-03-14

## 2018-03-14 RX ORDER — HYDROCHLOROTHIAZIDE 12.5 MG/1
12.5 TABLET ORAL DAILY
Status: DISCONTINUED | OUTPATIENT
Start: 2018-03-14 | End: 2018-03-15

## 2018-03-14 RX ORDER — LABETALOL HYDROCHLORIDE 5 MG/ML
INJECTION, SOLUTION INTRAVENOUS
Status: DISCONTINUED | OUTPATIENT
Start: 2018-03-14 | End: 2018-03-14

## 2018-03-14 RX ORDER — ONDANSETRON 8 MG/1
8 TABLET, ORALLY DISINTEGRATING ORAL EVERY 8 HOURS PRN
Qty: 30 TABLET | Refills: 1 | Status: SHIPPED | OUTPATIENT
Start: 2018-03-14 | End: 2018-09-28 | Stop reason: SDUPTHER

## 2018-03-14 RX ORDER — ROCURONIUM BROMIDE 10 MG/ML
INJECTION, SOLUTION INTRAVENOUS
Status: DISCONTINUED | OUTPATIENT
Start: 2018-03-14 | End: 2018-03-14

## 2018-03-14 RX ORDER — DEXAMETHASONE SODIUM PHOSPHATE 4 MG/ML
INJECTION, SOLUTION INTRA-ARTICULAR; INTRALESIONAL; INTRAMUSCULAR; INTRAVENOUS; SOFT TISSUE
Status: DISCONTINUED | OUTPATIENT
Start: 2018-03-14 | End: 2018-03-14

## 2018-03-14 RX ORDER — IPRATROPIUM BROMIDE AND ALBUTEROL SULFATE 2.5; .5 MG/3ML; MG/3ML
3 SOLUTION RESPIRATORY (INHALATION)
Status: DISCONTINUED | OUTPATIENT
Start: 2018-03-14 | End: 2018-03-15 | Stop reason: HOSPADM

## 2018-03-14 RX ORDER — HYDROMORPHONE HYDROCHLORIDE 1 MG/ML
0.2 INJECTION, SOLUTION INTRAMUSCULAR; INTRAVENOUS; SUBCUTANEOUS EVERY 5 MIN PRN
Status: DISCONTINUED | OUTPATIENT
Start: 2018-03-14 | End: 2018-03-14 | Stop reason: HOSPADM

## 2018-03-14 RX ORDER — SODIUM CHLORIDE 9 MG/ML
INJECTION, SOLUTION INTRAVENOUS CONTINUOUS
Status: DISCONTINUED | OUTPATIENT
Start: 2018-03-14 | End: 2018-03-14

## 2018-03-14 RX ORDER — ONDANSETRON 2 MG/ML
INJECTION INTRAMUSCULAR; INTRAVENOUS
Status: DISCONTINUED | OUTPATIENT
Start: 2018-03-14 | End: 2018-03-14

## 2018-03-14 RX ORDER — SODIUM CHLORIDE 0.9 % (FLUSH) 0.9 %
3 SYRINGE (ML) INJECTION
Status: DISCONTINUED | OUTPATIENT
Start: 2018-03-14 | End: 2018-03-14 | Stop reason: HOSPADM

## 2018-03-14 RX ORDER — POLYETHYLENE GLYCOL 3350 17 G/17G
17 POWDER, FOR SOLUTION ORAL DAILY
Qty: 510 G | Refills: 3 | Status: SHIPPED | OUTPATIENT
Start: 2018-03-14 | End: 2020-12-23

## 2018-03-14 RX ORDER — RAMELTEON 8 MG/1
8 TABLET ORAL NIGHTLY PRN
Status: DISCONTINUED | OUTPATIENT
Start: 2018-03-14 | End: 2018-03-15 | Stop reason: HOSPADM

## 2018-03-14 RX ORDER — LIDOCAINE HCL/PF 100 MG/5ML
SYRINGE (ML) INTRAVENOUS
Status: DISCONTINUED | OUTPATIENT
Start: 2018-03-14 | End: 2018-03-14

## 2018-03-14 RX ORDER — FENTANYL CITRATE 50 UG/ML
INJECTION, SOLUTION INTRAMUSCULAR; INTRAVENOUS
Status: DISCONTINUED
Start: 2018-03-14 | End: 2018-03-14 | Stop reason: WASHOUT

## 2018-03-14 RX ORDER — HYDROCODONE BITARTRATE AND ACETAMINOPHEN 5; 325 MG/1; MG/1
1 TABLET ORAL EVERY 4 HOURS PRN
Status: DISCONTINUED | OUTPATIENT
Start: 2018-03-14 | End: 2018-03-15 | Stop reason: HOSPADM

## 2018-03-14 RX ORDER — NAPROXEN SODIUM 220 MG/1
81 TABLET, FILM COATED ORAL DAILY
Status: DISCONTINUED | OUTPATIENT
Start: 2018-03-14 | End: 2018-03-15 | Stop reason: HOSPADM

## 2018-03-14 RX ORDER — PROPOFOL 10 MG/ML
VIAL (ML) INTRAVENOUS
Status: DISCONTINUED | OUTPATIENT
Start: 2018-03-14 | End: 2018-03-14

## 2018-03-14 RX ORDER — BUPIVACAINE HYDROCHLORIDE 2.5 MG/ML
INJECTION, SOLUTION EPIDURAL; INFILTRATION; INTRACAUDAL
Status: DISCONTINUED | OUTPATIENT
Start: 2018-03-14 | End: 2018-03-14 | Stop reason: HOSPADM

## 2018-03-14 RX ORDER — ACETAMINOPHEN 325 MG/1
650 TABLET ORAL EVERY 4 HOURS PRN
Status: DISCONTINUED | OUTPATIENT
Start: 2018-03-14 | End: 2018-03-15 | Stop reason: HOSPADM

## 2018-03-14 RX ORDER — DIPHENHYDRAMINE HYDROCHLORIDE 50 MG/ML
12.5 INJECTION INTRAMUSCULAR; INTRAVENOUS EVERY 6 HOURS PRN
Status: DISCONTINUED | OUTPATIENT
Start: 2018-03-14 | End: 2018-03-15 | Stop reason: HOSPADM

## 2018-03-14 RX ORDER — HEPARIN SODIUM 5000 [USP'U]/ML
5000 INJECTION, SOLUTION INTRAVENOUS; SUBCUTANEOUS EVERY 8 HOURS
Status: DISCONTINUED | OUTPATIENT
Start: 2018-03-15 | End: 2018-03-15 | Stop reason: HOSPADM

## 2018-03-14 RX ORDER — ONDANSETRON 2 MG/ML
4 INJECTION INTRAMUSCULAR; INTRAVENOUS EVERY 6 HOURS PRN
Status: DISCONTINUED | OUTPATIENT
Start: 2018-03-14 | End: 2018-03-15 | Stop reason: HOSPADM

## 2018-03-14 RX ORDER — GABAPENTIN 300 MG/1
300 CAPSULE ORAL NIGHTLY
Status: DISCONTINUED | OUTPATIENT
Start: 2018-03-14 | End: 2018-03-15 | Stop reason: HOSPADM

## 2018-03-14 RX ORDER — TAMSULOSIN HYDROCHLORIDE 0.4 MG/1
1 CAPSULE ORAL DAILY
Status: DISCONTINUED | OUTPATIENT
Start: 2018-03-14 | End: 2018-03-15 | Stop reason: HOSPADM

## 2018-03-14 RX ORDER — FENTANYL CITRATE 50 UG/ML
INJECTION, SOLUTION INTRAMUSCULAR; INTRAVENOUS
Status: DISCONTINUED | OUTPATIENT
Start: 2018-03-14 | End: 2018-03-14

## 2018-03-14 RX ORDER — PRAVASTATIN SODIUM 10 MG/1
10 TABLET ORAL DAILY
Status: DISCONTINUED | OUTPATIENT
Start: 2018-03-14 | End: 2018-03-15 | Stop reason: HOSPADM

## 2018-03-14 RX ORDER — NEOSTIGMINE METHYLSULFATE 1 MG/ML
INJECTION, SOLUTION INTRAVENOUS
Status: DISCONTINUED | OUTPATIENT
Start: 2018-03-14 | End: 2018-03-14

## 2018-03-14 RX ORDER — HYDROCODONE BITARTRATE AND ACETAMINOPHEN 5; 325 MG/1; MG/1
1 TABLET ORAL EVERY 6 HOURS PRN
Qty: 50 TABLET | Refills: 0 | Status: SHIPPED | OUTPATIENT
Start: 2018-03-14 | End: 2018-04-30 | Stop reason: SDUPTHER

## 2018-03-14 RX ORDER — GLYCOPYRROLATE 0.2 MG/ML
INJECTION INTRAMUSCULAR; INTRAVENOUS
Status: DISCONTINUED | OUTPATIENT
Start: 2018-03-14 | End: 2018-03-14

## 2018-03-14 RX ORDER — PANTOPRAZOLE SODIUM 40 MG/1
40 TABLET, DELAYED RELEASE ORAL DAILY
Status: DISCONTINUED | OUTPATIENT
Start: 2018-03-14 | End: 2018-03-15 | Stop reason: HOSPADM

## 2018-03-14 RX ORDER — HYDROCODONE BITARTRATE AND ACETAMINOPHEN 10; 325 MG/1; MG/1
1 TABLET ORAL EVERY 4 HOURS PRN
Status: DISCONTINUED | OUTPATIENT
Start: 2018-03-14 | End: 2018-03-15 | Stop reason: HOSPADM

## 2018-03-14 RX ORDER — SODIUM CHLORIDE 9 MG/ML
INJECTION, SOLUTION INTRAVENOUS CONTINUOUS
Status: DISCONTINUED | OUTPATIENT
Start: 2018-03-14 | End: 2018-03-15

## 2018-03-14 RX ADMIN — LABETALOL HYDROCHLORIDE 5 MG: 5 INJECTION, SOLUTION INTRAVENOUS at 10:03

## 2018-03-14 RX ADMIN — LIDOCAINE HYDROCHLORIDE 40 MG: 20 INJECTION, SOLUTION INTRAVENOUS at 09:03

## 2018-03-14 RX ADMIN — PROPOFOL 30 MG: 10 INJECTION, EMULSION INTRAVENOUS at 10:03

## 2018-03-14 RX ADMIN — NEOSTIGMINE METHYLSULFATE 4 MG: 1 INJECTION INTRAVENOUS at 10:03

## 2018-03-14 RX ADMIN — SODIUM CHLORIDE, SODIUM GLUCONATE, SODIUM ACETATE, POTASSIUM CHLORIDE, MAGNESIUM CHLORIDE, SODIUM PHOSPHATE, DIBASIC, AND POTASSIUM PHOSPHATE: .53; .5; .37; .037; .03; .012; .00082 INJECTION, SOLUTION INTRAVENOUS at 10:03

## 2018-03-14 RX ADMIN — PANTOPRAZOLE SODIUM 40 MG: 40 TABLET, DELAYED RELEASE ORAL at 12:03

## 2018-03-14 RX ADMIN — AMPICILLIN SODIUM AND SULBACTAM SODIUM 3 G: 2; 1 INJECTION, POWDER, FOR SOLUTION INTRAMUSCULAR; INTRAVENOUS at 01:03

## 2018-03-14 RX ADMIN — HYDROCHLOROTHIAZIDE 12.5 MG: 12.5 TABLET ORAL at 12:03

## 2018-03-14 RX ADMIN — HYDROCODONE BITARTRATE AND ACETAMINOPHEN 1 TABLET: 10; 325 TABLET ORAL at 03:03

## 2018-03-14 RX ADMIN — CEFAZOLIN 2 G: 330 INJECTION, POWDER, FOR SOLUTION INTRAMUSCULAR; INTRAVENOUS at 10:03

## 2018-03-14 RX ADMIN — PROPOFOL 20 MG: 10 INJECTION, EMULSION INTRAVENOUS at 10:03

## 2018-03-14 RX ADMIN — ONDANSETRON 4 MG: 2 INJECTION INTRAMUSCULAR; INTRAVENOUS at 10:03

## 2018-03-14 RX ADMIN — GLYCOPYRROLATE 0.2 MG: 0.2 INJECTION, SOLUTION INTRAMUSCULAR; INTRAVENOUS at 10:03

## 2018-03-14 RX ADMIN — BUPIVACAINE HYDROCHLORIDE 6 ML: 2.5 INJECTION, SOLUTION EPIDURAL; INFILTRATION; INTRACAUDAL; PERINEURAL at 10:03

## 2018-03-14 RX ADMIN — DEXAMETHASONE SODIUM PHOSPHATE 4 MG: 4 INJECTION, SOLUTION INTRAMUSCULAR; INTRAVENOUS at 10:03

## 2018-03-14 RX ADMIN — ROCURONIUM BROMIDE 35 MG: 10 INJECTION, SOLUTION INTRAVENOUS at 09:03

## 2018-03-14 RX ADMIN — TAMSULOSIN HYDROCHLORIDE 0.4 MG: 0.4 CAPSULE ORAL at 12:03

## 2018-03-14 RX ADMIN — PROPOFOL 100 MG: 10 INJECTION, EMULSION INTRAVENOUS at 09:03

## 2018-03-14 RX ADMIN — IPRATROPIUM BROMIDE AND ALBUTEROL SULFATE 3 ML: 2.5; .5 SOLUTION RESPIRATORY (INHALATION) at 01:03

## 2018-03-14 RX ADMIN — SODIUM CHLORIDE: 0.9 INJECTION, SOLUTION INTRAVENOUS at 09:03

## 2018-03-14 RX ADMIN — PRAVASTATIN SODIUM 10 MG: 10 TABLET ORAL at 01:03

## 2018-03-14 RX ADMIN — GABAPENTIN 300 MG: 300 CAPSULE ORAL at 08:03

## 2018-03-14 RX ADMIN — FENTANYL CITRATE 50 MCG: 50 INJECTION, SOLUTION INTRAMUSCULAR; INTRAVENOUS at 10:03

## 2018-03-14 RX ADMIN — ASPIRIN 81 MG CHEWABLE TABLET 81 MG: 81 TABLET CHEWABLE at 12:03

## 2018-03-14 RX ADMIN — FENTANYL CITRATE 50 MCG: 50 INJECTION, SOLUTION INTRAMUSCULAR; INTRAVENOUS at 09:03

## 2018-03-14 RX ADMIN — HYDROCODONE BITARTRATE AND ACETAMINOPHEN 1 TABLET: 10; 325 TABLET ORAL at 11:03

## 2018-03-14 RX ADMIN — HYDROCODONE BITARTRATE AND ACETAMINOPHEN 1 TABLET: 10; 325 TABLET ORAL at 08:03

## 2018-03-14 RX ADMIN — AMPICILLIN SODIUM AND SULBACTAM SODIUM 3 G: 2; 1 INJECTION, POWDER, FOR SOLUTION INTRAMUSCULAR; INTRAVENOUS at 08:03

## 2018-03-14 RX ADMIN — IPRATROPIUM BROMIDE AND ALBUTEROL SULFATE 3 ML: 2.5; .5 SOLUTION RESPIRATORY (INHALATION) at 08:03

## 2018-03-14 NOTE — H&P (VIEW-ONLY)
History & Physical    SUBJECTIVE:     History of Present Illness:  Patient is a 83 y.o. male presents with left inguinal hernia.  He is s/p open bih with mesh on the right side (complicated by mesh removal) and without mesh on the left side around 1966 and redo lih 1976.  Over the last 6 months he noticed a bulge in the left groin associated with throbbing pain.  The pain is intermittent and random.  There are no aggravating or relieving factors.  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      Chief Complaint   Patient presents with    Hernia     LIH       Review of patient's allergies indicates:   Allergen Reactions    Sulfa (sulfonamide antibiotics) Swelling       Current Outpatient Prescriptions   Medication Sig Dispense Refill    aspirin 81 mg Tab Take by mouth. 1 Tablet Oral Every day      atenolol (TENORMIN) 50 MG tablet TAKE ONE AND ONE-HALF TABLETS TWICE A  tablet 2    diclofenac (CATAFLAM) 50 MG tablet TAKE 1 TABLET THREE TIMES A  tablet 3    gabapentin (NEURONTIN) 300 MG capsule TAKE 1 CAPSULE EVERY EVENING AT BEDTIME 90 capsule 3    hydroCHLOROthiazide (MICROZIDE) 12.5 mg capsule Take 1 capsule (12.5 mg total) by mouth once daily. 90 capsule 3    hydrocodone-acetaminophen 5-325mg (NORCO) 5-325 mg per tablet Take 1 tablet by mouth every 6 (six) hours as needed for Pain. 60 tablet 0    lisinopril 10 MG tablet Take 1 tablet (10 mg total) by mouth once daily. 90 tablet 3    metformin (GLUCOPHAGE) 500 MG tablet TAKE 1 TABLET TWICE A DAY WITH MEALS 180 tablet 3    omeprazole (PRILOSEC) 20 MG capsule Take 1 capsule (20 mg total) by mouth once daily. 90 capsule 3    pravastatin (PRAVACHOL) 10 MG tablet TAKE 1 TABLET DAILY 90 tablet 3    tamsulosin (FLOMAX) 0.4 mg Cp24 TAKE 1 CAPSULE DAILY 90 capsule 2     No current facility-administered medications for this visit.        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    Heart disease Father      ?    Cancer Sister      ?    No Known Problems Brother     No Known Problems Maternal Aunt     No Known Problems Maternal Uncle     No Known Problems Paternal Aunt     No Known Problems Paternal Uncle     No Known Problems Maternal Grandmother     No Known Problems Maternal Grandfather     No Known Problems Paternal Grandmother     No Known Problems Paternal Grandfather     Melanoma Neg Hx     Amblyopia Neg Hx     Blindness Neg Hx     Cataracts Neg Hx     Diabetes Neg Hx     Glaucoma Neg Hx     Hypertension Neg Hx     Macular degeneration Neg Hx     Retinal detachment Neg Hx     Strabismus Neg Hx     Stroke Neg Hx     Thyroid disease Neg Hx      Social History   Substance Use Topics    Smoking status: Former Smoker     Types: Cigarettes     Quit date: 9/25/1975    Smokeless tobacco: Former User    Alcohol use No        Review of Systems:  Review of Systems   Constitutional: Positive for unexpected weight change. Negative for fever.        Lost 40 pounds after his teeth were removed and with loss of appetite   Respiratory: Negative for cough, chest tightness and shortness of breath.    Cardiovascular: Negative for chest pain.   Gastrointestinal: Positive for abdominal pain, diarrhea and nausea. Negative for constipation and vomiting.        " Has epigastric pain due to gerd   Genitourinary: Negative for difficulty urinating and dysuria.   Skin: Negative for rash and wound.   Neurological: Negative for seizures and headaches.   Hematological: Does not bruise/bleed easily.       OBJECTIVE:     Vital Signs (Most Recent)  Temp: 98.3 °F (36.8 °C) (02/15/18 0949)  Pulse: 94 (02/15/18 0949)  BP: (!) 194/102 (02/15/18 0949)  5' 7" (1.702 m)  69.9 kg (154 lb)     Physical Exam:  Physical Exam   Constitutional: He is oriented to person, place, and time. He appears well-developed and well-nourished.   Neck: Normal range of motion. Neck supple.   Cardiovascular: Normal rate, regular rhythm and normal heart sounds.    Pulmonary/Chest: Effort normal and breath sounds normal.   Abdominal: Soft. Bowel sounds are normal. There is no tenderness.   Genitourinary:         Neurological: He is alert and oriented to person, place, and time.   Skin: Skin is warm and dry.   Psychiatric: He has a normal mood and affect. His behavior is normal. Judgment and thought content normal.   Vitals reviewed.      Laboratory  CBC: Reviewed  CMP: Reviewed    Diagnostic Results:  ECG: Reviewed  US: Reviewed    ASSESSMENT/PLAN:     Recurrent lih.  Losing weight with nausea (also has had teeth removed).    PLAN:    Open repair lih with mesh.  Anesthesia clearance.  23 hour stay.  Will get CT.              "

## 2018-03-14 NOTE — OP NOTE
DATE OF PROCEDURE:  03/14/2018    ATTENDING:  Jose Zhong M.D.    ASSISTANT:  Kyara Vallejo M.D. (RES)    PREOPERATIVE DIAGNOSIS:  Recurrent left inguinal hernia.    POSTOPERATIVE DIAGNOSIS:  Recurrent left inguinal hernia.    PROCEDURE PERFORMED:  Open left inguinal hernia repair with mesh.    ANESTHESIA:  General.    BLOOD LOSS:  2 mL.    IV FLUIDS:  1000 mL of crystalloid.    DRAINS:  None.    SPECIMENS:  None.    COMPLICATIONS:  None.    INDICATIONS:  This is a pleasant 83-year-old male with a history of two prior   left inguinal hernia repairs.  These were done remotely and were thoughts likely   be tissue repairs given the remote timeframe.  He has a noticeable increasing   bulge in the left groin area and this was consistent with a fatty inguinal   hernia on CT scan.  We presented to the Operating Room today following   appropriate informed consent.    OPERATIVE PROCEDURE:  Following appropriate informed consent, the patient was   taken to the Operating Room where general anesthesia was induced without   complication.  The patient was clipped, prepped and draped in the standard   sterile fashion.  The patient received 2 g of IV cefazolin for preoperative   antibiotic prophylaxis.  Following a WHO-appropriate timeout, an approximately 7   cm incision was made along Yo's lines over the left groin region.    Subcutaneous tissue was divided in the standard fashion with Bovie   electrocautery.  Once the external oblique fascia was encountered, this was   incised with a #15 blade scalpel then extended using the Metzenbaum scissors.    There was significant scarring related to his prior tissue hernia repairs and it   appeared that they had performed a layered closure previously.  We could   immediately see the hernia defect, which appeared to be medial and inferior   location coming out of the external ring.  We did attempt to isolate what was   left of his minimal cord structures, which did include  obvious identification of   the vas, which was protected along with some smaller vessels and cremaster   muscle.  This was looped with a Penrose drain.  We then fully isolated the   hernia and reduced this back into the internal ring.  Given the remainder of his   tissue repair seemed to be intact we elected to repair this with a Prolene   patch mesh, which was cut to the size of the patient.  This was then sutured in   place with 2-0 Prolenes anchored at both the inferior medial edge of the   tubercle as well as laterally taking the internal oblique muscle and   reapproximating it to the adjacent shelving edge region.  We then in a circular   fashion secured the mesh along all edges for a nice patch repair.  Penrose drain   was then removed and cord replaced with the external oblique fascia closed in a   running fashion with a 2-0 Vicryl suture.  Subcutaneous tissue was irrigated   and Cynthia fascia was closed with a running 3-0 Vicryl.  Skin was closed with a   4-0 Monocryl in a standard subcuticular fashion.  Anesthesia then came in and   additionally, perform a tap block for additional local anesthesia.  The patient   was subsequently awakened from general anesthesia and taken to the   Postanesthesia Care Unit in stable condition.  Dr. Zhong was present and   scrubbed for the entire procedure and there were no complications.    DICTATED BY:  Kyara Vallejo M.D. (RES)      SEB/GARRET  dd: 03/14/2018 11:03:08 (CDT)  td: 03/14/2018 11:51:33 (CDT)  Doc ID   #[id number]  Job ID #[id number]    CC:

## 2018-03-14 NOTE — ANESTHESIA POSTPROCEDURE EVALUATION
"Anesthesia Post Evaluation    Patient: Yogesh Shay    Procedure(s) Performed: Procedure(s) (LRB):  REPAIR-HERNIA-INGUINAL-OPEN-LEFT w/ mesh (Left)    Final Anesthesia Type: general  Patient location during evaluation: PACU  Patient participation: Yes- Able to Participate  Level of consciousness: awake and alert and oriented  Post-procedure vital signs: reviewed and stable  Pain management: adequate  Airway patency: patent  PONV status at discharge: No PONV  Anesthetic complications: no      Cardiovascular status: hemodynamically stable  Respiratory status: unassisted and spontaneous ventilation  Hydration status: euvolemic  Follow-up not needed.        Visit Vitals  BP (!) 176/89 (BP Location: Right arm, Patient Position: Lying)   Pulse 72   Temp 36.6 °C (97.8 °F) (Oral)   Resp 19   Ht 5' 7" (1.702 m)   Wt 69.9 kg (154 lb)   SpO2 (!) 94%   BMI 24.12 kg/m²       Pain/Marian Score: Pain Assessment Performed: Yes (3/14/2018 12:15 PM)  Presence of Pain: complains of pain/discomfort (3/14/2018 12:15 PM)  Pain Rating Prior to Med Admin: 8 (3/14/2018 11:22 AM)  Marian Score: 10 (3/14/2018 12:15 PM)      "

## 2018-03-14 NOTE — NURSING TRANSFER
Nursing Transfer Note      3/14/2018     Transfer To: 510A    Transfer via stretcher    Transfer with n/a    Transported by yes    Medicines sent: yes    Chart send with patient: Yes    Notified: spouse

## 2018-03-14 NOTE — PROGRESS NOTES
Pt arrived to unit via stretcher. AA+O times 4. Follows commands. Denies numbness and tingling. Dressing clean dry and intact. Rates pain as tolerable. Denies nausea. Oriented to room. Call bell in reach. Advised pt and family to call with ant concerns. Will continue to monitor.

## 2018-03-14 NOTE — TRANSFER OF CARE
"Anesthesia Transfer of Care Note    Patient: Yogesh Shay    Procedure(s) Performed: Procedure(s) (LRB):  REPAIR-HERNIA-INGUINAL-OPEN-LEFT w/ mesh (Left)    Patient location: PACU    Anesthesia Type: general    Transport from OR: Transported from OR on room air with adequate spontaneous ventilation    Post pain: adequate analgesia    Post assessment: tolerated procedure well and no apparent anesthetic complications    Post vital signs: stable    Level of consciousness: oriented, alert and awake    Nausea/Vomiting: no nausea/vomiting    Complications: none    Transfer of care protocol was followed      Last vitals:   Visit Vitals  /75 (BP Location: Right arm)   Pulse 68   Temp 36.6 °C (97.8 °F) (Oral)   Resp 18   Ht 5' 7" (1.702 m)   Wt 69.9 kg (154 lb)   SpO2 97%   BMI 24.12 kg/m²     "

## 2018-03-14 NOTE — BRIEF OP NOTE
Operative Note       Surgery Date: 3/14/2018     Surgeon(s) and Role:     * Jose Zhong MD - Primary     * Kyara Vallejo MD - Resident - Assisting    Pre-op Diagnosis:  Left inguinal hernia [K40.90]    Post-op Diagnosis:  Left inguinal hernia [K40.90]    Procedure(s) (LRB):  REPAIR-HERNIA-INGUINAL-OPEN-LEFT w/ mesh (Left)    Anesthesia: General    Procedure in Detail/Findings:  A lot of scarring and difficult planes.  Cord preserved.  Direct type of recurrence, repaired with mesh.  Ilioinguinal nerve not identified due to too much scar tissue.     Estimated Blood Loss: Minimal           Specimens     None        Implants:   Implant Name Type Inv. Item Serial No.  Lot No. LRB No. Used   MESH PROLENE [id number] 6/BX - [id number]   MESH PROLENE [id number] 6/BX   [employer] [id number] Left 1              Disposition: PACU - hemodynamically stable.           Condition: Good    Attestation:  I was present and scrubbed for the entire procedure.

## 2018-03-14 NOTE — BRIEF OP NOTE
[facility]  Brief Operative Note     SUMMARY     Surgery Date: 3/14/2018     Surgeon(s) and Role:     * Jose Zhong MD - Primary     * Kyara Vallejo MD - Resident - Assisting        Pre-op Diagnosis:  Left inguinal hernia [K40.90]    Post-op Diagnosis:  Post-Op Diagnosis Codes:     * Left inguinal hernia [K40.90]    Procedure(s) (LRB):  REPAIR-HERNIA-INGUINAL-OPEN-LEFT w/ mesh (Left)    Anesthesia: General    Description/Findings of the procedure:  No complications, good hemostasis.    Estimated Blood Loss:  Minimal    Specimens:   Specimen (12h ago through future)    None          Discharge Note    SUMMARY     Admit Date: 3/14/2018    Discharge Date and Time: Tomorrow    Hospital Course (synopsis of major diagnoses, care, treatment, and services provided during the course of the hospital stay): No complications, patient discharged home after routine outpatient surgery.        Final Diagnosis: Post-Op Diagnosis Codes:     * Left inguinal hernia [K40.90]    Disposition: Home or Self Care    Follow Up/Patient Instructions:     Medications:  Reconciled Home Medications:   Current Discharge Medication List      START taking these medications    Details   ondansetron (ZOFRAN-ODT) 8 MG TbDL Take 1 tablet (8 mg total) by mouth every 8 (eight) hours as needed (nausea).  Qty: 30 tablet, Refills: 1      polyethylene glycol (GLYCOLAX) 17 gram/dose powder Take 17 g by mouth once daily.  Qty: 510 g, Refills: 3         CONTINUE these medications which have CHANGED    Details   hydrocodone-acetaminophen 5-325mg (NORCO) 5-325 mg per tablet Take 1 tablet by mouth every 6 (six) hours as needed for Pain.  Qty: 50 tablet, Refills: 0         CONTINUE these medications which have NOT CHANGED    Details   atenolol (TENORMIN) 50 MG tablet TAKE ONE AND ONE-HALF TABLETS TWICE A DAY  Qty: 270 tablet, Refills: 2      diclofenac (CATAFLAM) 50 MG tablet TAKE 1 TABLET THREE TIMES A DAY  Qty: 270 tablet, Refills: 3       lisinopril 10 MG tablet Take 1 tablet (10 mg total) by mouth once daily.  Qty: 90 tablet, Refills: 3    Associated Diagnoses: Essential hypertension; Hypertensive urgency      metformin (GLUCOPHAGE) 500 MG tablet TAKE 1 TABLET TWICE A DAY WITH MEALS  Qty: 180 tablet, Refills: 3      pravastatin (PRAVACHOL) 10 MG tablet TAKE 1 TABLET DAILY  Qty: 90 tablet, Refills: 3      aspirin 81 mg Tab Take by mouth every evening. 1 Tablet Oral Every day      gabapentin (NEURONTIN) 300 MG capsule TAKE 1 CAPSULE EVERY EVENING AT BEDTIME  Qty: 90 capsule, Refills: 3      hydroCHLOROthiazide (MICROZIDE) 12.5 mg capsule Take 1 capsule (12.5 mg total) by mouth once daily.  Qty: 90 capsule, Refills: 3      omeprazole (PRILOSEC) 20 MG capsule Take 1 capsule (20 mg total) by mouth once daily.  Qty: 90 capsule, Refills: 3    Associated Diagnoses: GERD (gastroesophageal reflux disease)      tamsulosin (FLOMAX) 0.4 mg Cp24 TAKE 1 CAPSULE DAILY  Qty: 90 capsule, Refills: 2           No discharge procedures on file.  Follow-up Information     Jose Zhong MD In 2 weeks.    Specialties:  General Surgery, Bariatrics  Why:  Post-op appointment in 2 weeks  Contact information:  [address]  [facility] [id number] [phone]

## 2018-03-14 NOTE — ANESTHESIA PROCEDURE NOTES
TAP single injection    Patient location during procedure: OR   Block not for primary anesthetic.  Reason for block: at surgeon's request and post-op pain management   Post-op Pain Location: Left abdominal wall pain  Start time: 3/14/2018 10:50 AM  Timeout: 3/14/2018 10:49 AM   End time: 3/14/2018 10:59 AM  Staffing  Anesthesiologist: ABEL PRICE  Resident/CRNA: JJ LAGUNAS  Performed: anesthesiologist and resident/CRNA   Preanesthetic Checklist  Completed: patient identified, site marked, surgical consent, pre-op evaluation, timeout performed, IV checked, risks and benefits discussed and monitors and equipment checked  Peripheral Block  Patient position: supine  Prep: ChloraPrep  Patient monitoring: cardiac monitor, heart rate, continuous pulse ox, continuous capnometry and frequent blood pressure checks  Block type: TAP  Laterality: left  Injection technique: single shot  Needle  Needle type: Stimuplex   Needle gauge: 21 G  Needle length: 4 in  Needle localization: ultrasound guidance     Assessment  Injection assessment: negative aspiration, negative parasthesia and local visualized surrounding nerve  Paresthesia pain: none  Heart rate change: no  Slow fractionated injection: yes  Medications:  Bolus administered: 30 mL of 0.25 bupivacaine  Epinephrine added: 3.75 mcg/mL (1/300,000)  Additional Notes  VSS.   Clonidine 50 mcg and decadron 2 mg mixed with local anesthetic  Performed during emergence of anesthesia  No complications

## 2018-03-15 VITALS
WEIGHT: 149.94 LBS | TEMPERATURE: 97 F | BODY MASS INDEX: 23.53 KG/M2 | RESPIRATION RATE: 18 BRPM | DIASTOLIC BLOOD PRESSURE: 68 MMHG | OXYGEN SATURATION: 97 % | HEIGHT: 67 IN | HEART RATE: 88 BPM | SYSTOLIC BLOOD PRESSURE: 131 MMHG

## 2018-03-15 LAB
ANION GAP SERPL CALC-SCNC: 12 MMOL/L
BASOPHILS # BLD AUTO: 0.01 K/UL
BASOPHILS NFR BLD: 0.1 %
BUN SERPL-MCNC: 22 MG/DL
CALCIUM SERPL-MCNC: 9.3 MG/DL
CHLORIDE SERPL-SCNC: 98 MMOL/L
CO2 SERPL-SCNC: 21 MMOL/L
CREAT SERPL-MCNC: 0.9 MG/DL
DIFFERENTIAL METHOD: ABNORMAL
EOSINOPHIL # BLD AUTO: 0 K/UL
EOSINOPHIL NFR BLD: 0 %
ERYTHROCYTE [DISTWIDTH] IN BLOOD BY AUTOMATED COUNT: 13.1 %
EST. GFR  (AFRICAN AMERICAN): >60 ML/MIN/1.73 M^2
EST. GFR  (NON AFRICAN AMERICAN): >60 ML/MIN/1.73 M^2
GLUCOSE SERPL-MCNC: 168 MG/DL
HCT VFR BLD AUTO: 38.5 %
HGB BLD-MCNC: 13.2 G/DL
IMM GRANULOCYTES # BLD AUTO: 0.07 K/UL
IMM GRANULOCYTES NFR BLD AUTO: 0.5 %
LYMPHOCYTES # BLD AUTO: 0.9 K/UL
LYMPHOCYTES NFR BLD: 5.9 %
MAGNESIUM SERPL-MCNC: 1.5 MG/DL
MCH RBC QN AUTO: 33.1 PG
MCHC RBC AUTO-ENTMCNC: 34.3 G/DL
MCV RBC AUTO: 97 FL
MONOCYTES # BLD AUTO: 1.2 K/UL
MONOCYTES NFR BLD: 8 %
NEUTROPHILS # BLD AUTO: 13.3 K/UL
NEUTROPHILS NFR BLD: 85.5 %
NRBC BLD-RTO: 0 /100 WBC
PHOSPHATE SERPL-MCNC: 4.6 MG/DL
PLATELET # BLD AUTO: 218 K/UL
PMV BLD AUTO: 8.9 FL
POCT GLUCOSE: 135 MG/DL (ref 70–110)
POTASSIUM SERPL-SCNC: 4.4 MMOL/L
RBC # BLD AUTO: 3.99 M/UL
SODIUM SERPL-SCNC: 131 MMOL/L
TROPONIN I SERPL DL<=0.01 NG/ML-MCNC: 0.04 NG/ML
WBC # BLD AUTO: 15.47 K/UL

## 2018-03-15 PROCEDURE — 25000242 PHARM REV CODE 250 ALT 637 W/ HCPCS: Performed by: SURGERY

## 2018-03-15 PROCEDURE — G8987 SELF CARE CURRENT STATUS: HCPCS | Mod: CI

## 2018-03-15 PROCEDURE — 83735 ASSAY OF MAGNESIUM: CPT

## 2018-03-15 PROCEDURE — 97530 THERAPEUTIC ACTIVITIES: CPT

## 2018-03-15 PROCEDURE — 82962 GLUCOSE BLOOD TEST: CPT | Performed by: SURGERY

## 2018-03-15 PROCEDURE — 97165 OT EVAL LOW COMPLEX 30 MIN: CPT

## 2018-03-15 PROCEDURE — 25000003 PHARM REV CODE 250: Performed by: SURGERY

## 2018-03-15 PROCEDURE — 94761 N-INVAS EAR/PLS OXIMETRY MLT: CPT

## 2018-03-15 PROCEDURE — 63600175 PHARM REV CODE 636 W HCPCS: Performed by: SURGERY

## 2018-03-15 PROCEDURE — 85025 COMPLETE CBC W/AUTO DIFF WBC: CPT

## 2018-03-15 PROCEDURE — 36415 COLL VENOUS BLD VENIPUNCTURE: CPT

## 2018-03-15 PROCEDURE — 80048 BASIC METABOLIC PNL TOTAL CA: CPT

## 2018-03-15 PROCEDURE — 97161 PT EVAL LOW COMPLEX 20 MIN: CPT

## 2018-03-15 PROCEDURE — G8988 SELF CARE GOAL STATUS: HCPCS | Mod: CI

## 2018-03-15 PROCEDURE — 84100 ASSAY OF PHOSPHORUS: CPT

## 2018-03-15 PROCEDURE — 97116 GAIT TRAINING THERAPY: CPT

## 2018-03-15 PROCEDURE — G8989 SELF CARE D/C STATUS: HCPCS | Mod: CI

## 2018-03-15 PROCEDURE — 94640 AIRWAY INHALATION TREATMENT: CPT

## 2018-03-15 PROCEDURE — 84484 ASSAY OF TROPONIN QUANT: CPT

## 2018-03-15 RX ORDER — MAGNESIUM SULFATE/D5W 2 G/50 ML
2 INTRAVENOUS SOLUTION, PIGGYBACK (ML) INTRAVENOUS ONCE
Status: COMPLETED | OUTPATIENT
Start: 2018-03-15 | End: 2018-03-15

## 2018-03-15 RX ORDER — ATENOLOL 25 MG/1
25 TABLET ORAL DAILY
Status: DISCONTINUED | OUTPATIENT
Start: 2018-03-16 | End: 2018-03-15 | Stop reason: HOSPADM

## 2018-03-15 RX ADMIN — IPRATROPIUM BROMIDE AND ALBUTEROL SULFATE 3 ML: 2.5; .5 SOLUTION RESPIRATORY (INHALATION) at 01:03

## 2018-03-15 RX ADMIN — SODIUM CHLORIDE 1000 ML: 0.9 INJECTION, SOLUTION INTRAVENOUS at 11:03

## 2018-03-15 RX ADMIN — ASPIRIN 81 MG CHEWABLE TABLET 81 MG: 81 TABLET CHEWABLE at 08:03

## 2018-03-15 RX ADMIN — PRAVASTATIN SODIUM 10 MG: 10 TABLET ORAL at 08:03

## 2018-03-15 RX ADMIN — PANTOPRAZOLE SODIUM 40 MG: 40 TABLET, DELAYED RELEASE ORAL at 08:03

## 2018-03-15 RX ADMIN — ONDANSETRON 4 MG: 2 INJECTION INTRAMUSCULAR; INTRAVENOUS at 10:03

## 2018-03-15 RX ADMIN — TAMSULOSIN HYDROCHLORIDE 0.4 MG: 0.4 CAPSULE ORAL at 08:03

## 2018-03-15 RX ADMIN — AMPICILLIN SODIUM AND SULBACTAM SODIUM 3 G: 2; 1 INJECTION, POWDER, FOR SOLUTION INTRAMUSCULAR; INTRAVENOUS at 03:03

## 2018-03-15 RX ADMIN — Medication 2 G: at 01:03

## 2018-03-15 RX ADMIN — AMPICILLIN SODIUM AND SULBACTAM SODIUM 3 G: 2; 1 INJECTION, POWDER, FOR SOLUTION INTRAMUSCULAR; INTRAVENOUS at 08:03

## 2018-03-15 NOTE — PROGRESS NOTES
Pt discharged to home via wheelchair. Pt denies pain at the present time. Condition stable. Follows commands. Pt given prescriptions and copy of discharge home care instructions. Pt verbalized understanding of activity limitations and s/s of when to call the Dr. Pt also given information on followup appointment. All belongings with the pt. Pt verbalized understanding of all discharge instructions.

## 2018-03-15 NOTE — PLAN OF CARE
Problem: Physical Therapy Goal  Goal: Physical Therapy Goal  Goals to be met by: 3/25/18    Patient will increase functional independence with mobility by performin. Gait  X >500 feet with Supervision using a SPC or quad cane.   2. Lower extremity exercise program x20 reps per handout, with supervision    Outcome: Ongoing (interventions implemented as appropriate)  PT evaluation complete. Recommending HHPT. Please see full PT evaluation note for details.     Julianne Olmedo PT, DPT  3/15/2018  [phone]

## 2018-03-15 NOTE — PT/OT/SLP EVAL
"Occupational Therapy   Evaluation and Discharge Note    Name: Yogesh Shay  MRN: [id number]  Admitting Diagnosis:  Recurrent left inguinal hernia 1 Day Post-Op    Recommendations:     Discharge Recommendations: home (no OT needs)  Discharge Equipment Recommendations:  none  Barriers to discharge:  None    History:     Occupational Profile:  Living Environment: Pt lives with wife in 2SH with 0STE. Pt bed/bath located on 1st floor. Home has tub/shower and walk-in available with grab barsfor use. Bathroom has raised toilet  Previous level of function: PTA, pt reports independence with all ADL and IADL, including driving. Pt was using walk-in shower. SC occasionally used for ambulation. Pt reports frequent LOB forward, especially when stooped over doing yard/farm work; most recent fall was Dec 2017 but no recent falls have resulted in hospitalization. Pt reports he is able to change the oil on his car, complete farm work, mow the lawn independently.  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 If needing to walk up stairs, pt reports using all four extremities to climb up "like a dog" to prevent falls  Equipment Owned:  cane, straight, bedside commode, walker, rolling (Pt only uses straight cane on occasion; all other AD is not used )  Assistance upon Discharge: Wife available to assist at home as needed.    Past Medical History:   Diagnosis Date    BPH (benign prostatic hypertrophy)     Cataract     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  Pain/Comfort:  · Pain Rating 1: 0/10  · Pain Addressed 1: Reposition, Distraction  · Pain Rating Post-Intervention 1: 0/10    Patients cultural, spiritual, Adventism conflicts given the current situation: none reported    Objective:     Patient found with: peripheral IV, SCD    General Precautions: Standard, fall   Orthopedic Precautions:N/A   Braces: N/A     Occupational Performance:    Bed Mobility:    · Patient completed Scooting/Bridging with modified independence  · Patient completed Supine to Sit with modified independence    Functional Mobility/Transfers:  · Patient completed Sit <> Stand Transfer with stand by assistance  with  no assistive device   · Patient completed Bed <> Chair Transfer using Step Transfer technique with stand by assistance with quad cane  · Functional Mobility: Pt ambulate 400' with SBA. Pt initially using RW 2/2 reports of balance deficits, PT attempt ambulation without RW but caused increased unsteadiness (no LOB). Pt given quad cane for final 200' of ambulation; he states he likes his straight cane better. Pt went up/down 2 stairs with PT assist; verbal cues required for hand/foot placement and safe QC use.    Activities of Daily Living:  · LB Dressing: independence to manage B socks while seated EOB    Cognitive/Visual Perceptual:  Cognitive/Psychosocial Skills:     -       Oriented to: Person, Place, Time and Situation   -       Follows Commands/attention:Follows multistep  commands  -       Communication: clear/fluent  -       Memory: No Deficits noted  -       Safety awareness/insight to disability: impaired, pt is impulsive  -       Mood/Affect/Coping skills/emotional control:  "Appropriate to situation and Happy  Visual/Perceptual:      -Intact    Physical Exam:  Balance:    -       fair sitting balance; fair static/dynamic standing balance; delayed reaction during balance testing  Postural examination/scapula alignment:    -       Rounded shoulders  -       Forward head  -       Kyphosis  Skin integrity: Visible skin intact  Edema:  None noted  Sensation:    -       Intact  Dominant hand:    -       R hand  Upper Extremity Range of Motion:     -       Right Upper Extremity: shoulder AROM to 90, full elbow ROM  -       Left Upper Extremity: shoulder AROM to 90, full elbow ROM  Upper Extremity Strength:    -       Right Upper Extremity: WFL  -       Left Upper Extremity: WFL   Strength:    -       Right Upper Extremity: WFL  -       Left Upper Extremity: WFL  Fine Motor Coordination:    -       Intact  Gross motor coordination:   WFL    Patient left up in chair with all lines intact, call button in reach and wife present    [facility] 6 Click:  [facility] Total Score: 23    Treatment & Education:  *Pt educated on role of OT/POC  *Pt educated on use of AD at home to increase safety due to balance deficits; OT recommend sitting in shower (pt report built in seat), use of reacher, use of stool when working in fields to avoid bending. OT/PT suggest use of reacher to prevent bending over and more frequent use of SC for increased safety. PT recommending HHPT for home adaptations/safety strategies for improved balance, especially when outdoors  *White board/communication board updated  Education:    Assessment:     Yogesh Shay is a 83 y.o. male with a medical diagnosis of Recurrent left inguinal hernia. At this time, patient is functioning at their prior level of function and does not require further acute OT services.     Clinical Decision Makin.  OT Low:  "Pt evaluation falls under low complexity for evaluation coding due to performance deficits noted in 1-3 areas as stated above and " "0 co-morbities affecting current functional status. Data obtained from problem focused assessments. No modifications or assistance was required for completion of evaluation. Only brief occupational profile and history review completed."     Plan:     During this hospitalization, patient does not require further acute OT services.  Please re-consult if situation changes.    · Plan of Care Reviewed with: patient, spouse    This Plan of care has been discussed with the patient who was involved in its development and understands and is in agreement with the identified goals and treatment plan    GOALS:    Occupational Therapy Goals     Not on file          Multidisciplinary Problems (Resolved)        Problem: Occupational Therapy Goal    Goal Priority Disciplines Outcome Interventions   Occupational Therapy Goal   (Resolved)     OT, PT/OT Outcome(s) achieved                    Time Tracking:     OT Date of Treatment: 03/15/18  OT Start Time: 0900  OT Stop Time: 0941  OT Total Time (min): 41 min    Billable Minutes:Evaluation 25  Therapeutic Activity 16    Marilee Wheatley OT  3/15/2018    "

## 2018-03-15 NOTE — ASSESSMENT & PLAN NOTE
82 y/o male with recurrent left inguinal hernia s/p open left inguinal hernia repair with mesh 1 Day Post-Op     - Diet  - Discontinue mIVF  - SLIV  - Encourage OOB, ambulation in halls  - Lifting precautions  - Plan for discharge to home today

## 2018-03-15 NOTE — SUBJECTIVE & OBJECTIVE
Interval History:   No acute events overnight. Afebrile. VSS. Pain minimal overnight. Controlled with current regimen. Voided x 3 overnight.      Medications:  Continuous Infusions:   sodium chloride 0.9%       Scheduled Meds:   albuterol-ipratropium 2.5mg-0.5mg/3mL  3 mL Nebulization Q6H WAKE    ampicillin-sulbactim (UNASYN) IVPB  3 g Intravenous Q6H    aspirin  81 mg Oral Daily    atenolol  75 mg Oral Daily    gabapentin  300 mg Oral QHS    heparin (porcine)  5,000 Units Subcutaneous Q8H    hydroCHLOROthiazide  12.5 mg Oral Daily    pantoprazole  40 mg Oral Daily    pravastatin  10 mg Oral Daily    tamsulosin  1 capsule Oral Daily     PRN Meds:acetaminophen, diphenhydrAMINE, hydrocodone-acetaminophen 10-325mg, hydrocodone-acetaminophen 5-325mg, ondansetron, promethazine (PHENERGAN) IVPB, ramelteon     Review of patient's allergies indicates:   Allergen Reactions    Sulfa (sulfonamide antibiotics) Swelling     Lips and face     Objective:     Vital Signs (Most Recent):  Temp: 96.8 °F (36 °C) (03/15/18 0723)  Pulse: 88 (03/15/18 0723)  Resp: 20 (03/15/18 0723)  BP: (!) 100/55 (03/15/18 0723)  SpO2: 95 % (03/15/18 0723) Vital Signs (24h Range):  Temp:  [96.2 °F (35.7 °C)-98 °F (36.7 °C)] 96.8 °F (36 °C)  Pulse:  [66-95] 88  Resp:  [14-25] 20  SpO2:  [90 %-99 %] 95 %  BP: ()/(50-99) 100/55     Weight: 68 kg (149 lb 14.6 oz)  Body mass index is 23.48 kg/m².    Intake/Output - Last 3 Shifts       03/13 0700 - 03/14 0659 03/14 0700 - 03/15 0659 03/15 0700 - 03/16 0659    P.O.  1220     I.V. (mL/kg)  1000 (14.7)     Total Intake(mL/kg)  2220 (32.6)     Urine (mL/kg/hr)  600     Total Output   600      Net   +1620                   Physical Exam   Constitutional: He is oriented to person, place, and time. He appears well-developed and well-nourished. No distress.   Elderly   HENT:   Head: Normocephalic and atraumatic.   Eyes: EOM are normal.   Neck: Neck supple. No JVD present.   Cardiovascular: Normal  rate, regular rhythm and intact distal pulses.    Pulmonary/Chest: Effort normal. No respiratory distress.   Abdominal: Soft. He exhibits no distension. There is no tenderness.       Musculoskeletal: He exhibits no edema or deformity.   Neurological: He is alert and oriented to person, place, and time.   Skin: Skin is warm and dry. No rash noted. He is not diaphoretic. No erythema.   Psychiatric: He has a normal mood and affect. His behavior is normal.   Nursing note and vitals reviewed.      Significant Labs:  None    Significant Diagnostics:  None

## 2018-03-15 NOTE — PLAN OF CARE
Problem: Occupational Therapy Goal  Goal: Occupational Therapy Goal  Outcome: Outcome(s) achieved Date Met: 03/15/18  Eval completed; no acute OT needs, pt expected to d/c this date

## 2018-03-15 NOTE — DISCHARGE SUMMARY
[facility]  DISCHARGE SUMMARY  General Surgery      Admit Date:  3/14/2018    Discharge Date and Time:  3/15/2018  12:00 PM    Attending Physician:  Jose Zhong MD     Discharge Provider:  Kyara Vallejo MD     Reason for Admission:  Recurrent left inguinal hernia     Procedures Performed:  Procedure(s) (LRB):  REPAIR-HERNIA-INGUINAL-OPEN-LEFT w/ mesh (Left)    Hospital Course:  Please see the preoperative H&P and other available documentation for full details related to history prior to this admission.  Briefly, Yogesh Shay is a 83 y.o. male who was admitted following scheduled elective surgery for Recurrent left inguinal hernia    Following a complete preoperative discussion of the risks and benefits of surgery with signed informed consent, the patient was taken to the operating room on 3/14/2018 and underwent the above stated procedures.  The patient tolerated surgery well and there were no complications.  Please see the operative report for full intraoperative findings and details.  Postoperatively, the patient did well and was transferred from the PACU to the floor in stable condition where they had a stable and uncomplicated hospital course.  Labs and vital signs remained stable and appropriate throughout course.  Diet was advanced as tolerated and the patient's pain was controlled on oral pain medications without problem.  Currently, the patient is doing well at 1 Day Post-Op and is stable and appropriate for discharge home at this time.      Consults:  None.    Significant Diagnostic Studies:   No results for input(s): WBC, HGB, HCT, PLT, BAND, METAMYELOCYT, MYELOPCT, HGBA1C in the last 72 hours.No results for input(s): NA, K, CL, CO2, BUN, CREATININE, GLU, CALCIUM, CAION, MG, PHOS, AST, ALT, ALKPHOS, BILITOT, BILIDIR, PROT, ALBUMIN, PREALBUMIN, AMYLASE, LIPASE, CRP, HSCRP, SEDRATE, PROCAL in the last 72 hours.No results for input(s): INR, PTT, LABHEPA, LACTATE,  TROPONINI, CPK, CPKMB, MB, BNP in the last 72 hours.No results for input(s): PH, PCO2, PO2, HCO3 in the last 72 hours.      Final Diagnoses:   Principal Problem:  Recurrent left inguinal hernia   Secondary Diagnoses:    Active Hospital Problems    Diagnosis  POA    *Recurrent left inguinal hernia [K40.91]  Yes      Resolved Hospital Problems    Diagnosis Date Resolved POA   No resolved problems to display.       Discharged Condition:  Good    Disposition:  Home or Self Care    Follow Up/Patient Instructions:     Medications:  Reconciled Home Medications:  Current Discharge Medication List      START taking these medications    Details   ondansetron (ZOFRAN-ODT) 8 MG TbDL Take 1 tablet (8 mg total) by mouth every 8 (eight) hours as needed (nausea).  Qty: 30 tablet, Refills: 1      polyethylene glycol (GLYCOLAX) 17 gram/dose powder Take 17 g by mouth once daily.  Qty: 510 g, Refills: 3         CONTINUE these medications which have CHANGED    Details   hydrocodone-acetaminophen 5-325mg (NORCO) 5-325 mg per tablet Take 1 tablet by mouth every 6 (six) hours as needed for Pain.  Qty: 50 tablet, Refills: 0         CONTINUE these medications which have NOT CHANGED    Details   atenolol (TENORMIN) 50 MG tablet TAKE ONE AND ONE-HALF TABLETS TWICE A DAY  Qty: 270 tablet, Refills: 2      diclofenac (CATAFLAM) 50 MG tablet TAKE 1 TABLET THREE TIMES A DAY  Qty: 270 tablet, Refills: 3      lisinopril 10 MG tablet Take 1 tablet (10 mg total) by mouth once daily.  Qty: 90 tablet, Refills: 3    Associated Diagnoses: Essential hypertension; Hypertensive urgency      metformin (GLUCOPHAGE) 500 MG tablet TAKE 1 TABLET TWICE A DAY WITH MEALS  Qty: 180 tablet, Refills: 3      pravastatin (PRAVACHOL) 10 MG tablet TAKE 1 TABLET DAILY  Qty: 90 tablet, Refills: 3      aspirin 81 mg Tab Take by mouth every evening. 1 Tablet Oral Every day      gabapentin (NEURONTIN) 300 MG capsule TAKE 1 CAPSULE EVERY EVENING AT BEDTIME  Qty: 90 capsule,  Refills: 3      hydroCHLOROthiazide (MICROZIDE) 12.5 mg capsule Take 1 capsule (12.5 mg total) by mouth once daily.  Qty: 90 capsule, Refills: 3      omeprazole (PRILOSEC) 20 MG capsule Take 1 capsule (20 mg total) by mouth once daily.  Qty: 90 capsule, Refills: 3    Associated Diagnoses: GERD (gastroesophageal reflux disease)      tamsulosin (FLOMAX) 0.4 mg Cp24 TAKE 1 CAPSULE DAILY  Qty: 90 capsule, Refills: 2             Discharge Procedure Orders  Diet diabetic     Activity as tolerated     Shower on day dressing removed (No bath)   Order Comments: May remove clear bandage and non-stick gauze in 24 hours. White tape strips to remain in place. These will fall off on their own. If not, we will remove them in clinic at your follow up visit in 2 weeks. After 24 hours, no dressing required. May shower with soap and water. Dab dry. Do not scrub vigorously. Do not submerge incisions for 2 weeks.     Lifting restrictions   Order Comments: No lifting greater than 10 pounds for 6 weeks.     Notify your health care provider if you experience any of the following:  increased confusion or weakness     Notify your health care provider if you experience any of the following:  persistent dizziness, light-headedness, or visual disturbances     Notify your health care provider if you experience any of the following:  worsening rash     Notify your health care provider if you experience any of the following:  severe persistent headache     Notify your health care provider if you experience any of the following:  difficulty breathing or increased cough     Notify your health care provider if you experience any of the following:  redness, tenderness, or signs of infection (pain, swelling, redness, odor or green/yellow discharge around incision site)     Notify your health care provider if you experience any of the following:  severe uncontrolled pain     Notify your health care provider if you experience any of the following:   persistent nausea and vomiting or diarrhea     Notify your health care provider if you experience any of the following:  temperature >100.4     Remove dressing in 24 hours       Follow-up Information     Jose Zhong MD In 2 weeks.    Specialties:  General Surgery, Bariatrics  Why:  Post-op inguinal hernia/Appt: 3/29/18 3:15 PM  Contact information:  [address]  [facility] [id number]  [phone]                   Kyara Vallejo MD

## 2018-03-15 NOTE — PT/OT/SLP EVAL
Physical Therapy Evaluation    Patient Name:  Yogesh Shay   MRN:  [id number]    Recommendations:     Discharge Recommendations:  home health PT   Discharge Equipment Recommendations: none   Barriers to discharge: Inaccessible home    Assessment:     Yogesh Shay is a 83 y.o. male admitted with a medical diagnosis of Recurrent left inguinal hernia.  He presents with the following impairments/functional limitations:  weakness, impaired endurance, gait instability, decreased safety awareness, decreased lower extremity function, impaired functional mobilty, impaired balance. Patient demonstrated good participation despite balance dysfunction. Patient is impulsive and makes poor safety decisions despite lengthy education. Level of assistance required SBA. Able to tolerate gait training with different AD. Recommending HHPT. Skilled physical therapy is medically necessary to address the above impairments in order to return the patient back to their previous level of function.       Rehab Prognosis:  good; patient would benefit from acute skilled PT services to address these deficits and reach maximum level of function.      Recent Surgery: Procedure(s) (LRB):  REPAIR-HERNIA-INGUINAL-OPEN-LEFT w/ mesh (Left) 1 Day Post-Op    Plan:     During this hospitalization, patient to be seen 4 x/week to address the above listed problems via gait training, therapeutic activities, therapeutic exercises, neuromuscular re-education  · Plan of Care Expires:  04/14/18   Plan of Care Reviewed with: patient, spouse    Subjective     Communicated with nurse Coon prior to session.  Patient found with HOB elevated upon PT entry to room, agreeable to evaluation.      Chief Complaint: loss of balance at home   Patient comments/goals: to return to PLOF  Pain/Comfort:  · Pain Rating 1: 0/10  · Pain Addressed 1: Reposition, Distraction, Cessation of Activity    Patients cultural, spiritual, Episcopal conflicts given the current  situation: none stated    Living Environment:  Patient lives with wife in a 2SH with no steps to enter. 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 He owns a walk in shower. His bedroom and bathroom are on the 1st floor.   Prior to admission, patients level of function was independent with all activities, still working, driving, and a community ambulator.  Patient has the following equipment: walker, rolling, cane, straight, raised toilet (walking stick).  DME owned (not currently used): none.  Upon discharge, patient will have assistance from wife.    Objective:     Patient found with: peripheral IV, SCD     General Precautions: Standard, fall   Orthopedic Precautions:N/A   Braces: N/A     Exams:  · Cognitive Exam:  Patient is oriented to Person, Place, Time and Situation and follows 100% of single step commands   · Postural Exam:  Patient presented with the following abnormalities:    · -       Rounded shoulders  · -       Forward head  · -       Abnormal trunk flexion  · Sensation:    · -       Intact  · Skin Integrity/Edema:      · -       Skin integrity: Visible skin intact  · -       Edema: None noted BLE/BUE  · RUE ROM: WFL  · RUE Strength: WFL  · LUE ROM: WFL  · LUE Strength: WFL  · RLE ROM: WFL  · RLE Strength: WFL  · LLE ROM: WFL  · LLE Strength: WFL   · Balance: fair in sitting and standing, delayed response to perturbation   · Rhomberg: negative eyes open, positive eyes closed    Functional Mobility:  · Bed Mobility:   Supine to Sit: Modified [place]   Scooting at EOB: Modified [place]    · Sitting Balance at Edge of Bed:   Assistance Level Required: Modified [place]   Time: 10 minutes during functional testing   Postural deviations noted:    -       Rounded shoulders  -       Forward head  -       Abnormal trunk flexion   Encouraged: sitting upright, patient stated he was unable to sit up straight due to a PMHx of back surgery    · Transfers:   Sit to Stand:  Stand-by Assistance with No Assistive Device x 2 trials   Stand to Sit: Stand-by Assistance with No Assistive Device x 2 trials   Bed to Chair: Stand Pivot with Stand-by Assistance with Quad Cane x 1 trial    · Gait: utilized verbal and tactile cues for decreasing gait speed due to safety concerns    Distance Ambulated: Pt ambulated x400 feet in hallway. Pt demo poor balance reaction to perturbations and disturbances, and req VC/TCs for AD use.   Assistance level: Stand-by Assistance   Assistive Device used:  Rolling Walker and Quad Cane   Gait Pattern: 3-point gait   Gait Deviation(s): increased gait speed (unsafe), increased lateral deviations   Impairments due to: balance dysfunction and weakness      AM-PAC 6 CLICK MOBILITY  Total Score:20       Therapeutic Activities and Exercises:  PT arrived to patient's room to find patient resting quietly; agreeable to PT session. Patient performed mobility as above with non-skid socks in place.    BP tested in sittin/78 mmHg, BP tested in standin/77 mmHg  · Patient/wife Education:   · Extensive education on safe ambulation with different AD on different surfaces  · Safety at work with bending, reaching, and lifting  · AD education for reaching and sitting during work (use of reacher and stool)  · Balance dysfunction during gait training  · PT d/c rec to HHPT  · PT POC   · EOB activity:  · Functional testing   Bedside table in front of patient and area set up for function, convenience, and safety. RN aware of patient's mobility needs and status. Questions/concerns addressed within PT scope of practice; patient and family with no further questions.       Patient left up in chair with all lines intact, call button in reach and wife present.    GOALS:    Physical Therapy Goals        Problem: Physical Therapy Goal    Goal Priority Disciplines Outcome Goal Variances Interventions   Physical Therapy Goal     PT/OT, PT Ongoing (interventions implemented as  appropriate)     Description:  Goals to be met by: 3/25/18    Patient will increase functional independence with mobility by performin. Gait  X >500 feet with Supervision using a SPC or quad cane.   2. Lower extremity exercise program x20 reps per handout, with supervision                      History:     Past Medical History:   Diagnosis Date    BPH (benign prostatic hypertrophy)     [facility]aract     [facility]    Chronic LBP 2012    Diabetes mellitus     DJD (degenerative joint disease) of lumbar spine 2012    Encounter for blood transfusion     Hyperlipidemia     Hypertension     Left lumbar radiculopathy 2012    Osteoarthritis 2012    Primary osteoarthritis of both knees 2012       Past Surgical History:   Procedure Laterality Date    APPENDECTOMY      CHOLECYSTECTOMY      COLON SURGERY      diverticulitis    COLONOSCOPY      EYE SURGERY      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, lower eyelid; od    HERNIA REPAIR      LUMBAR FUSION  2007     L3-L4 Transforaminal Lumbar Interbody Fusion    TOTAL KNEE ARTHROPLASTY  2010    Left       Clinical Decision Making:     History  Co-morbidities and personal factors that may impact the plan of care Examination  Body Structures and Functions, activity limitations and participation restrictions that may impact the plan of care Clinical Presentation   Decision Making/ Complexity Score   Co-morbidities:   [] Time since onset of injury / illness / exacerbation  [] Status of current condition  [x]Patient's cognitive status and safety concerns    [] Multiple Medical Problems (see med hx)  Personal Factors:   [x] Patient's age  [] Prior Level of function   [x] Patient's home situation (environment and family support)  [] Patient's level of motivation  [x] Expected progression of patient      HISTORY:(criteria)    [] 89433 - no personal factors/history    [] 91451 - has 1-2 personal factor/comorbidity     [] 50905 - has >3 personal  factor/comorbidity     Body Regions:  [x] Objective examination findings  [] Head     []  Neck  [x] Trunk   [] Upper Extremity  [x] Lower Extremity    Body Systems:  [x] For communication ability, affect, cognition, language, and learning style: the assessment of the ability to make needs known, consciousness, orientation (person, place, and time), expected emotional /behavioral responses, and learning preferences (eg, learning barriers, education  needs)  [x] For the neuromuscular system: a general assessment of gross coordinated movement (eg, balance, gait, locomotion, transfers, and transitions) and motor function  (motor control and motor learning)  [x] For the musculoskeletal system: the assessment of gross symmetry, gross range of motion, gross strength, height, and weight  [] For the integumentary system: the assessment of pliability(texture), presence of scar formation, skin color, and skin integrity  [] For cardiovascular/pulmonary system: the assessment of heart rate, respiratory rate, blood pressure, and edema     Activity limitations:    [x] Patient's cognitive status and saf ety concerns          [] Status of current condition      [] Weight bearing restriction  [] Cardiopulmunary Restriction    Participation Restrictions:   [x] Goals and goal agreement with the patient     [] Rehab potential (prognosis) and probable outcome      Examination of Body System: (criteria)    [x] 97798 - addressing 1-2 elements    [] 14256 - addressing a total of 3 or more elements     [] 99388 -  Addressing a total of 4 or more elements         Clinical Presentation: (criteria)  Stable - 50279     On examination of body system using standardized tests and measures patient presents with 1-2 elements from any of the following: body structures and functions, activity limitations, and/or participation restrictions.  Leading to a clinical presentation that is considered stable and/or  uncomplicated                              Clinical Decision Making  (Eval Complexity):  Low- 84443     Time Tracking:     PT Received On: 03/15/18  PT Start Time: 0859     PT Stop Time: 0941  PT Total Time (min): 42 min     Billable Minutes: Evaluation 10 and Gait Training 32    Julianne Olmedo PT, DPT  3/15/2018  [phone]

## 2018-03-15 NOTE — PROGRESS NOTES
[facility]  General Surgery  Progress Note    Subjective:     History of Present Illness:  Patient is an 83-year-old male with recurrent left inguinal hernia who presented for planned procedure.    Post-Op Info:  Procedure(s) (LRB):  REPAIR-HERNIA-INGUINAL-OPEN-LEFT w/ mesh (Left)   1 Day Post-Op     Interval History:   No acute events overnight. Afebrile. VSS. Pain minimal overnight. Controlled with current regimen. Voided x 3 overnight.      Medications:  Continuous Infusions:   sodium chloride 0.9%       Scheduled Meds:   albuterol-ipratropium 2.5mg-0.5mg/3mL  3 mL Nebulization Q6H WAKE    ampicillin-sulbactim (UNASYN) IVPB  3 g Intravenous Q6H    aspirin  81 mg Oral Daily    atenolol  75 mg Oral Daily    gabapentin  300 mg Oral QHS    heparin (porcine)  5,000 Units Subcutaneous Q8H    hydroCHLOROthiazide  12.5 mg Oral Daily    pantoprazole  40 mg Oral Daily    pravastatin  10 mg Oral Daily    tamsulosin  1 capsule Oral Daily     PRN Meds:acetaminophen, diphenhydrAMINE, hydrocodone-acetaminophen 10-325mg, hydrocodone-acetaminophen 5-325mg, ondansetron, promethazine (PHENERGAN) IVPB, ramelteon     Review of patient's allergies indicates:   Allergen Reactions    Sulfa (sulfonamide antibiotics) Swelling     Lips and face     Objective:     Vital Signs (Most Recent):  Temp: 96.8 °F (36 °C) (03/15/18 0723)  Pulse: 88 (03/15/18 0723)  Resp: 20 (03/15/18 0723)  BP: (!) 100/55 (03/15/18 0723)  SpO2: 95 % (03/15/18 0723) Vital Signs (24h Range):  Temp:  [96.2 °F (35.7 °C)-98 °F (36.7 °C)] 96.8 °F (36 °C)  Pulse:  [66-95] 88  Resp:  [14-25] 20  SpO2:  [90 %-99 %] 95 %  BP: ()/(50-99) 100/55     Weight: 68 kg (149 lb 14.6 oz)  Body mass index is 23.48 kg/m².    Intake/Output - Last 3 Shifts       03/13 0700 - 03/14 0659 03/14 0700 - 03/15 0659 03/15 0700 - 03/16 0659    P.O.  1220     I.V. (mL/kg)  1000 (14.7)     Total Intake(mL/kg)  2220 (32.6)     Urine (mL/kg/hr)  600     Total  Output   600      Net   +1620                   Physical Exam   Constitutional: He is oriented to person, place, and time. He appears well-developed and well-nourished. No distress.   Elderly   HENT:   Head: Normocephalic and atraumatic.   Eyes: EOM are normal.   Neck: Neck supple. No JVD present.   Cardiovascular: Normal rate, regular rhythm and intact distal pulses.    Pulmonary/Chest: Effort normal. No respiratory distress.   Abdominal: Soft. He exhibits no distension. There is no tenderness.       Musculoskeletal: He exhibits no edema or deformity.   Neurological: He is alert and oriented to person, place, and time.   Skin: Skin is warm and dry. No rash noted. He is not diaphoretic. No erythema.   Psychiatric: He has a normal mood and affect. His behavior is normal.   Nursing note and vitals reviewed.      Significant Labs:  None    Significant Diagnostics:  None    Assessment/Plan:     * Recurrent left inguinal hernia    84 y/o male with recurrent left inguinal hernia s/p open left inguinal hernia repair with mesh 1 Day Post-Op     - Diet  - Discontinue mIVF  - SLIV  - Encourage OOB, ambulation in halls  - Lifting precautions  - Plan for discharge to home today        Ran Mcdermott MD  Surgery Resident, PGY-III  Pager: [phone]  3/15/2018 7:34 AM

## 2018-03-15 NOTE — HPI
Patient is an 83-year-old male with recurrent left inguinal hernia who presented for planned procedure.

## 2018-03-15 NOTE — PROGRESS NOTES
Spoke to Dr. Coelho about pts BP trending down into the low 90's, was told to monitor and call him back if pts systolic goes into the 80's. Will continue to monitor.

## 2018-03-18 DIAGNOSIS — K21.9 GERD (GASTROESOPHAGEAL REFLUX DISEASE): ICD-10-CM

## 2018-03-19 RX ORDER — OMEPRAZOLE 20 MG/1
CAPSULE, DELAYED RELEASE ORAL
Qty: 90 CAPSULE | Refills: 3 | Status: SHIPPED | OUTPATIENT
Start: 2018-03-19 | End: 2019-03-13 | Stop reason: SDUPTHER

## 2018-03-19 NOTE — PT/OT/SLP DISCHARGE
Physical Therapy Discharge Summary    Name: Yogesh Shay  MRN: [id number]   Principal Problem: Recurrent left inguinal hernia     Patient Discharged from acute Physical Therapy on 3/15/18.  Please refer to prior PT noted date on 3/15/18 for functional status.     Assessment:     Patient was discharged unexpectedly.  Information required to complete an accurate discharge summary is unknown.  Refer to therapy initial evaluation and last progress note for initial and most recent functional status and goal achievement.  Recommendations made may be found in medical record.    Objective:     GOALS:    Physical Therapy Goals     Not on file          Multidisciplinary Problems (Resolved)        Problem: Physical Therapy Goal    Goal Priority Disciplines Outcome Goal Variances Interventions   Physical Therapy Goal   (Resolved)     PT/OT, PT Outcome(s) achieved     Description:  Goals to be met by: 3/25/18    Patient will increase functional independence with mobility by performin. Gait  X >500 feet with Supervision using a SPC or quad cane.   2. Lower extremity exercise program x20 reps per handout, with supervision                      Reasons for Discontinuation of Therapy Services  Transfer to alternate level of care.      Plan:     Patient Discharged to: Home no PT services needed.    Julianne Olmedo, PT  3/19/2018

## 2018-03-26 DIAGNOSIS — I10 ESSENTIAL HYPERTENSION: ICD-10-CM

## 2018-03-26 DIAGNOSIS — I16.0 HYPERTENSIVE URGENCY: ICD-10-CM

## 2018-03-26 NOTE — TELEPHONE ENCOUNTER
----- Message from Mansi Damon sent at 3/26/2018  2:38 PM CDT -----  Contact: patient- [phone]  Patient says [facility] never received Rx to be filled and need a refill on medication. lisinopril 10 MG tablet

## 2018-03-26 NOTE — TELEPHONE ENCOUNTER
----- Message from Mansi Damon sent at 3/26/2018  2:38 PM CDT -----  Contact: patient- [phone]  Patient says [facility] never received Rx to be filled and need a refill on medication. lisinopril 10 MG tablet

## 2018-03-27 RX ORDER — LISINOPRIL 10 MG/1
10 TABLET ORAL DAILY
Qty: 90 TABLET | Refills: 3 | Status: SHIPPED | OUTPATIENT
Start: 2018-03-27 | End: 2019-03-22 | Stop reason: SDUPTHER

## 2018-03-28 RX ORDER — METFORMIN HYDROCHLORIDE 500 MG/1
TABLET ORAL
Qty: 180 TABLET | Refills: 3 | Status: SHIPPED | OUTPATIENT
Start: 2018-03-28 | End: 2019-03-22 | Stop reason: SDUPTHER

## 2018-03-29 ENCOUNTER — OFFICE VISIT (OUTPATIENT)
Dept: SURGERY | Facility: CLINIC | Age: 83
End: 2018-03-29
Payer: MEDICARE

## 2018-03-29 VITALS
DIASTOLIC BLOOD PRESSURE: 93 MMHG | BODY MASS INDEX: 24.17 KG/M2 | SYSTOLIC BLOOD PRESSURE: 179 MMHG | HEART RATE: 69 BPM | TEMPERATURE: 99 F | WEIGHT: 154 LBS | HEIGHT: 67 IN

## 2018-03-29 DIAGNOSIS — Z09 POSTOP CHECK: Primary | ICD-10-CM

## 2018-03-29 PROBLEM — K40.91 RECURRENT LEFT INGUINAL HERNIA: Status: RESOLVED | Noted: 2018-03-14 | Resolved: 2018-03-29

## 2018-03-29 PROBLEM — K40.90 LEFT INGUINAL HERNIA: Status: RESOLVED | Noted: 2018-02-15 | Resolved: 2018-03-29

## 2018-03-29 PROCEDURE — 99999 PR PBB SHADOW E&M-EST. PATIENT-LVL III: CPT | Mod: PBBFAC,,, | Performed by: SURGERY

## 2018-03-29 PROCEDURE — 99024 POSTOP FOLLOW-UP VISIT: CPT | Mod: POP,,, | Performed by: SURGERY

## 2018-03-29 PROCEDURE — 99213 OFFICE O/P EST LOW 20 MIN: CPT | Mod: PBBFAC | Performed by: SURGERY

## 2018-03-29 RX ORDER — HYDROCHLOROTHIAZIDE 12.5 MG/1
CAPSULE ORAL
COMMUNITY
Start: 2018-01-31 | End: 2018-06-15 | Stop reason: SDUPTHER

## 2018-03-29 RX ORDER — HYDROCHLOROTHIAZIDE 25 MG/1
25 TABLET ORAL DAILY
COMMUNITY
Start: 2018-03-13 | End: 2018-06-15 | Stop reason: SDUPTHER

## 2018-03-29 NOTE — PROGRESS NOTES
"Yogesh Shay is a 83 y.o. male patient.   1. Postop check      Past Medical History:   Diagnosis Date    BPH (benign prostatic hypertrophy)     [facility]     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    Blood pressure (!) 179/93, pulse 69, temperature 98.6 °F (37 °C), height 5' 7" (1.702 m), weight 69.9 kg (154 lb).    Subjective S/p open lih 3/14/18.  He has no complaints regarding the surgery but has some back pain.  Objective Abdomen benign, wound clear.   Assessment & Plan Doing well after open lih hernia repair.  Light duty one more month and follow up prn.       Jose Zhong MD  3/29/2018  "

## 2018-03-29 NOTE — LETTER
March 29, 2018        Aubrey Root MD  [address]  [place] [address]             [facility] - General Surgery  [address]  [facility] [address]  Phone: [phone]   Patient: Yogesh Shay   MR Number: [id number]   YOB: 1934   Date of Visit: 3/29/2018       Dear Dr. Root:    Thank you for referring Yogesh Shay to me for evaluation. Below are the relevant portions of my assessment and plan of care.     Assessment & Plan Doing well after hernia repair.  Light duty one more month and follow up prn.    Doing well after open lih hernia repair.  Light duty one more month and follow up prn.    If you have questions, please do not hesitate to call me. I look forward to following Yogesh along with you.    Sincerely,      Jose Zhong MD           CC  No Recipients

## 2018-04-03 RX ORDER — ATENOLOL 50 MG/1
TABLET ORAL
Qty: 270 TABLET | Refills: 2 | Status: SHIPPED | OUTPATIENT
Start: 2018-04-03 | End: 2018-12-28 | Stop reason: SDUPTHER

## 2018-04-03 RX ORDER — TAMSULOSIN HYDROCHLORIDE 0.4 MG/1
CAPSULE ORAL
Qty: 90 CAPSULE | Refills: 2 | Status: SHIPPED | OUTPATIENT
Start: 2018-04-03 | End: 2018-12-28 | Stop reason: SDUPTHER

## 2018-04-26 ENCOUNTER — OFFICE VISIT (OUTPATIENT)
Dept: OPHTHALMOLOGY | Facility: CLINIC | Age: 83
End: 2018-04-26
Payer: MEDICARE

## 2018-04-26 VITALS — HEART RATE: 71 BPM | SYSTOLIC BLOOD PRESSURE: 157 MMHG | DIASTOLIC BLOOD PRESSURE: 87 MMHG

## 2018-04-26 DIAGNOSIS — E11.9 TYPE 2 DIABETES MELLITUS WITHOUT OPHTHALMIC MANIFESTATIONS: ICD-10-CM

## 2018-04-26 DIAGNOSIS — H25.13 NUCLEAR SENILE CATARACT OF BOTH EYES: ICD-10-CM

## 2018-04-26 DIAGNOSIS — H40.033 ANATOMICAL NARROW ANGLE BORDERLINE GLAUCOMA OF BOTH EYES: Primary | ICD-10-CM

## 2018-04-26 PROCEDURE — 66761 REVISION OF IRIS: CPT | Mod: PBBFAC,RT | Performed by: OPHTHALMOLOGY

## 2018-04-26 PROCEDURE — 99999 PR PBB SHADOW E&M-EST. PATIENT-LVL II: CPT | Mod: PBBFAC,,, | Performed by: OPHTHALMOLOGY

## 2018-04-26 PROCEDURE — 99499 UNLISTED E&M SERVICE: CPT | Mod: S$PBB,,, | Performed by: OPHTHALMOLOGY

## 2018-04-26 PROCEDURE — 99212 OFFICE O/P EST SF 10 MIN: CPT | Mod: PBBFAC | Performed by: OPHTHALMOLOGY

## 2018-04-26 NOTE — PROGRESS NOTES
HPI     DLS:03/05/2018    Pt here for Argon/ YAG PI OD    Meds: No GTTS    1. Anatomical narrow angle  2. Glaucoma suspect of both eyes  3. Nuclear sclerosis of both eyes  4. Type 2 diabetes mellitus without retinopathy       Last edited by Baylee Calles on 4/26/2018 10:41 AM. (History)            Assessment /Plan     For exam results, see Encounter Report.    Anatomical narrow angle borderline glaucoma of both eyes    Nuclear senile cataract of both eyes    Type 2 diabetes mellitus without ophthalmic manifestations      RETIRED FROM THE [employer] // STILL HAS A [place] THAT HE FARMS   VERY ACTIVE STILL     1. Narrow angles // glaucoma suspect    rec laser PI OD then os    Argon then yag    Followed by repeat gonio and dilated exam    May or may not have residual glaucoma after PI's    2. NS ou    Pt does not want cataract surgery at this time    Has other health issues     3. Diabetes    Needs dilated exam once the PI's are done     4. H/O tumor / cancer RLL    S/P removal and re-construction at [facility]    The biopsy was done by amanda - but the surgery was done by some one else     5. Has some abdominal issues    Having surgery next week - 3/15/2018     PLAN    Warned of sighs of angle closure    Schedule PI OS  then OD  - argon then yag     PI done OD 4/26/2018 - pred forte taper   PI OS - pending     Once laser PI's done still need DFE and evaluation for catracts and posssible residual glaucoma   May or may not need glaucoma gtts

## 2018-04-30 RX ORDER — HYDROCODONE BITARTRATE AND ACETAMINOPHEN 5; 325 MG/1; MG/1
1 TABLET ORAL EVERY 6 HOURS PRN
Qty: 50 TABLET | Refills: 0 | Status: SHIPPED | OUTPATIENT
Start: 2018-04-30 | End: 2018-05-31 | Stop reason: SDUPTHER

## 2018-04-30 NOTE — TELEPHONE ENCOUNTER
----- Message from Yvon Yeager sent at 4/30/2018  1:31 PM CDT -----  Contact: Lida/Wife/[phone]  Type: Rx    Name of medication(s): hydrocodone-acetaminophen 5-325mg (NORCO) 5-325 mg per tablet    Is this a refill? New rx? Refill     Who prescribed medication?      Pharmacy Name, Phone, & Location: [facility] - [place]  [address]    Comments: Pt's wife is requesting a refill on the medication above. Please advise.      Thanks

## 2018-05-10 ENCOUNTER — OFFICE VISIT (OUTPATIENT)
Dept: OPHTHALMOLOGY | Facility: CLINIC | Age: 83
End: 2018-05-10
Payer: MEDICARE

## 2018-05-10 VITALS — DIASTOLIC BLOOD PRESSURE: 96 MMHG | HEART RATE: 65 BPM | SYSTOLIC BLOOD PRESSURE: 181 MMHG

## 2018-05-10 DIAGNOSIS — H25.13 NUCLEAR SENILE CATARACT OF BOTH EYES: ICD-10-CM

## 2018-05-10 DIAGNOSIS — E11.9 TYPE 2 DIABETES MELLITUS WITHOUT OPHTHALMIC MANIFESTATIONS: ICD-10-CM

## 2018-05-10 DIAGNOSIS — H40.033 ANATOMICAL NARROW ANGLE BORDERLINE GLAUCOMA OF BOTH EYES: Primary | ICD-10-CM

## 2018-05-10 PROCEDURE — 66761 REVISION OF IRIS: CPT | Mod: PBBFAC,LT | Performed by: OPHTHALMOLOGY

## 2018-05-10 PROCEDURE — 99999 PR PBB SHADOW E&M-EST. PATIENT-LVL II: CPT | Mod: PBBFAC,,, | Performed by: OPHTHALMOLOGY

## 2018-05-10 PROCEDURE — 99499 UNLISTED E&M SERVICE: CPT | Mod: S$PBB,,, | Performed by: OPHTHALMOLOGY

## 2018-05-10 PROCEDURE — 99212 OFFICE O/P EST SF 10 MIN: CPT | Mod: PBBFAC | Performed by: OPHTHALMOLOGY

## 2018-05-10 NOTE — PROGRESS NOTES
HPI     Glaucoma    Additional comments: LPI OS           Comments   Her for Argon/Yag PI OS    S/p Argon/ YAG PI OD    Meds: No GTTS    1. Anatomical narrow angle  2. Glaucoma suspect of both eyes  3. Nuclear sclerosis of both eyes  4. Type 2 diabetes mellitus without retinopathy          Last edited by Joe Larios on 5/10/2018 10:02 AM. (History)            Assessment /Plan     For exam results, see Encounter Report.    Anatomical narrow angle borderline glaucoma of both eyes    Nuclear senile cataract of both eyes    Type 2 diabetes mellitus without ophthalmic manifestations      RETIRED FROM THE [employer] // STILL HAS A [place] THAT HE FARMS   VERY ACTIVE STILL     1. Narrow angles // glaucoma suspect    rec laser PI OD then os    Argon then yag    Followed by repeat gonio and dilated exam    May or may not have residual glaucoma after PI's    2. NS ou    Pt does not want cataract surgery at this time    Has other health issues     3. Diabetes    Needs dilated exam once the PI's are done     4. H/O tumor / cancer RLL    S/P removal and [facility]construction at [facility]    The biopsy was done by amanda - but the surgery was done by some one else     5. Has some abdominal issues    Having surgery next week - 3/15/2018     PLAN    Warned of sighs of angle closure    Schedule PI OS  then OD  - argon then yag     PI done OD 4/26/2018 -   PI OS - 5/10/2018 - steroid taper     F/U with DFE and ON exam and photos - if shows  any cupping will need HVF  And OCT and possibly start gtts   May need cataract surgery in future once possibility of residual glaucoma is evaluated     Once laser PI's done still need DFE and evaluation for catracts and posssible residual glaucoma   May or may not need glaucoma gtts

## 2018-05-31 RX ORDER — HYDROCODONE BITARTRATE AND ACETAMINOPHEN 5; 325 MG/1; MG/1
1 TABLET ORAL EVERY 6 HOURS PRN
Qty: 50 TABLET | Refills: 0 | Status: SHIPPED | OUTPATIENT
Start: 2018-05-31 | End: 2018-06-25 | Stop reason: SDUPTHER

## 2018-05-31 NOTE — TELEPHONE ENCOUNTER
----- Message from Christa Purcell sent at 5/31/2018 12:48 PM CDT -----  Contact: wife/yo/[phone]  Pt wife called in regards to getting a Rx refill for hydrocodone-acetaminophen 5-325mg (NORCO) 5-325 mg per tablet/      [facility][1] pharmacy  Please advise

## 2018-06-11 RX ORDER — DICLOFENAC POTASSIUM 50 MG/1
TABLET, FILM COATED ORAL
Qty: 270 TABLET | Refills: 3 | Status: SHIPPED | OUTPATIENT
Start: 2018-06-11

## 2018-06-11 RX ORDER — HYDROCHLOROTHIAZIDE 25 MG/1
TABLET ORAL
Qty: 90 TABLET | Refills: 3 | Status: SHIPPED | OUTPATIENT
Start: 2018-06-11 | End: 2018-09-18 | Stop reason: SDUPTHER

## 2018-06-11 RX ORDER — PRAVASTATIN SODIUM 10 MG/1
TABLET ORAL
Qty: 90 TABLET | Refills: 3 | Status: SHIPPED | OUTPATIENT
Start: 2018-06-11 | End: 2019-06-05 | Stop reason: SDUPTHER

## 2018-06-14 NOTE — PROGRESS NOTES
Assessment /Plan     For exam results, see Encounter Report.    Anatomical narrow angle borderline glaucoma of both eyes  -     Discontinue: latanoprost 0.005 % ophthalmic solution; Place 1 drop into both eyes every evening.  Dispense: 2.5 mL; Refill: 12  -     Color Fundus Photography - OU - Both Eyes  -     Posterior Segment OCT Optic Nerve- Both eyes; Future  -     Barrett Visual Field - OU - Extended - Both Eyes; Future  -     latanoprost 0.005 % ophthalmic solution; Place 1 drop into both eyes once daily.  Dispense: 2.5 mL; Refill: 12    Ocular hypertension, bilateral  -     Discontinue: latanoprost 0.005 % ophthalmic solution; Place 1 drop into both eyes every evening.  Dispense: 2.5 mL; Refill: 12  -     Color Fundus Photography - OU - Both Eyes  -     Posterior Segment OCT Optic Nerve- Both eyes; Future  -     Barrett Visual Field - OU - Extended - Both Eyes; Future  -     latanoprost 0.005 % ophthalmic solution; Place 1 drop into both eyes once daily.  Dispense: 2.5 mL; Refill: 12    Open angle with borderline findings and high glaucoma risk in both eyes  -     Discontinue: latanoprost 0.005 % ophthalmic solution; Place 1 drop into both eyes every evening.  Dispense: 2.5 mL; Refill: 12  -     Color Fundus Photography - OU - Both Eyes  -     Posterior Segment OCT Optic Nerve- Both eyes; Future  -     Barrett Visual Field - OU - Extended - Both Eyes; Future  -     latanoprost 0.005 % ophthalmic solution; Place 1 drop into both eyes once daily.  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 Narrow angles // glaucoma suspect    rec laser PI OD then os - done  4/26/2018 and 5/10/2018     Argon then yag    Followed by repeat gonio and dilated exam    May or may not have residual glaucoma after PI's      IOP up ou today 26/40 - angle is more open post PI's   Begin latanoprost ou     2. NS ou    Pt does not want cataract surgery at this time    Has other health issues     3. Diabetes    No BDR seen on exam 6/15/2018     4. H/O tumor / cancer RLL    S/P removal and re-construction at [facility]    The biopsy was done by amanda - but the surgery was done by some one else     5. Has some abdominal issues    Having surgery next week - 3/15/2018     PLAN    Warned of sighs of angle closure    Schedule PI OS  then OD  - argon then yag     PI done OD 4/26/2018 -   PI OS - 5/10/2018 -    Disc photos today   BEGIN LATANOPRSOT OU QHS - IOP UP OU - OS>OD    F/u with HVF // OCT // gonio - DO NOT DILATE - glaucoma work-up since the CDR is generous - 1 MONTH

## 2018-06-15 ENCOUNTER — OFFICE VISIT (OUTPATIENT)
Dept: OPHTHALMOLOGY | Facility: CLINIC | Age: 83
End: 2018-06-15
Payer: MEDICARE

## 2018-06-15 DIAGNOSIS — H40.053 OCULAR HYPERTENSION, BILATERAL: ICD-10-CM

## 2018-06-15 DIAGNOSIS — Z98.890 S/P LASER IRIDOTOMY: ICD-10-CM

## 2018-06-15 DIAGNOSIS — D23.10: ICD-10-CM

## 2018-06-15 DIAGNOSIS — H25.13 NUCLEAR SENILE CATARACT OF BOTH EYES: ICD-10-CM

## 2018-06-15 DIAGNOSIS — H40.033 ANATOMICAL NARROW ANGLE BORDERLINE GLAUCOMA OF BOTH EYES: Primary | ICD-10-CM

## 2018-06-15 DIAGNOSIS — H57.03 SMALL PUPILS: ICD-10-CM

## 2018-06-15 DIAGNOSIS — T44.6X5A TAMSULOSIN-ASSOCIATED INTRAOPERATIVE FLOPPY IRIS SYNDROME (IFIS): ICD-10-CM

## 2018-06-15 DIAGNOSIS — E11.9 TYPE 2 DIABETES MELLITUS WITHOUT OPHTHALMIC MANIFESTATIONS: ICD-10-CM

## 2018-06-15 DIAGNOSIS — H40.023 OPEN ANGLE WITH BORDERLINE FINDINGS AND HIGH GLAUCOMA RISK IN BOTH EYES: ICD-10-CM

## 2018-06-15 DIAGNOSIS — H21.81 TAMSULOSIN-ASSOCIATED INTRAOPERATIVE FLOPPY IRIS SYNDROME (IFIS): ICD-10-CM

## 2018-06-15 PROCEDURE — 99212 OFFICE O/P EST SF 10 MIN: CPT | Mod: PBBFAC,25 | Performed by: OPHTHALMOLOGY

## 2018-06-15 PROCEDURE — 99999 PR PBB SHADOW E&M-EST. PATIENT-LVL II: CPT | Mod: PBBFAC,,, | Performed by: OPHTHALMOLOGY

## 2018-06-15 PROCEDURE — 92014 COMPRE OPH EXAM EST PT 1/>: CPT | Mod: S$PBB,,, | Performed by: OPHTHALMOLOGY

## 2018-06-15 PROCEDURE — 92250 FUNDUS PHOTOGRAPHY W/I&R: CPT | Mod: PBBFAC | Performed by: OPHTHALMOLOGY

## 2018-06-15 RX ORDER — LATANOPROST 50 UG/ML
1 SOLUTION/ DROPS OPHTHALMIC DAILY
Qty: 2.5 ML | Refills: 12 | Status: SHIPPED | OUTPATIENT
Start: 2018-06-15 | End: 2019-08-16 | Stop reason: SDUPTHER

## 2018-06-15 RX ORDER — LATANOPROST 50 UG/ML
1 SOLUTION/ DROPS OPHTHALMIC NIGHTLY
Qty: 2.5 ML | Refills: 12 | Status: SHIPPED | OUTPATIENT
Start: 2018-06-15 | End: 2018-06-15 | Stop reason: SDUPTHER

## 2018-06-25 NOTE — TELEPHONE ENCOUNTER
----- Message from Donavan Hernandez sent at 6/25/2018 10:12 AM CDT -----  Contact: Spouse [phone]  RX request - refill or new RX.  Is this a refill or new RX:  Refill  RX name and strength: HYDROcodone-acetaminophen (NORCO) 5-325 mg per     Pharmacy name and phone #): [facility] [place] [facility] [address]  Comments:  Wife wants a 90 Day Supply for patient stating Rx count was changed from 60 to 50 tablets and not sure.    Please call an advise  Thank you

## 2018-06-26 RX ORDER — HYDROCODONE BITARTRATE AND ACETAMINOPHEN 5; 325 MG/1; MG/1
1 TABLET ORAL EVERY 6 HOURS PRN
Qty: 50 TABLET | Refills: 0 | Status: SHIPPED | OUTPATIENT
Start: 2018-06-26 | End: 2018-07-25 | Stop reason: SDUPTHER

## 2018-06-26 NOTE — TELEPHONE ENCOUNTER
I have reviewed the [place] Prescription Monitoring Program database, no other controlled substances from other providers over last 3 mo. Has been filled by Dr Root twice in last 3 mo. I will refill medication.

## 2018-07-25 DIAGNOSIS — M54.9 BACK PAIN, UNSPECIFIED BACK LOCATION, UNSPECIFIED BACK PAIN LATERALITY, UNSPECIFIED CHRONICITY: Primary | ICD-10-CM

## 2018-07-25 NOTE — TELEPHONE ENCOUNTER
----- Message from Jelani Antony sent at 7/25/2018 10:04 AM CDT -----  Contact: [phone]  Type: Rx    Name of medication(s):  HYDROcodone-acetaminophen (NORCO) 5-325 mg per tablet    Is this a refill? New rx? Refill      Who prescribed medication?    Pharmacy Name, Phone, & Location:[facility]     Comments: please advise, Thanks

## 2018-07-27 RX ORDER — HYDROCODONE BITARTRATE AND ACETAMINOPHEN 5; 325 MG/1; MG/1
1 TABLET ORAL EVERY 6 HOURS PRN
Qty: 50 TABLET | Refills: 0 | Status: SHIPPED | OUTPATIENT
Start: 2018-07-27 | End: 2018-08-22 | Stop reason: SDUPTHER

## 2018-08-09 ENCOUNTER — CLINICAL SUPPORT (OUTPATIENT)
Dept: OPHTHALMOLOGY | Facility: CLINIC | Age: 83
End: 2018-08-09
Payer: MEDICARE

## 2018-08-09 ENCOUNTER — OFFICE VISIT (OUTPATIENT)
Dept: OPHTHALMOLOGY | Facility: CLINIC | Age: 83
End: 2018-08-09
Payer: MEDICARE

## 2018-08-09 DIAGNOSIS — H02.102 ECTROPION OF RIGHT LOWER EYELID, UNSPECIFIED ECTROPION TYPE: ICD-10-CM

## 2018-08-09 DIAGNOSIS — D23.10: ICD-10-CM

## 2018-08-09 DIAGNOSIS — H40.023 OPEN ANGLE WITH BORDERLINE FINDINGS AND HIGH GLAUCOMA RISK IN BOTH EYES: ICD-10-CM

## 2018-08-09 DIAGNOSIS — H21.81 TAMSULOSIN-ASSOCIATED INTRAOPERATIVE FLOPPY IRIS SYNDROME (IFIS): ICD-10-CM

## 2018-08-09 DIAGNOSIS — T44.6X5A TAMSULOSIN-ASSOCIATED INTRAOPERATIVE FLOPPY IRIS SYNDROME (IFIS): ICD-10-CM

## 2018-08-09 DIAGNOSIS — H57.03 SMALL PUPILS: ICD-10-CM

## 2018-08-09 DIAGNOSIS — E11.9 TYPE 2 DIABETES MELLITUS WITHOUT OPHTHALMIC MANIFESTATIONS: ICD-10-CM

## 2018-08-09 DIAGNOSIS — H40.053 OCULAR HYPERTENSION, BILATERAL: ICD-10-CM

## 2018-08-09 DIAGNOSIS — Z98.890 S/P LASER IRIDOTOMY: ICD-10-CM

## 2018-08-09 DIAGNOSIS — H40.033 ANATOMICAL NARROW ANGLE OF BOTH EYES: ICD-10-CM

## 2018-08-09 DIAGNOSIS — H40.033 ANATOMICAL NARROW ANGLE BORDERLINE GLAUCOMA OF BOTH EYES: ICD-10-CM

## 2018-08-09 DIAGNOSIS — H25.13 NUCLEAR SENILE CATARACT OF BOTH EYES: ICD-10-CM

## 2018-08-09 DIAGNOSIS — H40.033 ANATOMICAL NARROW ANGLE BORDERLINE GLAUCOMA OF BOTH EYES: Primary | ICD-10-CM

## 2018-08-09 PROCEDURE — 99999 PR PBB SHADOW E&M-EST. PATIENT-LVL II: CPT | Mod: PBBFAC,,, | Performed by: OPHTHALMOLOGY

## 2018-08-09 PROCEDURE — 92083 EXTENDED VISUAL FIELD XM: CPT | Mod: 26,S$PBB,, | Performed by: OPHTHALMOLOGY

## 2018-08-09 PROCEDURE — 99212 OFFICE O/P EST SF 10 MIN: CPT | Mod: PBBFAC,25 | Performed by: OPHTHALMOLOGY

## 2018-08-09 PROCEDURE — 92133 CPTRZD OPH DX IMG PST SGM ON: CPT | Mod: 26,S$PBB,, | Performed by: OPHTHALMOLOGY

## 2018-08-09 PROCEDURE — 92012 INTRM OPH EXAM EST PATIENT: CPT | Mod: S$PBB,,, | Performed by: OPHTHALMOLOGY

## 2018-08-09 PROCEDURE — 92133 CPTRZD OPH DX IMG PST SGM ON: CPT | Mod: PBBFAC

## 2018-08-09 PROCEDURE — 92083 EXTENDED VISUAL FIELD XM: CPT | Mod: PBBFAC

## 2018-08-09 NOTE — PROGRESS NOTES
Assessment /Plan     For exam results, see Encounter Report.    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        Glaucoma meds    Latanoprost ou - started 6/15/2018         H/O adverse rxn to glaucoma drops    none        LASERS    Laser PI's 4/2018 - od // OS 5/2018         GLAUCOMA SURGERIES    none        OTHER EYE SURGERIES    none        CDR    0.7/0.4         Tbase    19-27  od // 22-40 os         Tmax     27/40          Ttarget    ?             HVF    1 test 2018 to  2018 - unreliable  od // unreliable  os        Gonio    +3 w/ steep approach - post PI's ou         CCT    518 / 528        OCT    1 test 2018 to 2018 - RNFL - bord T od // bord TS os        HRT    ? test 20? to 20? - MR - ? od // ? os        Disc photos    6/2018     - Ttoday    15 od / 17 os (on latanoprost - ( down from 26/40))  - Test done today    HVF - poor test taker - unreilable // OCT     2. NS ou    Pt does not want cataract surgery at this time    Has other health issues    ON FLOWMAX     3. Diabetes    No BDR seen on exam 6/15/2018     4. H/O tumor / cancer RLL    S/P removal and re-construction at [facility]    The biopsy was done by amanda - but the surgery was done by some one else     5. Has some abdominal  issues    Having surgery next week - 3/15/2018     PLAN      CSM   Good resp to latanoprost   F/U with HRT ou

## 2018-08-22 DIAGNOSIS — M54.9 BACK PAIN, UNSPECIFIED BACK LOCATION, UNSPECIFIED BACK PAIN LATERALITY, UNSPECIFIED CHRONICITY: ICD-10-CM

## 2018-08-22 RX ORDER — HYDROCODONE BITARTRATE AND ACETAMINOPHEN 5; 325 MG/1; MG/1
1 TABLET ORAL EVERY 6 HOURS PRN
Qty: 50 TABLET | Refills: 0 | Status: SHIPPED | OUTPATIENT
Start: 2018-08-22 | End: 2018-09-21 | Stop reason: SDUPTHER

## 2018-08-22 NOTE — TELEPHONE ENCOUNTER
----- Message from Donavan Hernandez sent at 8/22/2018  9:19 AM CDT -----  Contact: Zaida [phone]  RX request - refill or new RX.  Is this a refill or new RX:  Refill  RX name and strength: HYDROcodone-acetaminophen (NORCO) 5-325 mg per tablet  Pharmacy name and phone #  [facility] [place] [address] [facility]     Please call an advise  Thank you

## 2018-09-18 RX ORDER — HYDROCHLOROTHIAZIDE 25 MG/1
25 TABLET ORAL DAILY
Qty: 90 TABLET | Refills: 3 | Status: SHIPPED | OUTPATIENT
Start: 2018-09-18 | End: 2019-06-05 | Stop reason: SDUPTHER

## 2018-09-18 NOTE — TELEPHONE ENCOUNTER
----- Message from Christa Purcell sent at 9/18/2018 10:38 AM CDT -----  Contact: wife/yo/[phone]  Pt called in regards to getting a Rx refill for hydrochlorothiazide (HYDRODIURIL) 25 MG tablet 90 tablet 3 6/11/2018 No  TAKE 1 TABLET DAILY    [facility] [phone]  Please advise

## 2018-09-21 DIAGNOSIS — M54.9 BACK PAIN, UNSPECIFIED BACK LOCATION, UNSPECIFIED BACK PAIN LATERALITY, UNSPECIFIED CHRONICITY: ICD-10-CM

## 2018-09-21 NOTE — TELEPHONE ENCOUNTER
----- Message from Shanell Stark sent at 9/21/2018  3:16 PM CDT -----  Contact: Lida Patient [phone]  Type: Rx    Name of medication(s): HYDROcodone-acetaminophen (NORCO) 5-325 mg per tablet    Is this a refill? New rx? Refill    Who prescribed medication?    Pharmacy Name, Phone, & [place] [place]    Comments:Please refill        Thanks

## 2018-09-22 RX ORDER — HYDROCODONE BITARTRATE AND ACETAMINOPHEN 5; 325 MG/1; MG/1
1 TABLET ORAL EVERY 6 HOURS PRN
Qty: 50 TABLET | Refills: 0 | Status: SHIPPED | OUTPATIENT
Start: 2018-09-22 | End: 2018-09-28 | Stop reason: SDUPTHER

## 2018-09-24 ENCOUNTER — TELEPHONE (OUTPATIENT)
Dept: INTERNAL MEDICINE | Facility: CLINIC | Age: 83
End: 2018-09-24

## 2018-09-24 DIAGNOSIS — E11.42 DIABETIC POLYNEUROPATHY ASSOCIATED WITH TYPE 2 DIABETES MELLITUS: ICD-10-CM

## 2018-09-24 DIAGNOSIS — I10 ESSENTIAL HYPERTENSION: ICD-10-CM

## 2018-09-24 DIAGNOSIS — Z00.00 ANNUAL PHYSICAL EXAM: Primary | ICD-10-CM

## 2018-09-24 DIAGNOSIS — E78.5 HYPERLIPIDEMIA, UNSPECIFIED HYPERLIPIDEMIA TYPE: ICD-10-CM

## 2018-09-24 DIAGNOSIS — E11.42 CONTROLLED TYPE 2 DIABETES MELLITUS WITH DIABETIC POLYNEUROPATHY, WITHOUT LONG-TERM CURRENT USE OF INSULIN: ICD-10-CM

## 2018-09-24 NOTE — TELEPHONE ENCOUNTER
----- Message from Jelani Antony sent at 9/24/2018 11:34 AM CDT -----  Contact: self [phone]  Patient's wife requesting a call from the office to discuss patient lab order . Please call and advise.

## 2018-09-25 ENCOUNTER — LAB VISIT (OUTPATIENT)
Dept: LAB | Facility: HOSPITAL | Age: 83
End: 2018-09-25
Attending: NURSE PRACTITIONER
Payer: MEDICARE

## 2018-09-25 DIAGNOSIS — E78.5 HYPERLIPIDEMIA, UNSPECIFIED HYPERLIPIDEMIA TYPE: ICD-10-CM

## 2018-09-25 DIAGNOSIS — I10 ESSENTIAL HYPERTENSION: ICD-10-CM

## 2018-09-25 DIAGNOSIS — E11.42 CONTROLLED TYPE 2 DIABETES MELLITUS WITH DIABETIC POLYNEUROPATHY, WITHOUT LONG-TERM CURRENT USE OF INSULIN: ICD-10-CM

## 2018-09-25 DIAGNOSIS — E11.42 DIABETIC POLYNEUROPATHY ASSOCIATED WITH TYPE 2 DIABETES MELLITUS: ICD-10-CM

## 2018-09-25 LAB
ALBUMIN SERPL BCP-MCNC: 4.1 G/DL
ALP SERPL-CCNC: 55 U/L
ALT SERPL W/O P-5'-P-CCNC: 12 U/L
ANION GAP SERPL CALC-SCNC: 11 MMOL/L
AST SERPL-CCNC: 19 U/L
BASOPHILS # BLD AUTO: 0.04 K/UL
BASOPHILS NFR BLD: 0.6 %
BILIRUB SERPL-MCNC: 0.6 MG/DL
BUN SERPL-MCNC: 12 MG/DL
CALCIUM SERPL-MCNC: 9.9 MG/DL
CHLORIDE SERPL-SCNC: 98 MMOL/L
CHOLEST SERPL-MCNC: 151 MG/DL
CHOLEST/HDLC SERPL: 2.6 {RATIO}
CO2 SERPL-SCNC: 24 MMOL/L
CREAT SERPL-MCNC: 0.8 MG/DL
DIFFERENTIAL METHOD: ABNORMAL
EOSINOPHIL # BLD AUTO: 0.1 K/UL
EOSINOPHIL NFR BLD: 1.7 %
ERYTHROCYTE [DISTWIDTH] IN BLOOD BY AUTOMATED COUNT: 15 %
EST. GFR  (AFRICAN AMERICAN): >60 ML/MIN/1.73 M^2
EST. GFR  (NON AFRICAN AMERICAN): >60 ML/MIN/1.73 M^2
ESTIMATED AVG GLUCOSE: 108 MG/DL
GLUCOSE SERPL-MCNC: 109 MG/DL
HBA1C MFR BLD HPLC: 5.4 %
HCT VFR BLD AUTO: 37.5 %
HDLC SERPL-MCNC: 59 MG/DL
HDLC SERPL: 39.1 %
HGB BLD-MCNC: 12.6 G/DL
IMM GRANULOCYTES # BLD AUTO: 0.02 K/UL
IMM GRANULOCYTES NFR BLD AUTO: 0.3 %
LDLC SERPL CALC-MCNC: 67.6 MG/DL
LYMPHOCYTES # BLD AUTO: 1.9 K/UL
LYMPHOCYTES NFR BLD: 29.1 %
MCH RBC QN AUTO: 34.8 PG
MCHC RBC AUTO-ENTMCNC: 33.6 G/DL
MCV RBC AUTO: 104 FL
MONOCYTES # BLD AUTO: 0.5 K/UL
MONOCYTES NFR BLD: 7.3 %
NEUTROPHILS # BLD AUTO: 3.9 K/UL
NEUTROPHILS NFR BLD: 61 %
NONHDLC SERPL-MCNC: 92 MG/DL
NRBC BLD-RTO: 0 /100 WBC
PLATELET # BLD AUTO: 182 K/UL
PMV BLD AUTO: 9.6 FL
POTASSIUM SERPL-SCNC: 4.4 MMOL/L
PROT SERPL-MCNC: 7.4 G/DL
RBC # BLD AUTO: 3.62 M/UL
SODIUM SERPL-SCNC: 133 MMOL/L
TRIGL SERPL-MCNC: 122 MG/DL
WBC # BLD AUTO: 6.46 K/UL

## 2018-09-25 PROCEDURE — 36415 COLL VENOUS BLD VENIPUNCTURE: CPT | Mod: PO

## 2018-09-25 PROCEDURE — 80061 LIPID PANEL: CPT

## 2018-09-25 PROCEDURE — 83036 HEMOGLOBIN GLYCOSYLATED A1C: CPT

## 2018-09-25 PROCEDURE — 80053 COMPREHEN METABOLIC PANEL: CPT

## 2018-09-25 PROCEDURE — 85025 COMPLETE CBC W/AUTO DIFF WBC: CPT

## 2018-09-25 NOTE — PROGRESS NOTES
Will be addressed in 3 days by PCP Dr. Root. These orders were placed by me for the pt's appt in 3 days.

## 2018-09-26 ENCOUNTER — TELEPHONE (OUTPATIENT)
Dept: INTERNAL MEDICINE | Facility: CLINIC | Age: 83
End: 2018-09-26

## 2018-09-26 NOTE — TELEPHONE ENCOUNTER
----- Message from Aubrey Root MD sent at 9/26/2018  1:09 PM CDT -----  Please advise pt the labs were OK

## 2018-09-28 ENCOUNTER — IMMUNIZATION (OUTPATIENT)
Dept: INTERNAL MEDICINE | Facility: CLINIC | Age: 83
End: 2018-09-28
Payer: MEDICARE

## 2018-09-28 ENCOUNTER — OFFICE VISIT (OUTPATIENT)
Dept: INTERNAL MEDICINE | Facility: CLINIC | Age: 83
End: 2018-09-28
Payer: MEDICARE

## 2018-09-28 DIAGNOSIS — M54.12 CERVICAL RADICULITIS: ICD-10-CM

## 2018-09-28 DIAGNOSIS — D75.89 MACROCYTOSIS: ICD-10-CM

## 2018-09-28 DIAGNOSIS — D51.8 OTHER VITAMIN B12 DEFICIENCY ANEMIAS: ICD-10-CM

## 2018-09-28 DIAGNOSIS — R10.13 CONTINUOUS EPIGASTRIC PAIN: ICD-10-CM

## 2018-09-28 DIAGNOSIS — M54.9 BACK PAIN, UNSPECIFIED BACK LOCATION, UNSPECIFIED BACK PAIN LATERALITY, UNSPECIFIED CHRONICITY: ICD-10-CM

## 2018-09-28 DIAGNOSIS — I10 ESSENTIAL HYPERTENSION: Primary | ICD-10-CM

## 2018-09-28 DIAGNOSIS — E11.42 CONTROLLED TYPE 2 DIABETES MELLITUS WITH DIABETIC POLYNEUROPATHY, WITHOUT LONG-TERM CURRENT USE OF INSULIN: ICD-10-CM

## 2018-09-28 PROCEDURE — 99214 OFFICE O/P EST MOD 30 MIN: CPT | Mod: PBBFAC,25 | Performed by: INTERNAL MEDICINE

## 2018-09-28 PROCEDURE — 90662 IIV NO PRSV INCREASED AG IM: CPT | Mod: PBBFAC

## 2018-09-28 PROCEDURE — 99999 PR PBB SHADOW E&M-EST. PATIENT-LVL IV: CPT | Mod: PBBFAC,,, | Performed by: INTERNAL MEDICINE

## 2018-09-28 PROCEDURE — 99214 OFFICE O/P EST MOD 30 MIN: CPT | Mod: S$PBB,,, | Performed by: INTERNAL MEDICINE

## 2018-09-28 RX ORDER — ONDANSETRON 8 MG/1
8 TABLET, ORALLY DISINTEGRATING ORAL EVERY 8 HOURS PRN
Qty: 30 TABLET | Refills: 1 | Status: SHIPPED | OUTPATIENT
Start: 2018-09-28 | End: 2019-09-12 | Stop reason: SDUPTHER

## 2018-09-28 RX ORDER — HYDROCODONE BITARTRATE AND ACETAMINOPHEN 5; 325 MG/1; MG/1
1 TABLET ORAL EVERY 6 HOURS PRN
Qty: 60 TABLET | Refills: 0 | Status: SHIPPED | OUTPATIENT
Start: 2018-09-28 | End: 2018-11-19 | Stop reason: SDUPTHER

## 2018-09-28 RX ORDER — ACETAMINOPHEN 500 MG
500 TABLET ORAL EVERY 6 HOURS PRN
COMMUNITY

## 2018-10-01 VITALS
HEART RATE: 68 BPM | WEIGHT: 154.19 LBS | BODY MASS INDEX: 24.2 KG/M2 | HEIGHT: 67 IN | SYSTOLIC BLOOD PRESSURE: 134 MMHG | DIASTOLIC BLOOD PRESSURE: 76 MMHG

## 2018-10-01 NOTE — PROGRESS NOTES
Subjective:       Patient ID: Yogesh Shay is a 84 y.o. male.    Chief Complaint: Diabetes    Diabetes   He presents for his follow-up diabetic visit. He has type 2 diabetes mellitus. His disease course has been improving. There are no hypoglycemic associated symptoms. There are no diabetic associated symptoms. Pertinent negatives for diabetes include no chest pain. Symptoms are stable.   Hypertension   This is a chronic problem. The problem is unchanged. The problem is controlled. Associated symptoms include neck pain (pain in left arm-  radic by description). Pertinent negatives include no chest pain or shortness of breath.     Review of Systems   Respiratory: Negative for shortness of breath.    Cardiovascular: Negative for chest pain.   Gastrointestinal: Positive for abdominal pain and nausea. Negative for blood in stool.   Musculoskeletal: Positive for neck pain (pain in left arm-  radic by description).       Objective:      Physical Exam   Constitutional: He is oriented to person, place, and time. He appears well-developed and well-nourished. No distress.   HENT:   Head: Normocephalic and atraumatic.   Mouth/Throat: Oropharynx is clear and moist.   Eyes: Conjunctivae are normal. Pupils are equal, round, and reactive to light.   Neck: Normal range of motion. Neck supple.   Cardiovascular: Normal rate, regular rhythm and normal heart sounds.   Pulmonary/Chest: Effort normal and breath sounds normal. He has no wheezes.   Abdominal: Soft. Bowel sounds are normal. There is tenderness in the epigastric area.   Musculoskeletal: Normal range of motion. He exhibits no edema or tenderness.   Neurological: He is alert and oriented to person, place, and time. No cranial nerve deficit.   Skin: No erythema.   Psychiatric: He has a normal mood and affect.   Vitals reviewed.      Assessment:       1. Essential hypertension    2. Macrocytosis    3. Other vitamin B12 deficiency anemias     4. Continuous epigastric  pain    5. Cervical radiculitis    6. Back pain, unspecified back location, unspecified back pain laterality, unspecified chronicity    7. Controlled type 2 diabetes mellitus with diabetic polyneuropathy, without long-term current use of insulin        Plan:       Yogesh was seen today for diabetes.    Diagnoses and all orders for this visit:    Essential hypertension    Macrocytosis  -     Vitamin B12; Future    Other vitamin B12 deficiency anemias   -     Vitamin B12; Future    Continuous epigastric pain  -     Case request GI: ESOPHAGOGASTRODUODENOSCOPY (EGD)  -     ondansetron (ZOFRAN-ODT) 8 MG TbDL; Take 1 tablet (8 mg total) by mouth every 8 (eight) hours as needed (nausea).    Cervical radiculitis  -     Ambulatory consult to Physical Therapy  -     Ambulatory consult to Physical Therapy    Back pain, unspecified back location, unspecified back pain laterality, unspecified chronicity  -     HYDROcodone-acetaminophen (NORCO) 5-325 mg per tablet; Take 1 tablet by mouth every 6 (six) hours as needed for Pain.    Controlled type 2 diabetes mellitus with diabetic polyneuropathy, without long-term current use of insulin        Follow-up in about 9 months (around 6/28/2019) for F/U APPOINTMENT WITH ME, WITH LAB BEFORE.

## 2018-10-23 ENCOUNTER — TELEPHONE (OUTPATIENT)
Dept: INTERNAL MEDICINE | Facility: CLINIC | Age: 83
End: 2018-10-23

## 2018-10-23 NOTE — TELEPHONE ENCOUNTER
"----- Message from Lillian Omkar sent at 10/23/2018  9:33 AM CDT -----  Contact: pt wife [phone]  RX request - refill or new RX.  Is this a refill or new RX:  Refill   RX name and strength: HYDROcodone-acetaminophen (NORCO) 5-325 mg per tablet  Directions:   Is this a 30 day or 90 day RX:    Local pharmacy or mail order pharmacy:  Local   Pharmacy name and phone # (DON'T enter "on file" or "in chart"): [facility] [place] [address] [phone] (Phone)  [phone] (Fax)      Comments:        "

## 2018-10-23 NOTE — TELEPHONE ENCOUNTER
Spoke with pharmacy, electronic rx was received in September. They are going to prepare the rx for the patient now.    Attempted to notify wife, no answer/no vm.

## 2018-10-26 ENCOUNTER — HOSPITAL ENCOUNTER (OUTPATIENT)
Facility: HOSPITAL | Age: 83
Discharge: HOME | End: 2018-10-26
Attending: INTERNAL MEDICINE | Admitting: INTERNAL MEDICINE
Payer: MEDICARE

## 2018-10-26 ENCOUNTER — ANESTHESIA EVENT (OUTPATIENT)
Dept: ENDOSCOPY | Facility: HOSPITAL | Age: 83
End: 2018-10-26
Payer: MEDICARE

## 2018-10-26 ENCOUNTER — ANESTHESIA (OUTPATIENT)
Dept: ENDOSCOPY | Facility: HOSPITAL | Age: 83
End: 2018-10-26
Payer: MEDICARE

## 2018-10-26 VITALS
BODY MASS INDEX: 24.27 KG/M2 | WEIGHT: 151 LBS | HEART RATE: 72 BPM | HEIGHT: 66 IN | DIASTOLIC BLOOD PRESSURE: 96 MMHG | RESPIRATION RATE: 20 BRPM | SYSTOLIC BLOOD PRESSURE: 178 MMHG | TEMPERATURE: 98 F | OXYGEN SATURATION: 96 %

## 2018-10-26 DIAGNOSIS — R10.13 EPIGASTRIC PAIN: Primary | ICD-10-CM

## 2018-10-26 LAB — POCT GLUCOSE: 98 MG/DL (ref 70–110)

## 2018-10-26 PROCEDURE — 63600175 PHARM REV CODE 636 W HCPCS: Performed by: NURSE ANESTHETIST, CERTIFIED REGISTERED

## 2018-10-26 PROCEDURE — 25000003 PHARM REV CODE 250: Performed by: NURSE ANESTHETIST, CERTIFIED REGISTERED

## 2018-10-26 PROCEDURE — 43239 EGD BIOPSY SINGLE/MULTIPLE: CPT | Performed by: INTERNAL MEDICINE

## 2018-10-26 PROCEDURE — 43239 EGD BIOPSY SINGLE/MULTIPLE: CPT | Mod: ,,, | Performed by: INTERNAL MEDICINE

## 2018-10-26 PROCEDURE — 37000008 HC ANESTHESIA 1ST 15 MINUTES: Performed by: INTERNAL MEDICINE

## 2018-10-26 PROCEDURE — 88342 IMHCHEM/IMCYTCHM 1ST ANTB: CPT | Mod: 26,,, | Performed by: PATHOLOGY

## 2018-10-26 PROCEDURE — 27201012 HC FORCEPS, HOT/COLD, DISP: Performed by: INTERNAL MEDICINE

## 2018-10-26 PROCEDURE — 88305 TISSUE EXAM BY PATHOLOGIST: CPT | Performed by: PATHOLOGY

## 2018-10-26 PROCEDURE — E9220 PRA ENDO ANESTHESIA: HCPCS | Mod: ,,, | Performed by: NURSE ANESTHETIST, CERTIFIED REGISTERED

## 2018-10-26 PROCEDURE — 25000003 PHARM REV CODE 250: Performed by: INTERNAL MEDICINE

## 2018-10-26 RX ORDER — SODIUM CHLORIDE 0.9 % (FLUSH) 0.9 %
3 SYRINGE (ML) INJECTION
Status: DISCONTINUED | OUTPATIENT
Start: 2018-10-26 | End: 2018-10-26 | Stop reason: HOSPADM

## 2018-10-26 RX ORDER — LIDOCAINE HYDROCHLORIDE 10 MG/ML
INJECTION, SOLUTION INTRAVENOUS
Status: DISCONTINUED | OUTPATIENT
Start: 2018-10-26 | End: 2018-10-26

## 2018-10-26 RX ORDER — GLYCOPYRROLATE 0.2 MG/ML
INJECTION INTRAMUSCULAR; INTRAVENOUS
Status: DISCONTINUED | OUTPATIENT
Start: 2018-10-26 | End: 2018-10-26

## 2018-10-26 RX ORDER — SODIUM CHLORIDE 9 MG/ML
INJECTION, SOLUTION INTRAVENOUS CONTINUOUS
Status: DISCONTINUED | OUTPATIENT
Start: 2018-10-26 | End: 2018-10-26 | Stop reason: HOSPADM

## 2018-10-26 RX ORDER — PROPOFOL 10 MG/ML
VIAL (ML) INTRAVENOUS
Status: DISCONTINUED | OUTPATIENT
Start: 2018-10-26 | End: 2018-10-26

## 2018-10-26 RX ADMIN — PROPOFOL 20 MG: 10 INJECTION, EMULSION INTRAVENOUS at 08:10

## 2018-10-26 RX ADMIN — LIDOCAINE HYDROCHLORIDE 50 MG: 10 INJECTION, SOLUTION INTRAVENOUS at 08:10

## 2018-10-26 RX ADMIN — SODIUM CHLORIDE: 0.9 INJECTION, SOLUTION INTRAVENOUS at 07:10

## 2018-10-26 RX ADMIN — GLYCOPYRROLATE 0.2 MG: 0.2 INJECTION, SOLUTION INTRAMUSCULAR; INTRAVENOUS at 08:10

## 2018-10-26 RX ADMIN — PROPOFOL 50 MG: 10 INJECTION, EMULSION INTRAVENOUS at 08:10

## 2018-10-26 NOTE — ANESTHESIA PREPROCEDURE EVALUATION
10/26/2018  Yogesh Shay is a 84 y.o., male.  Past Medical History:   Diagnosis Date    BPH (benign prostatic hypertrophy)     Cataract     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    Encounter for blood transfusion     Hyperlipidemia     Hypertension     Left lumbar radiculopathy 8/8/2012    Osteoarthritis 8/8/2012    Primary osteoarthritis of both knees 8/8/2012     Past Surgical History:   Procedure Laterality Date    APPENDECTOMY      CHOLECYSTECTOMY      COLON SURGERY      diverticulitis    COLONOSCOPY      COLONOSCOPY N/A 4/16/2014    Performed by Natan Salas MD at [facility] ENDO (4TH FLR)    EYE SURGERY      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, lower eyelid; od    HERNIA REPAIR Left 2018    open lih with mesh    LASER PERIPHERAL IRIDOTOMY Bilateral 04/2018    DR. PIERSON    LUMBAR FUSION  11/2007     L3-L4 Transforaminal Lumbar Interbody Fusion    REPAIR-HERNIA-INGUINAL-OPEN-LEFT w/ mesh Left 3/14/2018    Performed by Jose Zhong MD at [facility] OR 2ND FLR    TOTAL KNEE ARTHROPLASTY  2/2010    Left         Anesthesia Evaluation    I have reviewed the Patient Summary Reports.     I have reviewed the Medications.     Review of Systems  Anesthesia Hx:  Neg history of prior surgery. Denies Family Hx of Anesthesia complications.   Denies Personal Hx of Anesthesia complications.   Hematology/Oncology:  Hematology Normal   Oncology Normal     EENT/Dental:EENT/Dental Normal   Cardiovascular:  Cardiovascular Normal Exercise tolerance: good   Other Cardiac Conditions HTN  Pulmonary:  Pulmonary Normal    Renal/:  Renal/ Normal   Other Renal / Gu Conditions: Benign Prostatic Hypertrophy (BPH)  Hepatic/GI:  Hepatic/GI Normal    Musculoskeletal:  Musculoskeletal Normal    Neurological:  Neurology  Normal    Endocrine:  Endocrine Normal  Diabetes, Type 2 Diabetes    Dermatological:  Skin Normal    Psych:  Psychiatric Normal           Physical Exam  General:  Well nourished    Airway/Jaw/Neck:  Airway Findings: Mouth Opening: Normal Tongue: Normal  General Airway Assessment: Adult  Mallampati: II  Improves to II with phonation.  TM Distance: Normal, at least 6 cm  Jaw/Neck Findings:  Neck ROM: Normal ROM     Eyes/Ears/Nose:  EYES/EARS/NOSE FINDINGS: Normal   Dental:  Dental Findings: In tact 5 lower teeth intact. Otherwise edentulous   Chest/Lungs:  Chest/Lungs Findings: Clear to auscultation     Heart/Vascular:  Heart Findings: Rate: Normal  Rhythm: Regular Rhythm  Sounds: Normal  Heart murmur: negative Vascular Findings: Normal    Abdomen:  Abdomen Findings:  Normal, Soft, Nontender     Musculoskeletal:  Musculoskeletal Findings:    Skin:  Skin Findings:     Mental Status:  Mental Status Findings:  Cooperative, Alert and Oriented         Anesthesia Plan  Type of Anesthesia, risks & benefits discussed:  Anesthesia Type:  general  Patient's Preference: GENERAL  Intra-op Monitoring Plan: standard ASA monitors  Intra-op Monitoring Plan Comments:   Post Op Pain Control Plan:   Post Op Pain Control Plan Comments:   Induction:   IV  Beta Blocker:  Patient is on a Beta-Blocker and has received one dose within the past 24 hours (No further documentation required).       Informed Consent: Patient understands risks and agrees with Anesthesia plan.  Questions answered. Anesthesia consent signed with patient.  ASA Score: 3     Day of Surgery Review of History & Physical: I have interviewed and examined the patient. I have reviewed the patient's H&P dated:  There are no significant changes.  H&P update referred to the surgeon.         Ready For Surgery From Anesthesia Perspective.                                                                                                                03/06/2018  Yogesh hSay  is a 83 y.o., male.    Anesthesia Evaluation    I have reviewed the Patient Summary Reports.    I have reviewed the Nursing Notes.   I have reviewed the Medications.     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  Denies Family Hx of Anesthesia complications.    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  Denies Sleep Apnea.    Renal/:   BPH    Hepatic/GI:   GERD Wt loss of 40 lbs over 2 yrs; poor appetite since teeth extracted   Musculoskeletal:   Arthritis (s/p L TKA )   Spine Disorders: lumbar Chronic Pain    Neurological:   Denies CVA. Denies Seizures.  Pain , onset is chronic , location of lower back , quality of aching/dull, sharp , severity is a 6   Peripheral Neuropathy    Endocrine:   Diabetes (A1C 5.6 1/26/18), well controlled, type 2        Physical Exam  General:  Well nourished    Airway/Jaw/Neck:  Airway Findings: Mouth Opening: Small, but > 3cm Tongue: Normal  General Airway Assessment: Adult  Jaw/Neck Findings:     Neck ROM: Extension Decreased, Mild      Dental:  Dental Findings:    Chest/Lungs:  Chest/Lungs Findings: Clear to auscultation, Normal Respiratory Rate     Heart/Vascular:  Heart Findings: Rate: Normal  Rhythm: Regular Rhythm  Sounds: Normal        Mental Status:  Mental Status Findings:  Cooperative, Alert and Oriented       Pt was seen in POC 3/6/18/Shiloh Gandhi RN        Anesthesia Plan  Type of Anesthesia, risks & benefits discussed:  Anesthesia Type:  general  Patient's  Preference:   Intra-op Monitoring Plan: standard ASA monitors  Intra-op Monitoring Plan Comments:   Post Op Pain Control Plan: multimodal analgesia and per primary service following discharge from PACU  Post Op Pain Control Plan Comments:   Induction:   IV  Beta Blocker:  Patient is on a Beta-Blocker and has received one dose within the past 24 hours (No further documentation required).       Informed Consent: Patient understands risks and agrees with Anesthesia plan.  Questions answered. Anesthesia consent signed with patient.  ASA Score: 3     Day of Surgery Review of History & Physical:    H&P update referred to the surgeon.         Ready For Surgery From Anesthesia Perspective.

## 2018-10-26 NOTE — [employer] PATIENT INSTRUCTIONS
Discharge Summary/Instructions after an Endoscopic Procedure  Patient Name: Yogesh Shay  Patient MRN: [id number]  Patient YOB: 1934 Friday, October 26, 2018  Sanya Alcantar MD  RESTRICTIONS:  During your procedure today, you received medications for sedation.  These   medications may affect your judgment, balance and coordination.  Therefore,   for 24 hours, you have the following restrictions:   - DO NOT drive a car, operate machinery, make legal/financial decisions,   sign important papers or drink alcohol.    ACTIVITY:  Today: no heavy lifting, straining or running due to procedural   sedation/anesthesia.  The following day: return to full activity including work.  DIET:  Eat and drink normally unless instructed otherwise.     TREATMENT FOR COMMON SIDE EFFECTS:  - Mild abdominal pain, nausea, belching, bloating or excessive gas:  rest,   eat lightly and use a heating pad.  - Sore Throat: treat with throat lozenges and/or gargle with warm salt   water.  - Because air was used during the procedure, expelling large amounts of air   from your rectum or belching is normal.  - If a bowel prep was taken, you may not have a bowel movement for 1-3 days.    This is normal.  SYMPTOMS TO WATCH FOR AND REPORT TO YOUR PHYSICIAN:  1. Abdominal pain or bloating, other than gas cramps.  2. Chest pain.  3. Back pain.  4. Signs of infection such as: chills or fever occurring within 24 hours   after the procedure.  5. Rectal bleeding, which would show as bright red, maroon, or black stools.   (A tablespoon of blood from the rectum is not serious, especially if   hemorrhoids are present.)  6. Vomiting.  7. Weakness or dizziness.  GO DIRECTLY TO THE NEAREST EMERGENCY ROOM IF YOU HAVE ANY OF THE FOLLOWING:      Difficulty breathing              Chills and/or fever over 101 F   Persistent vomiting and/or vomiting blood   Severe abdominal pain   Severe chest pain   Black, tarry stools   Bleeding- more than one  tablespoon   Any other symptom or condition that you feel may need urgent attention  Your doctor recommends these additional instructions:  If any biopsies were taken, your doctors clinic will contact you in 1 to 2   weeks with any results.  - Discharge patient to home.   - Patient has a contact number available for emergencies.  The signs and   symptoms of potential delayed complications were discussed with the   patient.  Return to normal activities tomorrow.  Written discharge   instructions were provided to the patient.   - Resume previous diet.   - Continue present medications.   - Await pathology results.   - Return to referring physician.   - Avoid NSAIDs (ibuprofen, advil, motrin, aleve)  - Omeprazole (prilosec)- increase to 40 mg po daily.   For questions, problems or results please call your physician - Sanya Alcantar MD at Work:  [phone].  [facility], EMERGENCY ROOM PHONE NUMBER: [phone]  IF A COMPLICATION OR EMERGENCY SITUATION ARISES AND YOU ARE UNABLE TO REACH   YOUR PHYSICIAN - GO DIRECTLY TO THE EMERGENCY ROOM.  Sanya Alcantar MD  10/26/2018 8:12:12 AM  This report has been verified and signed electronically.  [employer]

## 2018-10-26 NOTE — DISCHARGE INSTRUCTIONS

## 2018-10-26 NOTE — TRANSFER OF CARE
"Anesthesia Transfer of Care Note    Patient: Yogesh Shay    Procedure(s) Performed: Procedure(s) (LRB):  ESOPHAGOGASTRODUODENOSCOPY (EGD) (N/A)    Patient location: GI    Anesthesia Type: general    Post pain: adequate analgesia    Post assessment: no apparent anesthetic complications    Post vital signs: stable    Level of consciousness: sedated    Nausea/Vomiting: no nausea/vomiting    Complications: none    Transfer of care protocol was followed      Last vitals:   Visit Vitals  BP (!) 183/89 (BP Location: Left arm, Patient Position: Lying)   Pulse 68   Temp 36 °C (96.8 °F) (Skin)   Resp 17   Ht 5' 6" (1.676 m)   Wt 68.5 kg (151 lb)   SpO2 97%   BMI 24.37 kg/m²     "

## 2018-10-26 NOTE — ANESTHESIA POSTPROCEDURE EVALUATION
"Anesthesia Post Evaluation    Patient: Yogesh Shay    Procedure(s) Performed: Procedure(s) (LRB):  ESOPHAGOGASTRODUODENOSCOPY (EGD) (N/A)    Final Anesthesia Type: general  Patient location during evaluation: PACU  Patient participation: Yes- Able to Participate  Level of consciousness: awake and alert  Post-procedure vital signs: reviewed and stable  Pain management: adequate  Airway patency: patent  PONV status at discharge: No PONV  Anesthetic complications: no      Cardiovascular status: blood pressure returned to baseline  Respiratory status: spontaneous ventilation and room air  Hydration status: euvolemic  Follow-up not needed.        Visit Vitals  BP (!) 178/96   Pulse 72   Temp 36.8 °C (98.2 °F)   Resp 20   Ht 5' 6" (1.676 m)   Wt 68.5 kg (151 lb)   SpO2 96%   BMI 24.37 kg/m²       Pain/Marian Score: Pain Assessment Performed: Yes (10/26/2018  8:45 AM)  Presence of Pain: denies (10/26/2018  8:45 AM)  Marian Score: 10 (10/26/2018  8:45 AM)        "

## 2018-10-26 NOTE — H&P
Short Stay Endoscopy History and Physical    PCP - Aubrey Root MD  Referring Physician - Aubrey Root MD  [address] [place] [address]    Procedure - EGD  ASA - per anesthesia  Mallampati - per anesthesia  History of Anesthesia problems - no  Family history Anesthesia problems -  no   Plan of anesthesia - General    HPI  84 y.o. 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    Surgical History:  has a past surgical history that includes Total knee arthroplasty (2/2010); Lumbar fusion (11/2007); Cholecystectomy; Eye surgery; Colon surgery; Appendectomy; Colonoscopy; Hernia repair (Left, 2018); LASER PERIPHERAL IRIDOTOMY (Bilateral, 04/2018); REPAIR-HERNIA-INGUINAL-OPEN-LEFT w/ mesh (Left, 3/14/2018); and COLONOSCOPY (N/A, 4/16/2014).    Family History: family history includes Cancer in his mother and sister; Heart disease in his father; No Known Problems in his brother, maternal aunt, maternal grandfather, maternal grandmother, maternal uncle, paternal aunt, paternal grandfather, paternal grandmother, and paternal uncle.. Otherwise no colon cancer, inflammatory bowel disease, or GI malignancies.    Social History:  reports that he quit smoking about 43 years ago. His smoking use included cigarettes. He has quit using smokeless tobacco. He reports that he does not drink alcohol or use drugs.    Review of patient's allergies indicates:   Allergen Reactions    Sulfa (sulfonamide antibiotics)  Swelling     Lips and face       Medications:   Medications Prior to Admission   Medication Sig Dispense Refill Last Dose    acetaminophen (TYLENOL EXTRA STRENGTH) 500 MG tablet Take 500 mg by mouth every 6 (six) hours as needed for Pain.   10/25/2018 at Unknown time    aspirin 81 mg Tab Take by mouth every evening. 1 Tablet Oral Every day   Past Week at Unknown time    atenolol (TENORMIN) 50 MG tablet TAKE ONE AND ONE-HALF TABLETS TWICE A  tablet 2 10/25/2018 at Unknown time    diclofenac (CATAFLAM) 50 MG tablet TAKE 1 TABLET THREE TIMES A  tablet 3 10/25/2018 at Unknown time    gabapentin (NEURONTIN) 300 MG capsule TAKE 1 CAPSULE EVERY EVENING AT BEDTIME 90 capsule 3 Past Week at Unknown time    hydroCHLOROthiazide (HYDRODIURIL) 25 MG tablet Take 1 tablet (25 mg total) by mouth once daily. 90 tablet 3 10/25/2018 at Unknown time    HYDROcodone-acetaminophen (NORCO) 5-325 mg per tablet Take 1 tablet by mouth every 6 (six) hours as needed for Pain. 60 tablet 0 10/25/2018 at Unknown time    latanoprost 0.005 % ophthalmic solution Place 1 drop into both eyes once daily. 2.5 mL 12 10/25/2018 at Unknown time    lisinopril 10 MG tablet Take 1 tablet (10 mg total) by mouth once daily. 90 tablet 3 Past Week at Unknown time    omeprazole (PRILOSEC) 20 MG capsule TAKE 1 CAPSULE DAILY 90 capsule 3 10/25/2018 at Unknown time    ondansetron (ZOFRAN-ODT) 8 MG TbDL Take 1 tablet (8 mg total) by mouth every 8 (eight) hours as needed (nausea). 30 tablet 1 Past Month at Unknown time    pravastatin (PRAVACHOL) 10 MG tablet TAKE 1 TABLET DAILY 90 tablet 3 10/25/2018 at Unknown time    tamsulosin (FLOMAX) 0.4 mg Cp24 TAKE 1 CAPSULE DAILY 90 capsule 2 10/25/2018 at Unknown time    metFORMIN (GLUCOPHAGE) 500 MG tablet TAKE 1 TABLET TWICE A DAY WITH MEALS 180 tablet 3 Taking    polyethylene glycol (GLYCOLAX) 17 gram/dose powder Take 17 g by mouth once daily. 510 g 3 More than a month at Unknown time        Physical Exam:    Vital Signs:   Vitals:    10/26/18 0749   BP: (!) 183/89   Pulse: 68   Resp: 17   Temp: 96.8 °F (36 °C)       General Appearance: Well appearing in no acute distress  Abdomen: Soft, non tender, non distended with normal bowel sounds, no masses      Labs:  Lab Results   Component Value Date    WBC 6.46 09/25/2018    HGB 12.6 (L) 09/25/2018    HCT 37.5 (L) 09/25/2018     09/25/2018    CHOL 151 09/25/2018    TRIG 122 09/25/2018    HDL 59 09/25/2018    ALT 12 09/25/2018    AST 19 09/25/2018     (L) 09/25/2018    K 4.4 09/25/2018    CL 98 09/25/2018    CREATININE 0.8 09/25/2018    BUN 12 09/25/2018    CO2 24 09/25/2018    TSH 1.467 05/24/2012    PSA 0.52 05/24/2012    INR 2.4 (H) 02/25/2010    HGBA1C 5.4 09/25/2018       I have explained the risks and benefits of this endoscopic procedure to the patient including but not limited to bleeding, inflammation, infection, perforation, and death.      Sanya Alcantar MD

## 2018-11-02 ENCOUNTER — TELEPHONE (OUTPATIENT)
Dept: ENDOSCOPY | Facility: HOSPITAL | Age: 83
End: 2018-11-02

## 2018-11-08 ENCOUNTER — TELEPHONE (OUTPATIENT)
Dept: INTERNAL MEDICINE | Facility: CLINIC | Age: 83
End: 2018-11-08

## 2018-11-08 DIAGNOSIS — D51.9 ANEMIA DUE TO VITAMIN B12 DEFICIENCY, UNSPECIFIED B12 DEFICIENCY TYPE: Primary | ICD-10-CM

## 2018-11-09 NOTE — TELEPHONE ENCOUNTER
Haydee, Dr Cazabon called the patient. ? Receiving this message?  Dr Root do you want me to call the patient?

## 2018-11-09 NOTE — TELEPHONE ENCOUNTER
----- Message from Haydee Cisneros sent at 11/9/2018  8:43 AM CST -----  Contact: wife/yo/[phone]      ----- Message -----  From: Christa Purcell  Sent: 11/9/2018   8:34 AM  To: Ivett Burgos Staff    Pt wife called in regards to 3 missed call from the dr about lab results.      Please advise

## 2018-11-19 ENCOUNTER — TELEPHONE (OUTPATIENT)
Dept: INTERNAL MEDICINE | Facility: CLINIC | Age: 83
End: 2018-11-19

## 2018-11-19 DIAGNOSIS — M54.9 BACK PAIN, UNSPECIFIED BACK LOCATION, UNSPECIFIED BACK PAIN LATERALITY, UNSPECIFIED CHRONICITY: ICD-10-CM

## 2018-11-19 RX ORDER — HYDROCODONE BITARTRATE AND ACETAMINOPHEN 5; 325 MG/1; MG/1
1 TABLET ORAL EVERY 6 HOURS PRN
Qty: 60 TABLET | Refills: 0 | Status: SHIPPED | OUTPATIENT
Start: 2018-11-19 | End: 2018-12-19

## 2018-11-19 NOTE — TELEPHONE ENCOUNTER
----- Message from Judy Hurt sent at 11/19/2018 10:43 AM CST -----  Contact: [phone]  Type: Rx    Name of medication(s): HYDROcodone-acetaminophen (NORCO) 5-325 mg per tablet    Is this a refill? New rx? Refill  Patient needs a new prescription    Who prescribed medication?  [facility]  Pharmacy Name, Phone, & Location:  [facility] [place] [address]    Comments:  Please advise, thank you

## 2018-12-04 ENCOUNTER — CLINICAL SUPPORT (OUTPATIENT)
Dept: OPHTHALMOLOGY | Facility: CLINIC | Age: 83
End: 2018-12-04
Payer: MEDICARE

## 2018-12-04 ENCOUNTER — OFFICE VISIT (OUTPATIENT)
Dept: OPHTHALMOLOGY | Facility: CLINIC | Age: 83
End: 2018-12-04
Payer: MEDICARE

## 2018-12-04 ENCOUNTER — LAB VISIT (OUTPATIENT)
Dept: LAB | Facility: HOSPITAL | Age: 83
End: 2018-12-04
Attending: INTERNAL MEDICINE
Payer: MEDICARE

## 2018-12-04 DIAGNOSIS — H40.033 ANATOMICAL NARROW ANGLE BORDERLINE GLAUCOMA OF BOTH EYES: Primary | ICD-10-CM

## 2018-12-04 DIAGNOSIS — H25.13 NUCLEAR SENILE CATARACT OF BOTH EYES: ICD-10-CM

## 2018-12-04 DIAGNOSIS — H21.81 TAMSULOSIN-ASSOCIATED INTRAOPERATIVE FLOPPY IRIS SYNDROME (IFIS): ICD-10-CM

## 2018-12-04 DIAGNOSIS — H40.033 ANATOMICAL NARROW ANGLE OF BOTH EYES: ICD-10-CM

## 2018-12-04 DIAGNOSIS — T44.6X5A TAMSULOSIN-ASSOCIATED INTRAOPERATIVE FLOPPY IRIS SYNDROME (IFIS): ICD-10-CM

## 2018-12-04 DIAGNOSIS — D51.9 ANEMIA DUE TO VITAMIN B12 DEFICIENCY, UNSPECIFIED B12 DEFICIENCY TYPE: ICD-10-CM

## 2018-12-04 DIAGNOSIS — Z98.890 S/P LASER IRIDOTOMY: ICD-10-CM

## 2018-12-04 DIAGNOSIS — H40.053 OCULAR HYPERTENSION, BILATERAL: ICD-10-CM

## 2018-12-04 DIAGNOSIS — H57.03 SMALL PUPILS: ICD-10-CM

## 2018-12-04 LAB — VIT B12 SERPL-MCNC: 1169 PG/ML

## 2018-12-04 PROCEDURE — 92012 INTRM OPH EXAM EST PATIENT: CPT | Mod: S$PBB,,, | Performed by: OPHTHALMOLOGY

## 2018-12-04 PROCEDURE — 36415 COLL VENOUS BLD VENIPUNCTURE: CPT

## 2018-12-04 PROCEDURE — 99212 OFFICE O/P EST SF 10 MIN: CPT | Mod: PBBFAC,25 | Performed by: OPHTHALMOLOGY

## 2018-12-04 PROCEDURE — 82607 VITAMIN B-12: CPT

## 2018-12-04 PROCEDURE — 99999 PR PBB SHADOW E&M-EST. PATIENT-LVL II: CPT | Mod: PBBFAC,,, | Performed by: OPHTHALMOLOGY

## 2018-12-04 PROCEDURE — 92133 CPTRZD OPH DX IMG PST SGM ON: CPT | Mod: PBBFAC | Performed by: OPHTHALMOLOGY

## 2018-12-04 NOTE — PROGRESS NOTES
Assessment /Plan     For exam results, see Encounter Report.    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        Glaucoma meds    Latanoprost ou - started 6/15/2018         H/O adverse rxn to glaucoma drops    none        LASERS    Laser PI's 4/2018 - od // OS 5/2018         GLAUCOMA SURGERIES    none        OTHER EYE SURGERIES    none        CDR    0.7/0.4         Tbase    19-27  od // 22-40 os         Tmax     27/40          Ttarget    ?             HVF    1 test 2018 to  2018 - unreliable  od // unreliable  os        Gonio    +3 w/ steep approach - post PI's ou         CCT    518 / 528        OCT    1 test 2018 to 2018 - RNFL - bord T od // bord TS os        HRT    1 test 2018 to 2018 - MR - high std dev. od // high std dev os        Disc photos    6/2018     - Ttoday    13/15os (on latanoprost - ( down from 26/40))  - Test done today    HRT - high std dev.    2. NS ou    Pt does not want cataract surgery at this time    Has other health issues    ON FLOWMAX     3. Diabetes    No BDR seen on exam 6/15/2018     4. H/O tumor / cancer RLL    S/P removal and re-construction at [facility]    The biopsy was done by amanda - but the surgery was done by some one else     5. Has some abdominal issues    Having surgery next week - 3/15/2018     6. Episopde of blurry vision ou    [facility]      PLAN      CSM   Good resp to latanoprost   F/U with gonio 4  months   // AR/MR/BAT - cataract check - whenever done it will be complex // VERY shallow AC and very dense lens / on flomax - high risk for corneal edema and other complications

## 2018-12-19 DIAGNOSIS — M54.9 BACK PAIN, UNSPECIFIED BACK LOCATION, UNSPECIFIED BACK PAIN LATERALITY, UNSPECIFIED CHRONICITY: ICD-10-CM

## 2018-12-19 RX ORDER — HYDROCODONE BITARTRATE AND ACETAMINOPHEN 5; 325 MG/1; MG/1
1 TABLET ORAL EVERY 6 HOURS PRN
Qty: 60 TABLET | Refills: 0 | Status: SHIPPED | OUTPATIENT
Start: 2018-12-19 | End: 2019-01-21 | Stop reason: SDUPTHER

## 2018-12-19 NOTE — TELEPHONE ENCOUNTER
----- Message from Judy Hurt sent at 12/19/2018  8:45 AM CST -----  Contact: [phone]  Type: Rx    Name of medication(s): HYDROcodone-acetaminophen (NORCO) 5-325 mg per tablet    Is this a refill? New rx?    Who prescribed medication?  [facility]    Pharmacy Name, Phone, & Location: [facility] [place] [address]    Comments:  Please advise, thank you

## 2019-01-03 RX ORDER — ATENOLOL 50 MG/1
TABLET ORAL
Qty: 270 TABLET | Refills: 2 | Status: SHIPPED | OUTPATIENT
Start: 2019-01-03 | End: 2019-09-29 | Stop reason: SDUPTHER

## 2019-01-03 RX ORDER — TAMSULOSIN HYDROCHLORIDE 0.4 MG/1
CAPSULE ORAL
Qty: 90 CAPSULE | Refills: 2 | Status: SHIPPED | OUTPATIENT
Start: 2019-01-03 | End: 2019-09-29 | Stop reason: SDUPTHER

## 2019-01-21 ENCOUNTER — OFFICE VISIT (OUTPATIENT)
Dept: INTERNAL MEDICINE | Facility: CLINIC | Age: 84
End: 2019-01-21
Payer: MEDICARE

## 2019-01-21 ENCOUNTER — LAB VISIT (OUTPATIENT)
Dept: LAB | Facility: HOSPITAL | Age: 84
End: 2019-01-21
Attending: INTERNAL MEDICINE
Payer: MEDICARE

## 2019-01-21 VITALS
HEIGHT: 66 IN | DIASTOLIC BLOOD PRESSURE: 78 MMHG | HEART RATE: 68 BPM | BODY MASS INDEX: 24.98 KG/M2 | SYSTOLIC BLOOD PRESSURE: 128 MMHG | WEIGHT: 155.44 LBS

## 2019-01-21 DIAGNOSIS — E53.8 B12 DEFICIENCY: ICD-10-CM

## 2019-01-21 DIAGNOSIS — M54.9 BACK PAIN, UNSPECIFIED BACK LOCATION, UNSPECIFIED BACK PAIN LATERALITY, UNSPECIFIED CHRONICITY: ICD-10-CM

## 2019-01-21 DIAGNOSIS — E11.42 CONTROLLED TYPE 2 DIABETES MELLITUS WITH DIABETIC POLYNEUROPATHY, WITHOUT LONG-TERM CURRENT USE OF INSULIN: Primary | ICD-10-CM

## 2019-01-21 DIAGNOSIS — E11.42 CONTROLLED TYPE 2 DIABETES MELLITUS WITH DIABETIC POLYNEUROPATHY, WITHOUT LONG-TERM CURRENT USE OF INSULIN: ICD-10-CM

## 2019-01-21 DIAGNOSIS — E11.42 DIABETIC POLYNEUROPATHY ASSOCIATED WITH TYPE 2 DIABETES MELLITUS: Primary | ICD-10-CM

## 2019-01-21 LAB
ALBUMIN SERPL BCP-MCNC: 4.2 G/DL
ALP SERPL-CCNC: 66 U/L
ALT SERPL W/O P-5'-P-CCNC: 12 U/L
ANION GAP SERPL CALC-SCNC: 10 MMOL/L
AST SERPL-CCNC: 17 U/L
BASOPHILS # BLD AUTO: 0.02 K/UL
BASOPHILS NFR BLD: 0.3 %
BILIRUB SERPL-MCNC: 0.4 MG/DL
BUN SERPL-MCNC: 13 MG/DL
CALCIUM SERPL-MCNC: 9.6 MG/DL
CHLORIDE SERPL-SCNC: 93 MMOL/L
CO2 SERPL-SCNC: 26 MMOL/L
CREAT SERPL-MCNC: 0.9 MG/DL
DIFFERENTIAL METHOD: ABNORMAL
EOSINOPHIL # BLD AUTO: 0.2 K/UL
EOSINOPHIL NFR BLD: 2.8 %
ERYTHROCYTE [DISTWIDTH] IN BLOOD BY AUTOMATED COUNT: 12.5 %
EST. GFR  (AFRICAN AMERICAN): >60 ML/MIN/1.73 M^2
EST. GFR  (NON AFRICAN AMERICAN): >60 ML/MIN/1.73 M^2
ESTIMATED AVG GLUCOSE: 120 MG/DL
GLUCOSE SERPL-MCNC: 98 MG/DL
HBA1C MFR BLD HPLC: 5.8 %
HCT VFR BLD AUTO: 38.4 %
HGB BLD-MCNC: 13 G/DL
LYMPHOCYTES # BLD AUTO: 2.2 K/UL
LYMPHOCYTES NFR BLD: 28.7 %
MCH RBC QN AUTO: 31.6 PG
MCHC RBC AUTO-ENTMCNC: 33.9 G/DL
MCV RBC AUTO: 93 FL
MONOCYTES # BLD AUTO: 0.6 K/UL
MONOCYTES NFR BLD: 7.6 %
NEUTROPHILS # BLD AUTO: 4.7 K/UL
NEUTROPHILS NFR BLD: 60.5 %
PLATELET # BLD AUTO: 191 K/UL
PMV BLD AUTO: 9.2 FL
POTASSIUM SERPL-SCNC: 5.4 MMOL/L
PROT SERPL-MCNC: 7.4 G/DL
RBC # BLD AUTO: 4.12 M/UL
SODIUM SERPL-SCNC: 129 MMOL/L
WBC # BLD AUTO: 7.73 K/UL

## 2019-01-21 PROCEDURE — 80053 COMPREHEN METABOLIC PANEL: CPT

## 2019-01-21 PROCEDURE — 99999 PR PBB SHADOW E&M-EST. PATIENT-LVL III: CPT | Mod: PBBFAC,,, | Performed by: INTERNAL MEDICINE

## 2019-01-21 PROCEDURE — 99999 PR PBB SHADOW E&M-EST. PATIENT-LVL III: ICD-10-PCS | Mod: PBBFAC,,, | Performed by: INTERNAL MEDICINE

## 2019-01-21 PROCEDURE — 85025 COMPLETE CBC W/AUTO DIFF WBC: CPT

## 2019-01-21 PROCEDURE — 99213 OFFICE O/P EST LOW 20 MIN: CPT | Mod: S$PBB,,, | Performed by: INTERNAL MEDICINE

## 2019-01-21 PROCEDURE — 36415 COLL VENOUS BLD VENIPUNCTURE: CPT

## 2019-01-21 PROCEDURE — 99213 PR OFFICE/OUTPT VISIT, EST, LEVL III, 20-29 MIN: ICD-10-PCS | Mod: S$PBB,,, | Performed by: INTERNAL MEDICINE

## 2019-01-21 PROCEDURE — 99213 OFFICE O/P EST LOW 20 MIN: CPT | Mod: PBBFAC | Performed by: INTERNAL MEDICINE

## 2019-01-21 PROCEDURE — 83036 HEMOGLOBIN GLYCOSYLATED A1C: CPT

## 2019-01-21 RX ORDER — HYDROCODONE BITARTRATE AND ACETAMINOPHEN 5; 325 MG/1; MG/1
1 TABLET ORAL EVERY 6 HOURS PRN
Qty: 60 TABLET | Refills: 0 | Status: SHIPPED | OUTPATIENT
Start: 2019-01-21 | End: 2019-02-21 | Stop reason: SDUPTHER

## 2019-02-21 DIAGNOSIS — M54.9 BACK PAIN, UNSPECIFIED BACK LOCATION, UNSPECIFIED BACK PAIN LATERALITY, UNSPECIFIED CHRONICITY: ICD-10-CM

## 2019-02-21 NOTE — TELEPHONE ENCOUNTER
----- Message from Leta Lacy sent at 2/21/2019  8:39 AM CST -----  Contact: [phone]  RX request - refill or new RX.  Is this a refill or new RX:refill     RX name and strength: HYDROcodone-acetaminophen (NORCO) 5-325 mg per tablet    Local pharmacy or mail order pharmacy:  [facility] [place] [address]   [phone] (Phone)  [phone] (Fax)        Comments:  Please advise, thanks

## 2019-02-22 RX ORDER — HYDROCODONE BITARTRATE AND ACETAMINOPHEN 5; 325 MG/1; MG/1
1 TABLET ORAL EVERY 6 HOURS PRN
Qty: 60 TABLET | Refills: 0 | Status: SHIPPED | OUTPATIENT
Start: 2019-02-22 | End: 2019-03-21 | Stop reason: SDUPTHER

## 2019-02-28 RX ORDER — GABAPENTIN 300 MG/1
CAPSULE ORAL
Qty: 90 CAPSULE | Refills: 3 | Status: CANCELLED | OUTPATIENT
Start: 2019-02-28

## 2019-02-28 RX ORDER — GABAPENTIN 300 MG/1
CAPSULE ORAL
Qty: 90 CAPSULE | Refills: 3 | Status: SHIPPED | OUTPATIENT
Start: 2019-02-28 | End: 2020-05-18 | Stop reason: SDUPTHER

## 2019-02-28 NOTE — TELEPHONE ENCOUNTER
----- Message from Donavan Hernandez sent at 2/28/2019  3:04 PM CST -----  Contact: Patient [phone] or [phone]  RX request - refill or new RX.  Is this a refill or new RX:  Refill  RX name and strength: gabapentin (NEURONTIN) 300 MG capsule    Is this a 30 day or 90 day RX:  90 Day Supply    Pharmacy name and phone # [facility] - [place], [place] - [address] [place] [address]    Comments:  Spouse calling would like a call to inform has been sent.     Please call an advise  Thank you

## 2019-03-13 DIAGNOSIS — K21.9 GERD (GASTROESOPHAGEAL REFLUX DISEASE): ICD-10-CM

## 2019-03-14 RX ORDER — OMEPRAZOLE 20 MG/1
CAPSULE, DELAYED RELEASE ORAL
Qty: 90 CAPSULE | Refills: 3 | Status: SHIPPED | OUTPATIENT
Start: 2019-03-14 | End: 2020-03-02

## 2019-03-20 ENCOUNTER — TELEPHONE (OUTPATIENT)
Dept: INTERNAL MEDICINE | Facility: CLINIC | Age: 84
End: 2019-03-20

## 2019-03-20 NOTE — TELEPHONE ENCOUNTER
----- Message from Aubrey Root MD sent at 3/19/2019  5:34 PM CDT -----  Pls advise the patient the diagnostic study attached was acceptable.  Thanks

## 2019-03-21 DIAGNOSIS — M54.9 BACK PAIN, UNSPECIFIED BACK LOCATION, UNSPECIFIED BACK PAIN LATERALITY, UNSPECIFIED CHRONICITY: ICD-10-CM

## 2019-03-21 RX ORDER — HYDROCODONE BITARTRATE AND ACETAMINOPHEN 5; 325 MG/1; MG/1
1 TABLET ORAL EVERY 6 HOURS PRN
Qty: 60 TABLET | Refills: 0 | Status: SHIPPED | OUTPATIENT
Start: 2019-03-21 | End: 2019-04-22 | Stop reason: SDUPTHER

## 2019-03-21 NOTE — TELEPHONE ENCOUNTER
----- Message from Donavan Hernandez sent at 3/21/2019  8:13 AM CDT -----  Contact: Zaida [phone]  RX request - refill or new RX.  Is this a refill or new RX: Refill   RX name and strength: HYDROcodone-acetaminophen (NORCO) 5-325 mg per tablet    Pharmacy name and phone # [facility] [place] [address] [facility] [phone] (Phone) [phone] (Fax)    Please call an advise  Thank you

## 2019-03-22 DIAGNOSIS — I16.0 HYPERTENSIVE URGENCY: ICD-10-CM

## 2019-03-22 DIAGNOSIS — I10 ESSENTIAL HYPERTENSION: ICD-10-CM

## 2019-03-23 RX ORDER — METFORMIN HYDROCHLORIDE 500 MG/1
TABLET ORAL
Qty: 180 TABLET | Refills: 3 | Status: SHIPPED | OUTPATIENT
Start: 2019-03-23 | End: 2020-03-18

## 2019-03-23 RX ORDER — LISINOPRIL 10 MG/1
TABLET ORAL
Qty: 90 TABLET | Refills: 3 | Status: SHIPPED | OUTPATIENT
Start: 2019-03-23 | End: 2020-03-18

## 2019-04-16 ENCOUNTER — OFFICE VISIT (OUTPATIENT)
Dept: OPHTHALMOLOGY | Facility: CLINIC | Age: 84
End: 2019-04-16
Payer: MEDICARE

## 2019-04-16 DIAGNOSIS — T44.6X5A TAMSULOSIN-ASSOCIATED INTRAOPERATIVE FLOPPY IRIS SYNDROME (IFIS): ICD-10-CM

## 2019-04-16 DIAGNOSIS — H57.03 SMALL PUPILS: ICD-10-CM

## 2019-04-16 DIAGNOSIS — H40.053 OCULAR HYPERTENSION, BILATERAL: ICD-10-CM

## 2019-04-16 DIAGNOSIS — Z98.890 S/P LASER IRIDOTOMY: ICD-10-CM

## 2019-04-16 DIAGNOSIS — H21.81 TAMSULOSIN-ASSOCIATED INTRAOPERATIVE FLOPPY IRIS SYNDROME (IFIS): ICD-10-CM

## 2019-04-16 DIAGNOSIS — H40.033 ANATOMICAL NARROW ANGLE BORDERLINE GLAUCOMA OF BOTH EYES: Primary | ICD-10-CM

## 2019-04-16 DIAGNOSIS — H25.13 NUCLEAR SENILE CATARACT OF BOTH EYES: ICD-10-CM

## 2019-04-16 PROCEDURE — 99999 PR PBB SHADOW E&M-EST. PATIENT-LVL II: CPT | Mod: PBBFAC,,, | Performed by: OPHTHALMOLOGY

## 2019-04-16 PROCEDURE — 99999 PR PBB SHADOW E&M-EST. PATIENT-LVL II: ICD-10-PCS | Mod: PBBFAC,,, | Performed by: OPHTHALMOLOGY

## 2019-04-16 PROCEDURE — 92012 PR EYE EXAM, EST PATIENT,INTERMED: ICD-10-PCS | Mod: S$PBB,,, | Performed by: OPHTHALMOLOGY

## 2019-04-16 PROCEDURE — 92012 INTRM OPH EXAM EST PATIENT: CPT | Mod: S$PBB,,, | Performed by: OPHTHALMOLOGY

## 2019-04-16 PROCEDURE — 99212 OFFICE O/P EST SF 10 MIN: CPT | Mod: PBBFAC | Performed by: OPHTHALMOLOGY

## 2019-04-16 NOTE — PROGRESS NOTES
HPI     Glaucoma      Additional comments: 4 month ck and pt states no changes since last exam                Glare      Additional comments: Pt c/o diffculty with driving at night and glare              Comments     DLSL 12/4/18  -Pt's here with his wife who states that ot occasionally c/o difficulty   seeing clearly. Pt states he does not wear his prescription glasses often   and just uses OTC readers.    1. Hx Narrow Angles OU  2. NS OU  3. Type 2 DM no BDR  4. Hx Tumor/Cancer RLL    MEDS:  Latanoprost QHS OU                Last edited by Nitza Maya MA on 4/16/2019 10:47 AM. (History)            Assessment /Plan     For exam results, see Encounter Report.    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        Glaucoma meds    Latanoprost ou - started 6/15/2018         H/O adverse rxn to glaucoma drops    none        LASERS    Laser PI's 4/2018 - od // OS 5/2018         GLAUCOMA SURGERIES    none        OTHER EYE SURGERIES    none        CDR    0.7/0.4         Tbase    19-27  od // 22-40 os         Tmax     27/40          Ttarget    ?             HVF    1 test 2018 to  2018 - unreliable  od // unreliable  os        Gonio    +3 w/ steep approach - post PI's ou         CCT    518 / 528        OCT    1 test 2018 to 2018 - RNFL - bord T od // bord TS os        HRT    1 test 2018 to 2018 - MR - high std dev. od // high std dev os        Disc photos    6/2018     - Ttoday     16/20 (on latanoprost - ( down from 26/40))  - Test done today    HRT - high std dev.    2. 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 Diabetes    No BDR seen on exam 6/15/2018     4. H/O tumor / cancer RLL    S/P removal and re-construction at [facility]    The biopsy was done by amanda - but the surgery was done by some one else     5. Has some abdominal issues    Having surgery next week - 3/15/2018     6. Episopde of blurry vision ou    At [facility] 2018 - resolved      PLAN      CSM   Good resp to latanoprost   F/U with //  HVF/Disc Photos - cataract check -  Do os first - better ON and better VF // whenever done it will be complex // VERY shallow AC and very dense lens / on flomax - small pupil  - high risk for corneal edema and other complications

## 2019-04-16 NOTE — PROGRESS NOTES
HPI     Glaucoma      Additional comments: 4 month ck and pt states no changes since last exam                Glare      Additional comments: Pt c/o diffculty with driving at night and glare              Comments     DLSL 12/4/18  -Pt's here with his wife who states that ot occasionally c/o difficulty   seeing clearly. Pt states he does not wear his prescription glasses often   and just uses OTC readers.    1. Hx Narrow Angles OU  2. NS OU  3. Type 2 DM no BDR  4. Hx Tumor/Cancer RLL    MEDS:  Latanoprost QHS OU                Last edited by Nitza Maya MA on 4/16/2019 10:47 AM. (History)            Assessment /Plan     For exam results, see Encounter Report.    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

## 2019-04-22 DIAGNOSIS — M54.9 BACK PAIN, UNSPECIFIED BACK LOCATION, UNSPECIFIED BACK PAIN LATERALITY, UNSPECIFIED CHRONICITY: ICD-10-CM

## 2019-04-22 RX ORDER — HYDROCODONE BITARTRATE AND ACETAMINOPHEN 5; 325 MG/1; MG/1
1 TABLET ORAL EVERY 6 HOURS PRN
Qty: 60 TABLET | Refills: 0 | Status: SHIPPED | OUTPATIENT
Start: 2019-04-22 | End: 2019-05-21 | Stop reason: SDUPTHER

## 2019-04-22 NOTE — TELEPHONE ENCOUNTER
----- Message from Carine Kingston sent at 4/22/2019  8:11 AM CDT -----  Contact: Lida/Spouse/[phone]  Patient is calling for an RX refill or new RX.  Is this a refill or new RX:refill    RX name and strength: HYDROcodone-acetaminophen (NORCO) 5-325 mg per tablet  Directions (copy/paste from chart):    Is this a 30 day or 90 day RX:    Local pharmacy or mail order pharmacy:  Mail pharmacy  Pharmacy name and phone # (copy/paste from chart):   [facility] [place] [address] [phone] (Phone)  [phone] (Fax)  Comments:

## 2019-05-21 DIAGNOSIS — M54.9 BACK PAIN, UNSPECIFIED BACK LOCATION, UNSPECIFIED BACK PAIN LATERALITY, UNSPECIFIED CHRONICITY: ICD-10-CM

## 2019-05-21 RX ORDER — HYDROCODONE BITARTRATE AND ACETAMINOPHEN 5; 325 MG/1; MG/1
1 TABLET ORAL EVERY 6 HOURS PRN
Qty: 60 TABLET | Refills: 0 | Status: SHIPPED | OUTPATIENT
Start: 2019-05-21 | End: 2019-06-21 | Stop reason: SDUPTHER

## 2019-05-21 NOTE — TELEPHONE ENCOUNTER
----- Message from Donavan Hernandez sent at 5/21/2019  8:38 AM CDT -----  Contact: Zaida [phone] Cell  RX request - refill or new RX.  Is this a refill or new RX:  Refill  RX name and strength: HYDROcodone-acetaminophen (NORCO) 5-325 mg per tablet    Pharmacy name and phone #[facility] [place] [address] [facility] [phone] (Phone) [phone] (Fax)    Please call an advise  Thank you

## 2019-05-31 ENCOUNTER — TELEPHONE (OUTPATIENT)
Dept: DERMATOLOGY | Facility: CLINIC | Age: 84
End: 2019-05-31

## 2019-05-31 NOTE — TELEPHONE ENCOUNTER
----- Message from Colleenlily Edmonds sent at 5/31/2019 12:30 PM CDT -----  Contact: Patients wife  Patient Requesting Sooner Appointment.     Reason for sooner appt.: Patient has a few spots of concern on his hand, face, and ear that needs to examined as soon as possible.     Communication Preference: [phone] or [phone]

## 2019-06-06 RX ORDER — PRAVASTATIN SODIUM 10 MG/1
TABLET ORAL
Qty: 90 TABLET | Refills: 3 | Status: SHIPPED | OUTPATIENT
Start: 2019-06-06 | End: 2020-04-29

## 2019-06-06 RX ORDER — HYDROCHLOROTHIAZIDE 25 MG/1
TABLET ORAL
Qty: 90 TABLET | Refills: 3 | Status: SHIPPED | OUTPATIENT
Start: 2019-06-06 | End: 2020-04-29

## 2019-06-21 DIAGNOSIS — M54.9 BACK PAIN, UNSPECIFIED BACK LOCATION, UNSPECIFIED BACK PAIN LATERALITY, UNSPECIFIED CHRONICITY: ICD-10-CM

## 2019-06-21 RX ORDER — HYDROCODONE BITARTRATE AND ACETAMINOPHEN 5; 325 MG/1; MG/1
1 TABLET ORAL EVERY 6 HOURS PRN
Qty: 60 TABLET | Refills: 0 | Status: SHIPPED | OUTPATIENT
Start: 2019-06-21 | End: 2019-07-22 | Stop reason: SDUPTHER

## 2019-06-21 NOTE — TELEPHONE ENCOUNTER
----- Message from Leta Lacy sent at 6/21/2019  8:39 AM CDT -----  Contact: [phone] or [phone]  Type: Rx    Name of medication(s): HYDROcodone-acetaminophen (NORCO) 5-325 mg per tablet    Is this a refill? New rx? Refill     Pharmacy Name, Phone, & Location:[facility] - [place] [address]   [phone] (Phone)  [phone] (Fax)      Comments:please advise, thanks.

## 2019-07-22 DIAGNOSIS — M54.9 BACK PAIN, UNSPECIFIED BACK LOCATION, UNSPECIFIED BACK PAIN LATERALITY, UNSPECIFIED CHRONICITY: ICD-10-CM

## 2019-07-22 RX ORDER — HYDROCODONE BITARTRATE AND ACETAMINOPHEN 5; 325 MG/1; MG/1
1 TABLET ORAL EVERY 6 HOURS PRN
Qty: 60 TABLET | Refills: 0 | Status: SHIPPED | OUTPATIENT
Start: 2019-07-22 | End: 2019-08-22 | Stop reason: SDUPTHER

## 2019-07-22 NOTE — TELEPHONE ENCOUNTER
----- Message from Narciso Higuera sent at 7/22/2019 10:13 AM CDT -----  Contact: Spouse Lida [phone]  Is this a refill or new RX:  Refill    RX name and strength: HYDROcodone-acetaminophen (NORCO) 5-325 mg per tablet  :    Pharmacy name and phone # [facility] [place] - [address] [facility] [phone] (Phone) [phone] (Fax)

## 2019-07-24 ENCOUNTER — TELEPHONE (OUTPATIENT)
Dept: OPHTHALMOLOGY | Facility: CLINIC | Age: 84
End: 2019-07-24

## 2019-08-12 ENCOUNTER — HOSPITAL ENCOUNTER (OUTPATIENT)
Dept: RADIOLOGY | Facility: HOSPITAL | Age: 84
Discharge: HOME | End: 2019-08-12
Attending: PHYSICIAN ASSISTANT
Payer: MEDICARE

## 2019-08-12 ENCOUNTER — OFFICE VISIT (OUTPATIENT)
Dept: ORTHOPEDICS | Facility: CLINIC | Age: 84
End: 2019-08-12
Payer: MEDICARE

## 2019-08-12 ENCOUNTER — OFFICE VISIT (OUTPATIENT)
Dept: INTERNAL MEDICINE | Facility: CLINIC | Age: 84
End: 2019-08-12
Payer: MEDICARE

## 2019-08-12 VITALS
OXYGEN SATURATION: 99 % | DIASTOLIC BLOOD PRESSURE: 75 MMHG | HEIGHT: 66 IN | HEART RATE: 69 BPM | TEMPERATURE: 98 F | SYSTOLIC BLOOD PRESSURE: 130 MMHG | BODY MASS INDEX: 25.3 KG/M2 | WEIGHT: 157.44 LBS

## 2019-08-12 VITALS — BODY MASS INDEX: 25 KG/M2 | HEIGHT: 66 IN | WEIGHT: 155.56 LBS

## 2019-08-12 DIAGNOSIS — M25.571 RIGHT ANKLE PAIN, UNSPECIFIED CHRONICITY: ICD-10-CM

## 2019-08-12 DIAGNOSIS — M25.552 PAIN OF LEFT HIP JOINT: ICD-10-CM

## 2019-08-12 DIAGNOSIS — M79.604 PAIN OF RIGHT LOWER EXTREMITY: ICD-10-CM

## 2019-08-12 DIAGNOSIS — M66.0 RUPTURED BAKERS CYST: ICD-10-CM

## 2019-08-12 DIAGNOSIS — M79.604 RIGHT LEG PAIN: ICD-10-CM

## 2019-08-12 DIAGNOSIS — M17.11 PRIMARY OSTEOARTHRITIS OF RIGHT KNEE: Primary | ICD-10-CM

## 2019-08-12 DIAGNOSIS — M25.561 RIGHT KNEE PAIN, UNSPECIFIED CHRONICITY: ICD-10-CM

## 2019-08-12 DIAGNOSIS — R60.1 GENERALIZED EDEMA: Primary | ICD-10-CM

## 2019-08-12 DIAGNOSIS — R60.1 GENERALIZED EDEMA: ICD-10-CM

## 2019-08-12 PROBLEM — I15.2 HYPERTENSION ASSOCIATED WITH DIABETES: Status: ACTIVE | Noted: 2017-03-10

## 2019-08-12 PROBLEM — E11.59 HYPERTENSION ASSOCIATED WITH DIABETES: Status: ACTIVE | Noted: 2017-03-10

## 2019-08-12 PROCEDURE — 93971 US LOWER EXTREMITY VEINS RIGHT: ICD-10-PCS | Mod: 26,RT,, | Performed by: RADIOLOGY

## 2019-08-12 PROCEDURE — 73502 X-RAY EXAM HIP UNI 2-3 VIEWS: CPT | Mod: 26,RT,, | Performed by: RADIOLOGY

## 2019-08-12 PROCEDURE — 20610 DRAIN/INJ JOINT/BURSA W/O US: CPT | Mod: PBBFAC | Performed by: PHYSICIAN ASSISTANT

## 2019-08-12 PROCEDURE — 73610 X-RAY EXAM OF ANKLE: CPT | Mod: TC,RT

## 2019-08-12 PROCEDURE — 99999 PR PBB SHADOW E&M-EST. PATIENT-LVL V: CPT | Mod: PBBFAC,,, | Performed by: PHYSICIAN ASSISTANT

## 2019-08-12 PROCEDURE — 99999 PR PBB SHADOW E&M-EST. PATIENT-LVL IV: ICD-10-PCS | Mod: PBBFAC,,, | Performed by: PHYSICIAN ASSISTANT

## 2019-08-12 PROCEDURE — 73560 X-RAY EXAM OF KNEE 1 OR 2: CPT | Mod: 26,59,LT, | Performed by: RADIOLOGY

## 2019-08-12 PROCEDURE — 99999 PR PBB SHADOW E&M-EST. PATIENT-LVL IV: CPT | Mod: PBBFAC,,, | Performed by: PHYSICIAN ASSISTANT

## 2019-08-12 PROCEDURE — 73562 X-RAY EXAM OF KNEE 3: CPT | Mod: 26,RT,, | Performed by: RADIOLOGY

## 2019-08-12 PROCEDURE — 99204 PR OFFICE/OUTPT VISIT, NEW, LEVL IV, 45-59 MIN: ICD-10-PCS | Mod: 25,S$PBB,, | Performed by: PHYSICIAN ASSISTANT

## 2019-08-12 PROCEDURE — 73560 XR KNEE ORTHO RIGHT: ICD-10-PCS | Mod: 26,59,LT, | Performed by: RADIOLOGY

## 2019-08-12 PROCEDURE — 73562 X-RAY EXAM OF KNEE 3: CPT | Mod: TC,RT

## 2019-08-12 PROCEDURE — 73502 XR HIP 2 VIEW RIGHT: ICD-10-PCS | Mod: 26,RT,, | Performed by: RADIOLOGY

## 2019-08-12 PROCEDURE — 93971 EXTREMITY STUDY: CPT | Mod: TC,RT

## 2019-08-12 PROCEDURE — 99214 OFFICE O/P EST MOD 30 MIN: CPT | Mod: PBBFAC,25,27 | Performed by: PHYSICIAN ASSISTANT

## 2019-08-12 PROCEDURE — 73502 X-RAY EXAM HIP UNI 2-3 VIEWS: CPT | Mod: TC,RT

## 2019-08-12 PROCEDURE — 73610 XR ANKLE COMPLETE 3 VIEW RIGHT: ICD-10-PCS | Mod: 26,RT,, | Performed by: RADIOLOGY

## 2019-08-12 PROCEDURE — 20610 DRAIN/INJ JOINT/BURSA W/O US: CPT | Mod: S$PBB,RT,, | Performed by: PHYSICIAN ASSISTANT

## 2019-08-12 PROCEDURE — 93971 EXTREMITY STUDY: CPT | Mod: 26,RT,, | Performed by: RADIOLOGY

## 2019-08-12 PROCEDURE — 99204 OFFICE O/P NEW MOD 45 MIN: CPT | Mod: 25,S$PBB,, | Performed by: PHYSICIAN ASSISTANT

## 2019-08-12 PROCEDURE — 73560 X-RAY EXAM OF KNEE 1 OR 2: CPT | Mod: TC,LT

## 2019-08-12 PROCEDURE — 73610 X-RAY EXAM OF ANKLE: CPT | Mod: 26,RT,, | Performed by: RADIOLOGY

## 2019-08-12 PROCEDURE — 20610 PR DRAIN/INJECT LARGE JOINT/BURSA: ICD-10-PCS | Mod: S$PBB,RT,, | Performed by: PHYSICIAN ASSISTANT

## 2019-08-12 PROCEDURE — 99999 PR PBB SHADOW E&M-EST. PATIENT-LVL V: ICD-10-PCS | Mod: PBBFAC,,, | Performed by: PHYSICIAN ASSISTANT

## 2019-08-12 PROCEDURE — 99213 PR OFFICE/OUTPT VISIT, EST, LEVL III, 20-29 MIN: ICD-10-PCS | Mod: S$PBB,,, | Performed by: PHYSICIAN ASSISTANT

## 2019-08-12 PROCEDURE — 99213 OFFICE O/P EST LOW 20 MIN: CPT | Mod: S$PBB,,, | Performed by: PHYSICIAN ASSISTANT

## 2019-08-12 PROCEDURE — 73562 XR KNEE ORTHO RIGHT: ICD-10-PCS | Mod: 26,RT,, | Performed by: RADIOLOGY

## 2019-08-12 PROCEDURE — 99215 OFFICE O/P EST HI 40 MIN: CPT | Mod: PBBFAC | Performed by: PHYSICIAN ASSISTANT

## 2019-08-12 RX ORDER — METHYLPREDNISOLONE ACETATE 80 MG/ML
80 INJECTION, SUSPENSION INTRA-ARTICULAR; INTRALESIONAL; INTRAMUSCULAR; SOFT TISSUE
Status: COMPLETED | OUTPATIENT
Start: 2019-08-12 | End: 2019-08-12

## 2019-08-12 RX ADMIN — METHYLPREDNISOLONE ACETATE 80 MG: 80 INJECTION, SUSPENSION INTRALESIONAL; INTRAMUSCULAR; INTRASYNOVIAL; SOFT TISSUE at 07:08

## 2019-08-12 NOTE — LETTER
August 13, 2019      SWATI Fisher  [address] [place]  [place] [address]           [facility]  [address]  [facility] [address]  Phone: [phone]  Fax: [phone]          Patient: Yogesh Shay   MR Number: [id number]   YOB: 1934   Date of Visit: 8/12/2019       Dear Lizbeth Carmen:    Thank you for referring Yogesh Shay to me for evaluation. Attached you will find relevant portions of my assessment and plan of care.    If you have questions, please do not hesitate to call me. I look forward to following Yogesh Shay along with you.    Sincerely,    Hayley Ross PA-C    Enclosure  CC:  No Recipients    If you would like to receive this communication electronically, please contact [email or web address] or [phone] to request more information on [facility] access.    For providers and/or their staff who would like to refer a patient to [facility], please contact us through our one-stop-shop provider referral line, [facility], at [phone].    If you feel you have received this communication in error or would no longer like to receive these types of communications, please e-mail [email or web address]

## 2019-08-12 NOTE — PROGRESS NOTES
Subjective:       Patient ID: Yogesh Shay is a 85 y.o. male.    Chief Complaint: Leg Swelling (right, 10+ days); Foot Swelling (right); Joint Swelling (right knee); and Hip Pain (right)    Patient presents with a 10 day history of right leg swelling, pain and skin changes.  He denies any fall or injury in the last few weeks but did fall on July 4th and says he fell on his buttocks.  He did not seek any medical attention and states he has been fine.  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  He states the pain starts in the right sciatic area radiates to the groin, and goes down to the foot.  He also complains of swelling of the entire leg, that is worse at the knee and the foot and ankle.  He has started to notice a purplish discoloration and bruising in the heel that is now extending upwards on the leg.  He states the entire leg is tender.  He denies hitting the leg or any other kind of injury.  He has been elevating the leg but with no relief of the swelling. He denies any weakness in the legs or instability of the knee.  He denies any shortness of breath or chest pain. He denies any history of blood clots.    Review of Systems   Constitutional: Negative for activity change, appetite change, fatigue and unexpected weight change.   HENT: Negative for nosebleeds.    Eyes: Negative for pain and visual disturbance.   Respiratory: Negative for chest tightness, shortness of breath and wheezing.    Cardiovascular: Positive for leg swelling. Negative for chest pain and palpitations.   Musculoskeletal: Positive for arthralgias, gait problem, joint swelling and myalgias. Negative for back pain, neck pain and neck stiffness.   Skin: Positive for color change and pallor. Negative for rash and wound.   Neurological: Negative for dizziness, syncope, light-headedness and headaches.       Objective:      Physical Exam   Constitutional: He is oriented to person,  place, and time. He appears well-developed and well-nourished. No distress.   HENT:   Head: Normocephalic and atraumatic.   Eyes: Pupils are equal, round, and reactive to light. Conjunctivae are normal.   Neck: Normal range of motion. Neck supple. No JVD present. No thyromegaly present.   Cardiovascular: Normal rate and regular rhythm. Exam reveals no gallop and no friction rub.   No murmur heard.  Pulmonary/Chest: Effort normal. No respiratory distress. He has no wheezes. He has no rales. He exhibits no tenderness.   Musculoskeletal:        Right hip: He exhibits normal range of motion, normal strength, no tenderness, no bony tenderness, no swelling and no deformity.        Right knee: He exhibits swelling and effusion. He exhibits normal range of motion, no LCL laxity and no MCL laxity. Tenderness found. Medial joint line and lateral joint line tenderness noted.        Right ankle: He exhibits decreased range of motion, swelling and ecchymosis. No tenderness. Achilles tendon normal.        Lumbar back: He exhibits tenderness. He exhibits normal range of motion, no bony tenderness, no swelling, no edema, no deformity, no pain and no spasm.        Back:    There is ecchymosis surrounding the right heel that extends up to the ankle.   Neurological: He is alert and oriented to person, place, and time.   Skin: He is not diaphoretic.       Assessment:       1. Generalized edema    2. Pain of left hip joint    3. Pain of right lower extremity        Plan:       Yogesh was seen today for leg swelling, foot swelling, joint swelling and hip pain.    Diagnoses and all orders for this visit:    Generalized edema  -     Cancel: US Lower Extremity Veins Left; Future  -     US Lower Extremity Veins Right; Future    Pain of left hip joint  -     Cancel: X-Ray Hip 4 or more views Left; Future  -     X-Ray Hip 4 or more views Right; Future    Pain of right lower extremity  -     X-Ray Ankle Complete 3 View Right; Future     Will  call patient with results and further recommendations.

## 2019-08-13 NOTE — PROGRESS NOTES
Subjective:      Patient ID: Yogesh Shay is a 85 y.o. male.    Chief Complaint: Pain of the Right Knee    HPI  85 year old male presents with chief complaint of right knee pain x 10 days. He denies trauma. He says the pain started at the posterior ankle and it has gone up his leg. The majority of his pain is at the posterior knee and calf. Sometimes the pain goes up to the buttock and lower back. His pain is worse with walking. Sitting is better. He takes diclofenac BID with no relief. Norco gives mild relief. He has some swelling and bruising at the ankle. No fever, chills, or sweats. He had left TKA about 10 years ago by Dr. [facility]. He has had to use a walking stick recently. He has h/o LBP. U/S was done earlier which showed findings consistent with hematoma vs ruptured baker's cyst.   Review of Systems   Constitution: Negative for chills, fever and night sweats.   Cardiovascular: Negative for chest pain.   Respiratory: Negative for cough and shortness of breath.    Hematologic/Lymphatic: Does not bruise/bleed easily.   Gastrointestinal: Negative for heartburn.   Genitourinary: Negative for dysuria.   Neurological: Negative for numbness and paresthesias.   Psychiatric/Behavioral: Negative for altered mental status.   Allergic/Immunologic: Negative for persistent infections.         Objective:            General    Vitals reviewed.  Constitutional: He is oriented to person, place, and time. He appears well-developed and well-nourished.   HENT:   Head: Atraumatic.   Cardiovascular: Normal rate.    Pulmonary/Chest: Effort normal.   Neurological: He is alert and oriented to person, place, and time.   Psychiatric: He has a normal mood and affect.         Right Ankle/Foot Exam     Inspection   Erythema: absent  Bruising: Ankle - present     Range of Motion   The patient has normal right ankle ROM.  Mckeon Test:  negative    Tests   Anterior drawer: negative  Varus tilt: negative    Other   Sensation:  normal    Comments:  Mild swelling at ankle. TTP at the posterior ankle- achilles insertion and tendon and TTP at the medial and lateral ankle.         Vascular Exam     Right Pulses  Dorsalis Pedis:      2+            Right Knee Exam:  ROM 0-110 degrees  No effusion  +crepitus  TTP medial and lateral joint line and calf      X-ray knee: ordered and reviewed by myself. Advanced tricompartmental degenerative change again identified with severe joint space loss articular sclerosis and marginal osteophyte formation.  There is slight medial positioning of the distal femur with respect to the tibia.  There is no significant genu deformity bilaterally.  Remote operative change with left total knee arthroplasty.    X-ray ankle: reviewed by myself. Mild DJD.  No fracture or dislocation.  No bone destruction identified.      Assessment:       Encounter Diagnoses   Name Primary?    Primary osteoarthritis of right knee Yes    Right ankle pain, unspecified chronicity     Right leg pain           Plan:       Discussed treatment options with patient. He would like to try a knee injection. Elevate leg and alternate ice, heat. RTC if symptoms worsen or do not improve.     PROCEDURE:  I have explained the risks, benefits, and alternatives of the procedure in detail.  The patient voices understanding and all questions have been answered.  The patient agrees to proceed as planned. So after I performed a sterile prep of the skin in the normal fashion the right knee is injected using a 22 gauge needle from the anteromedial approach with a combination of 4cc 1% plain lidocaine and 80 mg of depo medrol.  The patient is cautioned and immediate relief of pain is secondary to the local anesthetic and will be temporary.  After the anesthetic wears off there may be a increase in pain that may last for a few hours or a few days and they should use ice to help alleviate this flair up of pain.

## 2019-08-14 NOTE — PROGRESS NOTES
HPI     DLS: 4/16/19    Pt here for HVF review;  Pt states at times he's eyes burn and get irritated.     Meds: Latanoprost QHS OU     1. Hx Narrow Angles OU   2. NS OU   3. Type 2 DM no BDR   4. Hx Tumor/Cancer RLL     Last edited by Ashli Salazar on 8/16/2019 10:57 AM. (History)            Assessment /Plan     For exam results, see Encounter Report.    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        Glaucoma meds    Latanoprost ou - started 6/15/2018         H/O adverse rxn to glaucoma drops    none        LASERS    Laser PI's 4/2018 - od // OS 5/2018         GLAUCOMA SURGERIES    none        OTHER EYE SURGERIES    none        CDR    0.7/0.4         Tbase    19-27  od // 22-40 os         Tmax     27/40          Ttarget    ?             HVF    2 test 2018 to  2019 - unreliable  od // unreliable  Os (improved ou)         Gonio    +3 w/ steep approach - post PI's ou         CCT    518 / 528        OCT    1 test 2018 to 2018 - RNFL - bord T od // bord TS os        HRT    1 test 2018 to 2018 - MR - high std dev. od // high std dev os        Disc photos    6/2018     - Ttoday    15/18  (on latanoprost - ( down from 26/40))  - Test done today    HVF // DFE     2. 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 Diabetes    No BDR seen on exam 6/15/2018     4. H/O tumor / cancer RLL    S/P removal and re-construction at [facility]    The biopsy was done by amanda - but the surgery was done by some one else     5. Has some abdominal issues    Having surgery next week - 3/15/2018     6. Episopde of blurry vision ou    At [facility] 2018 - resolved      PLAN      CSM   Good resp to latanoprost   F/U with //  HRT (attempt - has dense cataract) // AR/MR/BAT - cataract check     - whenever done it will be complex // VERY shallow AC and very dense lens / on flomax - small pupil  - high risk for corneal edema and other complications // both cataracts are similar -

## 2019-08-16 ENCOUNTER — CLINICAL SUPPORT (OUTPATIENT)
Dept: OPHTHALMOLOGY | Facility: CLINIC | Age: 84
End: 2019-08-16
Payer: MEDICARE

## 2019-08-16 ENCOUNTER — OFFICE VISIT (OUTPATIENT)
Dept: OPHTHALMOLOGY | Facility: CLINIC | Age: 84
End: 2019-08-16
Payer: MEDICARE

## 2019-08-16 DIAGNOSIS — T44.6X5A TAMSULOSIN-ASSOCIATED INTRAOPERATIVE FLOPPY IRIS SYNDROME (IFIS): ICD-10-CM

## 2019-08-16 DIAGNOSIS — H57.03 SMALL PUPILS: ICD-10-CM

## 2019-08-16 DIAGNOSIS — H40.053 OCULAR HYPERTENSION, BILATERAL: ICD-10-CM

## 2019-08-16 DIAGNOSIS — H40.033 ANATOMICAL NARROW ANGLE BORDERLINE GLAUCOMA OF BOTH EYES: ICD-10-CM

## 2019-08-16 DIAGNOSIS — Z98.890 S/P LASER IRIDOTOMY: ICD-10-CM

## 2019-08-16 DIAGNOSIS — H40.89 GLAUCOMA DUE TO COMBINATION OF MECHANISMS: ICD-10-CM

## 2019-08-16 DIAGNOSIS — H21.81 TAMSULOSIN-ASSOCIATED INTRAOPERATIVE FLOPPY IRIS SYNDROME (IFIS): ICD-10-CM

## 2019-08-16 DIAGNOSIS — H25.13 NUCLEAR SENILE CATARACT OF BOTH EYES: ICD-10-CM

## 2019-08-16 DIAGNOSIS — H40.023 OPEN ANGLE WITH BORDERLINE FINDINGS AND HIGH GLAUCOMA RISK IN BOTH EYES: ICD-10-CM

## 2019-08-16 DIAGNOSIS — H40.033 ANATOMICAL NARROW ANGLE BORDERLINE GLAUCOMA OF BOTH EYES: Primary | ICD-10-CM

## 2019-08-16 DIAGNOSIS — H40.033 ANATOMICAL NARROW ANGLE OF BOTH EYES: ICD-10-CM

## 2019-08-16 PROCEDURE — 92083 EXTENDED VISUAL FIELD XM: CPT | Mod: 26,S$PBB,, | Performed by: OPHTHALMOLOGY

## 2019-08-16 PROCEDURE — 92083 HUMPHREY VISUAL FIELD - OU - BOTH EYES: ICD-10-PCS | Mod: 26,S$PBB,, | Performed by: OPHTHALMOLOGY

## 2019-08-16 PROCEDURE — 92014 PR EYE EXAM, EST PATIENT,COMPREHESV: ICD-10-PCS | Mod: S$PBB,,, | Performed by: OPHTHALMOLOGY

## 2019-08-16 PROCEDURE — 99212 OFFICE O/P EST SF 10 MIN: CPT | Mod: PBBFAC | Performed by: OPHTHALMOLOGY

## 2019-08-16 PROCEDURE — 92083 EXTENDED VISUAL FIELD XM: CPT | Mod: PBBFAC

## 2019-08-16 PROCEDURE — 99999 PR PBB SHADOW E&M-EST. PATIENT-LVL II: CPT | Mod: PBBFAC,,, | Performed by: OPHTHALMOLOGY

## 2019-08-16 PROCEDURE — 92014 COMPRE OPH EXAM EST PT 1/>: CPT | Mod: S$PBB,,, | Performed by: OPHTHALMOLOGY

## 2019-08-16 PROCEDURE — 99999 PR PBB SHADOW E&M-EST. PATIENT-LVL II: ICD-10-PCS | Mod: PBBFAC,,, | Performed by: OPHTHALMOLOGY

## 2019-08-16 RX ORDER — LATANOPROST 50 UG/ML
1 SOLUTION/ DROPS OPHTHALMIC DAILY
Qty: 2.5 ML | Refills: 12 | Status: SHIPPED | OUTPATIENT
Start: 2019-08-16 | End: 2020-09-09 | Stop reason: SDUPTHER

## 2019-08-16 NOTE — PROGRESS NOTES
hvf done;rel/fix poor ou coop. Fair /eye and body movements ou/chart checked for latex allergy.-[facility]

## 2019-08-22 DIAGNOSIS — M54.9 BACK PAIN, UNSPECIFIED BACK LOCATION, UNSPECIFIED BACK PAIN LATERALITY, UNSPECIFIED CHRONICITY: ICD-10-CM

## 2019-08-22 RX ORDER — HYDROCODONE BITARTRATE AND ACETAMINOPHEN 5; 325 MG/1; MG/1
1 TABLET ORAL EVERY 6 HOURS PRN
Qty: 60 TABLET | Refills: 0 | Status: SHIPPED | OUTPATIENT
Start: 2019-08-22 | End: 2019-08-22 | Stop reason: SDUPTHER

## 2019-08-22 NOTE — TELEPHONE ENCOUNTER
----- Message from Carine Kingston sent at 8/22/2019 10:38 AM CDT -----  Contact: self/[phone]  Patient is calling for an RX refill or new RX.  Is this a refill or new RX:  refill  RX name and strength:HYDROcodone-acetaminophen (NORCO) 5-325 mg per tablet Directions (copy/paste from chart):    Is this a 30 day or 90 day RX:    Local pharmacy or mail order pharmacy:  Local pharmacy  Pharmacy name and phone # (copy/paste from chart):  [facility] [place] [address] [phone] (Phone)   [phone] (Fax)  Comments:

## 2019-08-22 NOTE — TELEPHONE ENCOUNTER
----- Message from Carine Kingston sent at 8/22/2019  1:49 PM CDT -----  Contact: Murphy/Christine/[phone]  Murphy with [facility] called in regards needing for PCP to rewrite the rx for HYDROcodone-acetaminophen (NORCO) 5-325 mg per tablet . Stating that rx need special words. [facility] - [place] - [address] [facility] [phone] (Phone) [phone] (Fax) Please call and advise. Thank you.

## 2019-08-23 RX ORDER — HYDROCODONE BITARTRATE AND ACETAMINOPHEN 5; 325 MG/1; MG/1
1 TABLET ORAL EVERY 6 HOURS PRN
Qty: 60 TABLET | Refills: 0 | Status: SHIPPED | OUTPATIENT
Start: 2019-08-23 | End: 2019-08-24 | Stop reason: SDUPTHER

## 2019-08-24 DIAGNOSIS — M54.9 BACK PAIN, UNSPECIFIED BACK LOCATION, UNSPECIFIED BACK PAIN LATERALITY, UNSPECIFIED CHRONICITY: ICD-10-CM

## 2019-08-24 NOTE — TELEPHONE ENCOUNTER
----- Message from Chad Montoya sent at 8/24/2019 11:06 AM CDT -----  Contact: Wife [phone]  Patient would like the doctor to call in a perscription for HYDROcodone-acetaminophen (NORCO) 5-325 mg per tablet, Greater than 7 days medically necessary

## 2019-08-26 ENCOUNTER — TELEPHONE (OUTPATIENT)
Dept: INTERNAL MEDICINE | Facility: CLINIC | Age: 84
End: 2019-08-26

## 2019-08-26 RX ORDER — HYDROCODONE BITARTRATE AND ACETAMINOPHEN 5; 325 MG/1; MG/1
1 TABLET ORAL EVERY 6 HOURS PRN
Qty: 60 TABLET | Refills: 0 | Status: SHIPPED | OUTPATIENT
Start: 2019-08-26 | End: 2019-08-27 | Stop reason: SDUPTHER

## 2019-08-26 NOTE — TELEPHONE ENCOUNTER
----- Message from Chad Montoya sent at 8/26/2019  9:41 AM CDT -----  Contact: Lida Wife [phone]  Patient wife will like an follow up on the medication refill, please advise.

## 2019-08-27 DIAGNOSIS — M54.9 BACK PAIN, UNSPECIFIED BACK LOCATION, UNSPECIFIED BACK PAIN LATERALITY, UNSPECIFIED CHRONICITY: ICD-10-CM

## 2019-08-27 RX ORDER — HYDROCODONE BITARTRATE AND ACETAMINOPHEN 5; 325 MG/1; MG/1
1 TABLET ORAL EVERY 6 HOURS PRN
Qty: 60 TABLET | Refills: 0 | OUTPATIENT
Start: 2019-08-27

## 2019-08-27 RX ORDER — HYDROCODONE BITARTRATE AND ACETAMINOPHEN 5; 325 MG/1; MG/1
1 TABLET ORAL EVERY 6 HOURS PRN
Qty: 60 TABLET | Refills: 0 | Status: SHIPPED | OUTPATIENT
Start: 2019-08-27 | End: 2019-10-02 | Stop reason: SDUPTHER

## 2019-08-27 NOTE — TELEPHONE ENCOUNTER
"Please resend rx with "medically necessary for use greater than seven days"  New rx pended, please advise.  "

## 2019-08-27 NOTE — TELEPHONE ENCOUNTER
"----- Message from Narciso Higuera sent at 8/27/2019 10:35 AM CDT -----  Contact: [facility]/ Keshia [phone]  Keshia with [facility] called in regards needing for PCP to rewrite the rx for HYDROcodone-acetaminophen (NORCO) 5-325 mg per tablet . Stating that rx need special words. [facility] [place] [address]  [phone] (Phone) [phone] (Fax) Please call and advise. Thank you.    Please resend rx with "medically necessary for use greater than seven days    Comments: She checked and didn't see anything transmitted on 8/26/19  "

## 2019-08-27 NOTE — TELEPHONE ENCOUNTER
----- Message from Tamika Phelps sent at 8/27/2019  1:58 PM CDT -----  Contact: self   Patient is calling for an RX refill or new RX.  Is this a refill or new RX:  new  RX name and strength: HYDROcodone-acetaminophen (NORCO) 5-325 mg per tablet  Directions (copy/paste from chart):    Is this a 30 day or 90 day RX:    Local pharmacy or mail order pharmacy:    Pharmacy name and phone # (copy/paste from chart):     Comments:  Script needs to state patient need for more then 7 days

## 2019-08-27 NOTE — TELEPHONE ENCOUNTER
----- Message from Christa Purcell sent at 8/27/2019 10:44 AM CDT -----  Contact: wife/karine/[phone]  Pt wife called in regards to checking the status of a Rx refill request for     Hydrocodone-acetaminophen (NORCO) 5-325 mg per tablet 60 tablet 0 8/26/2019 No Take 1 tablet by mouth every 6 (six) hours as needed for Pain.    The pharmacy said that the drug store sent the Rx request  back to the dr in regards to getting more information.  pt is out of his med's and would like to get it ASAP.     [facility]  [phone]  Please advise

## 2019-09-12 DIAGNOSIS — R10.13 CONTINUOUS EPIGASTRIC PAIN: ICD-10-CM

## 2019-09-12 RX ORDER — ONDANSETRON 8 MG/1
TABLET, ORALLY DISINTEGRATING ORAL
Qty: 30 TABLET | Refills: 0 | Status: SHIPPED | OUTPATIENT
Start: 2019-09-12 | End: 2022-03-21 | Stop reason: SDUPTHER

## 2019-09-18 ENCOUNTER — TELEPHONE (OUTPATIENT)
Dept: INTERNAL MEDICINE | Facility: CLINIC | Age: 84
End: 2019-09-18

## 2019-09-18 NOTE — TELEPHONE ENCOUNTER
----- Message from Nella Huffman sent at 9/18/2019  8:53 AM CDT -----  Contact: Wife, Lida     [phone]  They are going out of town for two weeks. Need his Rx prior to leaving      Patient is calling for an RX refill or new RX.  Is this a refill or new RX:  Refill  RX name and strength: HYDROcodone-acetaminophen (NORCO) 5-325 mg per tablet  Directions (copy/paste from chart):Take 1 tablet by mouth every 6 (six) hours as needed for Pain. Medically necessary to fill greater than 7 day supply - Oral   Is this a 30 day or 90 day RX:  30  Local pharmacy or mail order pharmacy:  Local   Pharmacy name and phone # (copy/paste from chart): [facility]   [phone] (Phone)  [phone] (Fax)  Comments:

## 2019-09-18 NOTE — TELEPHONE ENCOUNTER
Spoke with pharmacy and was told they do not do vac overrides on controled medications. Patient notified.

## 2019-09-29 ENCOUNTER — TELEPHONE (OUTPATIENT)
Dept: INTERNAL MEDICINE | Facility: CLINIC | Age: 84
End: 2019-09-29

## 2019-09-30 RX ORDER — ATENOLOL 50 MG/1
TABLET ORAL
Qty: 270 TABLET | Refills: 4 | Status: SHIPPED | OUTPATIENT
Start: 2019-09-30 | End: 2020-12-29

## 2019-09-30 RX ORDER — TAMSULOSIN HYDROCHLORIDE 0.4 MG/1
CAPSULE ORAL
Qty: 90 CAPSULE | Refills: 4 | Status: SHIPPED | OUTPATIENT
Start: 2019-09-30 | End: 2020-12-29

## 2019-09-30 NOTE — TELEPHONE ENCOUNTER
----- Message from Yvon Yeager sent at 9/30/2019 11:18 AM CDT -----  Contact: wife/[phone]  Type: Rx    Name of medication(s): HYDROcodone-acetaminophen (NORCO) 5-325 mg per tablet    Is this a refill? New rx? Refill     Who prescribed medication? Dr. Root     Pharmacy Name, Phone, & Location:[facility] - [place]  [place]    Comments: Please call and advise.        Thank You

## 2019-10-02 DIAGNOSIS — M54.9 BACK PAIN, UNSPECIFIED BACK LOCATION, UNSPECIFIED BACK PAIN LATERALITY, UNSPECIFIED CHRONICITY: ICD-10-CM

## 2019-10-02 RX ORDER — HYDROCODONE BITARTRATE AND ACETAMINOPHEN 5; 325 MG/1; MG/1
1 TABLET ORAL EVERY 6 HOURS PRN
Qty: 60 TABLET | Refills: 0 | Status: SHIPPED | OUTPATIENT
Start: 2019-10-02 | End: 2019-10-31 | Stop reason: SDUPTHER

## 2019-10-02 NOTE — TELEPHONE ENCOUNTER
----- Message from Beata Ray sent at 10/1/2019  4:47 PM CDT -----  Contact: Lida(wife) [phone]  Patient is calling for an RX refill or new RX.  Is this a refill or new RX:  refill  RX name and strength: HYDROcodone-acetaminophen (NORCO) 5-325 mg per tablet  Directions (copy/paste from chart):  Take 1 tablet by mouth every 6 (six) hours as needed for Pain. Medically necessary to fill greater than 7 day supply - Oral  Is this a 30 day or 90 day RX:  30  Local pharmacy or mail order pharmacy:  local  Pharmacy name and phone # (copy/paste from chart):   [facility] [place] [address] [phone] (Phone)  [phone] (Fax)  Comments:

## 2019-10-31 DIAGNOSIS — M54.9 BACK PAIN, UNSPECIFIED BACK LOCATION, UNSPECIFIED BACK PAIN LATERALITY, UNSPECIFIED CHRONICITY: ICD-10-CM

## 2019-10-31 RX ORDER — HYDROCODONE BITARTRATE AND ACETAMINOPHEN 5; 325 MG/1; MG/1
1 TABLET ORAL EVERY 6 HOURS PRN
Qty: 60 TABLET | Refills: 0 | Status: SHIPPED | OUTPATIENT
Start: 2019-10-31 | End: 2019-11-25 | Stop reason: SDUPTHER

## 2019-10-31 NOTE — TELEPHONE ENCOUNTER
----- Message from Leta Lcay sent at 10/31/2019  9:31 AM CDT -----  Contact: [phone]  Type: Rx    Name of medication(s): HYDROcodone-acetaminophen (NORCO) 5-325 mg per tablet    Is this a refill? New rx? Refill     Pharmacy Name, Phone, & Location:[facility] - [place]  [place] [address]   [phone] (Phone)  [phone] (Fax)      Comments:please advise, thanks

## 2019-11-25 DIAGNOSIS — M54.9 BACK PAIN, UNSPECIFIED BACK LOCATION, UNSPECIFIED BACK PAIN LATERALITY, UNSPECIFIED CHRONICITY: ICD-10-CM

## 2019-11-25 RX ORDER — HYDROCODONE BITARTRATE AND ACETAMINOPHEN 5; 325 MG/1; MG/1
1 TABLET ORAL EVERY 6 HOURS PRN
Qty: 60 TABLET | Refills: 0 | Status: SHIPPED | OUTPATIENT
Start: 2019-11-25 | End: 2019-12-23 | Stop reason: SDUPTHER

## 2019-11-25 NOTE — TELEPHONE ENCOUNTER
----- Message from Ashwini Gaytan sent at 11/25/2019  9:31 AM CST -----  Contact: Patient [phone]  Patient is calling for an RX refill or new RX.  Is this a refill or new RX:  refill  RX name and strength: HYDROcodone-acetaminophen (NORCO) 5-325 mg per tablet  Directions (copy/paste from chart):  Take 1 tablet by mouth every 6 (six) hours as needed for Pain. Medically necessary to fill greater than 7 day supply - Oral  Is this a 30 day or 90 day RX:  30  Local pharmacy or mail order pharmacy:  local  Pharmacy name and phone # [facility] [place] - [place], [address] [phone] (Phone) [phone] (Fax)    Comments:      Patient would like to know if it is necessary for patient to have blood work done before next appointment.  Request call back.    Please call and advise  Thank you

## 2019-11-25 NOTE — TELEPHONE ENCOUNTER
----- Message from Ashwini Gaytan sent at 11/25/2019  9:31 AM CST -----  Contact: Patient [phone]  Patient is calling for an RX refill or new RX.  Is this a refill or new RX:  refill  RX name and strength: HYDROcodone-acetaminophen (NORCO) 5-325 mg per tablet  Directions (copy/paste from chart):  Take 1 tablet by mouth every 6 (six) hours as needed for Pain. Medically necessary to fill greater than 7 day supply - Oral  Is this a 30 day or 90 day RX:  30  Local pharmacy or mail order pharmacy:  local  Pharmacy name and phone # [facility] [place] - [place], [address] [phone] (Phone) [phone] (Fax)    Comments:      Patient would like to know if it is necessary for patient to have blood work done before next appointment.  Request call back.    Please call and advise  Thank you

## 2019-12-06 ENCOUNTER — PATIENT OUTREACH (OUTPATIENT)
Dept: ADMINISTRATIVE | Facility: OTHER | Age: 84
End: 2019-12-06

## 2019-12-09 ENCOUNTER — OFFICE VISIT (OUTPATIENT)
Dept: OPHTHALMOLOGY | Facility: CLINIC | Age: 84
End: 2019-12-09
Payer: MEDICARE

## 2019-12-09 ENCOUNTER — LAB VISIT (OUTPATIENT)
Dept: LAB | Facility: HOSPITAL | Age: 84
End: 2019-12-09
Payer: MEDICARE

## 2019-12-09 DIAGNOSIS — H25.13 NUCLEAR SENILE CATARACT OF BOTH EYES: ICD-10-CM

## 2019-12-09 DIAGNOSIS — T44.6X5A TAMSULOSIN-ASSOCIATED INTRAOPERATIVE FLOPPY IRIS SYNDROME (IFIS): ICD-10-CM

## 2019-12-09 DIAGNOSIS — H40.033 ANATOMICAL NARROW ANGLE BORDERLINE GLAUCOMA OF BOTH EYES: ICD-10-CM

## 2019-12-09 DIAGNOSIS — H57.03 SMALL PUPILS: ICD-10-CM

## 2019-12-09 DIAGNOSIS — H40.89 GLAUCOMA DUE TO COMBINATION OF MECHANISMS: Primary | ICD-10-CM

## 2019-12-09 DIAGNOSIS — H40.053 OCULAR HYPERTENSION, BILATERAL: ICD-10-CM

## 2019-12-09 DIAGNOSIS — H21.81 TAMSULOSIN-ASSOCIATED INTRAOPERATIVE FLOPPY IRIS SYNDROME (IFIS): ICD-10-CM

## 2019-12-09 DIAGNOSIS — Z98.890 S/P LASER IRIDOTOMY: ICD-10-CM

## 2019-12-09 DIAGNOSIS — E11.9 TYPE 2 DIABETES MELLITUS WITHOUT OPHTHALMIC MANIFESTATIONS: ICD-10-CM

## 2019-12-09 DIAGNOSIS — E11.42 CONTROLLED TYPE 2 DIABETES MELLITUS WITH DIABETIC POLYNEUROPATHY, WITHOUT LONG-TERM CURRENT USE OF INSULIN: ICD-10-CM

## 2019-12-09 LAB
ANION GAP SERPL CALC-SCNC: 10 MMOL/L (ref 8–16)
BUN SERPL-MCNC: 16 MG/DL (ref 8–23)
CALCIUM SERPL-MCNC: 9.8 MG/DL (ref 8.7–10.5)
CHLORIDE SERPL-SCNC: 96 MMOL/L (ref 95–110)
CO2 SERPL-SCNC: 27 MMOL/L (ref 23–29)
CREAT SERPL-MCNC: 0.9 MG/DL (ref 0.5–1.4)
EST. GFR  (AFRICAN AMERICAN): >60 ML/MIN/1.73 M^2
EST. GFR  (NON AFRICAN AMERICAN): >60 ML/MIN/1.73 M^2
ESTIMATED AVG GLUCOSE: 114 MG/DL (ref 68–131)
GLUCOSE SERPL-MCNC: 109 MG/DL (ref 70–110)
HBA1C MFR BLD HPLC: 5.6 % (ref 4–5.6)
POTASSIUM SERPL-SCNC: 4.9 MMOL/L (ref 3.5–5.1)
SODIUM SERPL-SCNC: 133 MMOL/L (ref 136–145)

## 2019-12-09 PROCEDURE — 92133 CPTRZD OPH DX IMG PST SGM ON: CPT | Mod: PBBFAC | Performed by: OPHTHALMOLOGY

## 2019-12-09 PROCEDURE — 99999 PR PBB SHADOW E&M-EST. PATIENT-LVL II: CPT | Mod: PBBFAC,,, | Performed by: OPHTHALMOLOGY

## 2019-12-09 PROCEDURE — 83036 HEMOGLOBIN GLYCOSYLATED A1C: CPT

## 2019-12-09 PROCEDURE — 80048 BASIC METABOLIC PNL TOTAL CA: CPT

## 2019-12-09 PROCEDURE — 92012 INTRM OPH EXAM EST PATIENT: CPT | Mod: S$PBB,,, | Performed by: OPHTHALMOLOGY

## 2019-12-09 PROCEDURE — 92012 PR EYE EXAM, EST PATIENT,INTERMED: ICD-10-PCS | Mod: S$PBB,,, | Performed by: OPHTHALMOLOGY

## 2019-12-09 PROCEDURE — 99999 PR PBB SHADOW E&M-EST. PATIENT-LVL II: ICD-10-PCS | Mod: PBBFAC,,, | Performed by: OPHTHALMOLOGY

## 2019-12-09 PROCEDURE — 99212 OFFICE O/P EST SF 10 MIN: CPT | Mod: PBBFAC,25 | Performed by: OPHTHALMOLOGY

## 2019-12-09 PROCEDURE — 92133 HEIDELBERG RETINA TOMOGRAPHY (HRT) - OU - BOTH EYES: ICD-10-PCS | Mod: 26,S$PBB,, | Performed by: OPHTHALMOLOGY

## 2019-12-09 PROCEDURE — 36415 COLL VENOUS BLD VENIPUNCTURE: CPT

## 2019-12-09 NOTE — PROGRESS NOTES
HPI     DLS: 8/16/19    Pt here for HRT review;  Pt states OD keep tearing up.     Meds: Latanoprost QHS OU     1. MMG   2. Hx Narrow Angles OU   3. NS OU   4. Type 2 DM no BDR   5. Hx Tumor/Cancer RLL     Last edited by Danika Romero MD on 12/9/2019  9:40 AM. (History)          Assessment /Plan     For exam results, see Encounter Report.    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        Glaucoma meds    Latanoprost ou - started 6/15/2018         H/O adverse rxn to glaucoma drops    none        LASERS    Laser PI's 4/2018 - od // OS 5/2018         GLAUCOMA SURGERIES    none        OTHER EYE SURGERIES    none        CDR    0.7/0.4         Tbase    19-27  od // 22-40 os         Tmax     27/40          Ttarget    ?             HVF    2 test 2018 to  2019 - unreliable  od // unreliable  Os (improved ou)         Gonio    +3 w/ steep approach - post PI's ou         CCT    518 / 528        OCT    1 test 2018 to 2018 - RNFL - bord T od // bord TS os        HRT    2 test 2018 to 2019 - MR - high std dev. od // high std dev os        Disc photos    6/2018     - Ttoday    4/15  (on latanoprost - ( down from 26/40))  - Test done today   HRT     2. 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 Diabetes    No BDR seen on exam 6/15/2018     4. H/O tumor / cancer RLL    S/P removal and re-construction at [facility]    The biopsy was done by amanda - but the surgery was done by some one else     5. Has some abdominal issues    Having surgery next week - 3/15/2018     6. Episopde of blurry vision ou    At [facility] 2018 - resolved      PLAN      CSM   Good resp to latanoprost   F/U with - OCT - RNFL / mac and DILATE - may want CE - od - march or April of 2020    - whenever done it will be complex // VERY shallow AC and very dense lens / on flomax - small pupil  - high risk for corneal edema and other complications // both cataracts are similar -

## 2019-12-18 ENCOUNTER — CLINICAL SUPPORT (OUTPATIENT)
Dept: INTERNAL MEDICINE | Facility: CLINIC | Age: 84
End: 2019-12-18
Payer: MEDICARE

## 2019-12-18 ENCOUNTER — OFFICE VISIT (OUTPATIENT)
Dept: INTERNAL MEDICINE | Facility: CLINIC | Age: 84
End: 2019-12-18
Payer: MEDICARE

## 2019-12-18 ENCOUNTER — IMMUNIZATION (OUTPATIENT)
Dept: PHARMACY | Facility: CLINIC | Age: 84
End: 2019-12-18
Payer: MEDICARE

## 2019-12-18 ENCOUNTER — HOSPITAL ENCOUNTER (OUTPATIENT)
Dept: RADIOLOGY | Facility: HOSPITAL | Age: 84
Discharge: HOME | End: 2019-12-18
Attending: INTERNAL MEDICINE
Payer: MEDICARE

## 2019-12-18 VITALS
SYSTOLIC BLOOD PRESSURE: 144 MMHG | BODY MASS INDEX: 25.12 KG/M2 | HEIGHT: 66 IN | WEIGHT: 156.31 LBS | DIASTOLIC BLOOD PRESSURE: 100 MMHG | HEART RATE: 60 BPM

## 2019-12-18 DIAGNOSIS — G89.29 CHRONIC MIDLINE LOW BACK PAIN WITHOUT SCIATICA: ICD-10-CM

## 2019-12-18 DIAGNOSIS — M54.50 CHRONIC MIDLINE LOW BACK PAIN WITHOUT SCIATICA: ICD-10-CM

## 2019-12-18 DIAGNOSIS — E11.42 CONTROLLED TYPE 2 DIABETES MELLITUS WITH DIABETIC POLYNEUROPATHY, WITHOUT LONG-TERM CURRENT USE OF INSULIN: Primary | ICD-10-CM

## 2019-12-18 DIAGNOSIS — I70.0 ATHEROSCLEROSIS OF AORTA: ICD-10-CM

## 2019-12-18 DIAGNOSIS — I15.2 HYPERTENSION ASSOCIATED WITH DIABETES: ICD-10-CM

## 2019-12-18 DIAGNOSIS — M53.2X6 SPINAL INSTABILITY OF LUMBAR REGION: ICD-10-CM

## 2019-12-18 DIAGNOSIS — L30.9 ECZEMA, UNSPECIFIED TYPE: ICD-10-CM

## 2019-12-18 DIAGNOSIS — E11.59 HYPERTENSION ASSOCIATED WITH DIABETES: ICD-10-CM

## 2019-12-18 DIAGNOSIS — M17.0 PRIMARY OSTEOARTHRITIS OF BOTH KNEES: ICD-10-CM

## 2019-12-18 DIAGNOSIS — M54.16 LEFT LUMBAR RADICULOPATHY: ICD-10-CM

## 2019-12-18 PROCEDURE — 72120 X-RAY BEND ONLY L-S SPINE: CPT | Mod: 26,,, | Performed by: RADIOLOGY

## 2019-12-18 PROCEDURE — 1125F PR PAIN SEVERITY QUANTIFIED, PAIN PRESENT: ICD-10-PCS | Mod: ,,, | Performed by: INTERNAL MEDICINE

## 2019-12-18 PROCEDURE — 72120 XR LUMBAR SPINE AP AND LAT WITH FLEX/EXT: ICD-10-PCS | Mod: 26,,, | Performed by: RADIOLOGY

## 2019-12-18 PROCEDURE — 90662 IIV NO PRSV INCREASED AG IM: CPT | Mod: PBBFAC

## 2019-12-18 PROCEDURE — 99999 PR PBB SHADOW E&M-EST. PATIENT-LVL I: CPT | Mod: PBBFAC,,,

## 2019-12-18 PROCEDURE — 72100 X-RAY EXAM L-S SPINE 2/3 VWS: CPT | Mod: 26,,, | Performed by: RADIOLOGY

## 2019-12-18 PROCEDURE — 1159F PR MEDICATION LIST DOCUMENTED IN MEDICAL RECORD: ICD-10-PCS | Mod: ,,, | Performed by: INTERNAL MEDICINE

## 2019-12-18 PROCEDURE — 99214 OFFICE O/P EST MOD 30 MIN: CPT | Mod: S$PBB,,, | Performed by: INTERNAL MEDICINE

## 2019-12-18 PROCEDURE — 72100 XR LUMBAR SPINE AP AND LAT WITH FLEX/EXT: ICD-10-PCS | Mod: 26,,, | Performed by: RADIOLOGY

## 2019-12-18 PROCEDURE — 99213 OFFICE O/P EST LOW 20 MIN: CPT | Mod: PBBFAC,25,27 | Performed by: INTERNAL MEDICINE

## 2019-12-18 PROCEDURE — 1125F AMNT PAIN NOTED PAIN PRSNT: CPT | Mod: ,,, | Performed by: INTERNAL MEDICINE

## 2019-12-18 PROCEDURE — 99211 OFF/OP EST MAY X REQ PHY/QHP: CPT | Mod: PBBFAC,25

## 2019-12-18 PROCEDURE — 99999 PR PBB SHADOW E&M-EST. PATIENT-LVL I: ICD-10-PCS | Mod: PBBFAC,,,

## 2019-12-18 PROCEDURE — 99999 PR PBB SHADOW E&M-EST. PATIENT-LVL III: ICD-10-PCS | Mod: PBBFAC,,, | Performed by: INTERNAL MEDICINE

## 2019-12-18 PROCEDURE — 99999 PR PBB SHADOW E&M-EST. PATIENT-LVL III: CPT | Mod: PBBFAC,,, | Performed by: INTERNAL MEDICINE

## 2019-12-18 PROCEDURE — 72100 X-RAY EXAM L-S SPINE 2/3 VWS: CPT | Mod: TC

## 2019-12-18 PROCEDURE — 1159F MED LIST DOCD IN RCRD: CPT | Mod: ,,, | Performed by: INTERNAL MEDICINE

## 2019-12-18 PROCEDURE — 99214 PR OFFICE/OUTPT VISIT, EST, LEVL IV, 30-39 MIN: ICD-10-PCS | Mod: S$PBB,,, | Performed by: INTERNAL MEDICINE

## 2019-12-18 RX ORDER — TRIAMCINOLONE ACETONIDE 1 MG/G
CREAM TOPICAL 2 TIMES DAILY
Qty: 30 G | Refills: 2 | Status: SHIPPED | OUTPATIENT
Start: 2019-12-18

## 2019-12-18 RX ORDER — MELOXICAM 7.5 MG/1
7.5 TABLET ORAL DAILY
Qty: 30 TABLET | Refills: 1 | Status: SHIPPED | OUTPATIENT
Start: 2019-12-18 | End: 2020-04-03 | Stop reason: SDUPTHER

## 2019-12-18 RX ORDER — MELOXICAM 7.5 MG/1
7.5 TABLET ORAL DAILY
Qty: 90 TABLET | Refills: 3 | Status: SHIPPED | OUTPATIENT
Start: 2019-12-18 | End: 2019-12-18 | Stop reason: SDUPTHER

## 2019-12-19 ENCOUNTER — TELEPHONE (OUTPATIENT)
Dept: INTERNAL MEDICINE | Facility: CLINIC | Age: 84
End: 2019-12-19

## 2019-12-19 PROBLEM — I70.0 ATHEROSCLEROSIS OF AORTA: Status: ACTIVE | Noted: 2019-12-19

## 2019-12-19 NOTE — PROGRESS NOTES
"Subjective:       Patient ID: Yogesh Shay is a 85 y.o. male.    Chief Complaint: Annual Exam; Diabetes; Hyperlipidemia; and Hypertension    Diabetes   He presents for his follow-up diabetic visit. He has type 2 diabetes mellitus. His disease course has been stable. There are no hypoglycemic associated symptoms. Pertinent negatives for hypoglycemia include no speech difficulty. There are no diabetic associated symptoms. Pertinent negatives for diabetes include no chest pain, no fatigue and no weakness. Symptoms are stable. Diabetic complications include peripheral neuropathy. He is compliant with treatment all of the time.   Hyperlipidemia   This is a chronic problem. The problem is controlled. Recent lipid tests were reviewed and are normal. Pertinent negatives include no chest pain or shortness of breath.   Hypertension   This is a chronic problem. The problem is unchanged. The problem is controlled. Pertinent negatives include no chest pain, palpitations or shortness of breath.   Back Pain   This is a chronic problem. The current episode started more than 1 month ago. The problem has been gradually worsening since onset. The pain is present in the lumbar spine. The quality of the pain is described as aching. Pertinent negatives include no abdominal pain, chest pain, dysuria or weakness. (Occ "pops" with extension)     Review of Systems   Constitutional: Negative for fatigue.   HENT: Negative for sore throat.    Eyes: Negative for visual disturbance.   Respiratory: Negative for shortness of breath.    Cardiovascular: Negative for chest pain and palpitations.   Gastrointestinal: Negative for abdominal pain.   Genitourinary: Negative for dysuria, frequency and hematuria.   Musculoskeletal: Positive for back pain. Negative for joint swelling.   Skin: Positive for rash (scattered eczema and dry skin).   Neurological: Negative for speech difficulty and weakness.   Psychiatric/Behavioral: Negative for dysphoric " mood.       Objective:      Physical Exam   Constitutional: He is oriented to person, place, and time. He appears well-developed and well-nourished. No distress.   HENT:   Head: Normocephalic and atraumatic.   Mouth/Throat: Oropharynx is clear and moist.   Eyes: Pupils are equal, round, and reactive to light. Conjunctivae are normal.   Neck: Normal range of motion. Neck supple.   Cardiovascular: Normal rate, regular rhythm and normal heart sounds.   Pulmonary/Chest: Effort normal and breath sounds normal. He has no wheezes.   Abdominal: Soft. Bowel sounds are normal. There is no tenderness.   Musculoskeletal: Normal range of motion. He exhibits no edema or tenderness.   Neurological: He is alert and oriented to person, place, and time. No cranial nerve deficit.   Skin: Skin is dry. Rash (scattered erythema- no pustules or lesions) noted. No erythema.   Psychiatric: He has a normal mood and affect.   Vitals reviewed.      Assessment:       1. Controlled type 2 diabetes mellitus with diabetic polyneuropathy, without long-term current use of insulin    2. Chronic midline low back pain without sciatica    3. Spinal instability of lumbar region    4. Eczema, unspecified type    5. Hypertension associated with diabetes    6. Left lumbar radiculopathy    7. Primary osteoarthritis of both knees    8. Atherosclerosis of aorta        Plan:       Yogesh was seen today for annual exam, diabetes, hyperlipidemia and hypertension.    Diagnoses and all orders for this visit:    Controlled type 2 diabetes mellitus with diabetic polyneuropathy, without long-term current use of insulin    Chronic midline low back pain without sciatica  -     Discontinue: meloxicam (MOBIC) 7.5 MG tablet; Take 1 tablet (7.5 mg total) by mouth once daily.  -     X-Ray Lumbar Spine Ap Lateral w/Flex Ext; Future  -     meloxicam (MOBIC) 7.5 MG tablet; Take 1 tablet (7.5 mg total) by mouth once daily.    Spinal instability of lumbar region  -     X-Ray  Lumbar Spine Ap Lateral w/Flex Ext; Future    Eczema, unspecified type  -     triamcinolone acetonide 0.1% (KENALOG) 0.1 % cream; Apply topically 2 (two) times daily.    Hypertension associated with diabetes    Left lumbar radiculopathy    Primary osteoarthritis of both knees    Atherosclerosis of aorta  Comments:  seen on xrays-medical regimen adeq        Follow up in about 6 months (around 6/18/2020) for F/U APPOINTMENT WITH ME.

## 2019-12-19 NOTE — TELEPHONE ENCOUNTER
----- Message from Aubrey Root MD sent at 12/19/2019 10:21 AM CST -----  Let pt know xray was ok-  Lots of arthritis but no evidence of instability  
Message left with findings of xray.  
Statement Selected

## 2019-12-23 DIAGNOSIS — M54.9 BACK PAIN, UNSPECIFIED BACK LOCATION, UNSPECIFIED BACK PAIN LATERALITY, UNSPECIFIED CHRONICITY: ICD-10-CM

## 2019-12-23 NOTE — TELEPHONE ENCOUNTER
----- Message from Christa Purcell sent at 12/23/2019  8:39 AM CST -----  Contact: wife/yo/[phone]  Pt called in regards to getting a Rx refill for     Hydrocodone-acetaminophen (NORCO) 5-325 mg per tablet 60 tablet 0 11/25/2019  No Take 1 tablet by mouth every 6 (six) hours as needed for Pain. Medically necessary to fill greater than 7 day supply      [facility] [phone]   Please advise

## 2019-12-27 RX ORDER — HYDROCODONE BITARTRATE AND ACETAMINOPHEN 5; 325 MG/1; MG/1
1 TABLET ORAL EVERY 6 HOURS PRN
Qty: 60 TABLET | Refills: 0 | Status: SHIPPED | OUTPATIENT
Start: 2019-12-27 | End: 2020-01-27 | Stop reason: SDUPTHER

## 2020-01-24 DIAGNOSIS — M54.9 BACK PAIN, UNSPECIFIED BACK LOCATION, UNSPECIFIED BACK PAIN LATERALITY, UNSPECIFIED CHRONICITY: ICD-10-CM

## 2020-01-24 RX ORDER — HYDROCODONE BITARTRATE AND ACETAMINOPHEN 5; 325 MG/1; MG/1
1 TABLET ORAL EVERY 6 HOURS PRN
Qty: 60 TABLET | Refills: 0 | Status: CANCELLED | OUTPATIENT
Start: 2020-01-24

## 2020-01-24 NOTE — TELEPHONE ENCOUNTER
----- Message from Ashwini Gaytan sent at 1/24/2020 11:06 AM CST -----  Contact: Patient Spouse [phone]  [facility]    Please send to new RX to [address].  [facility] [address]  [phone]    Patient is calling for an RX refill or new RX.  Is this a refill or new RX:  New RX  RX name and strength:HYDROcodone-acetaminophen (NORCO) 5-325 mg per tablet   Directions (copy/paste from chart): Take 1 tablet by mouth every 6 (six) hours as needed for Pain. Medically necessary to fill greater than 7 day     Is this a 30 day or 90 day RX:  30  Local pharmacy or mail order pharmacy:  local  Pharmacy name and phone # (copy/paste from chart):     Comments:    Patient is requesting call back once RX has been sent.

## 2020-01-27 DIAGNOSIS — M54.9 BACK PAIN, UNSPECIFIED BACK LOCATION, UNSPECIFIED BACK PAIN LATERALITY, UNSPECIFIED CHRONICITY: ICD-10-CM

## 2020-01-27 RX ORDER — HYDROCODONE BITARTRATE AND ACETAMINOPHEN 5; 325 MG/1; MG/1
1 TABLET ORAL EVERY 6 HOURS PRN
Qty: 60 TABLET | Refills: 0 | Status: SHIPPED | OUTPATIENT
Start: 2020-01-27 | End: 2020-02-24 | Stop reason: SDUPTHER

## 2020-01-27 NOTE — TELEPHONE ENCOUNTER
----- Message from Beata Ray sent at 1/27/2020  2:34 PM CST -----  Contact: Lida(wife) [phone]  Pt wife is calling to follow up on the Rx refill submitted on 1/24/2020. Pt states they live 55 miles from the pharmacy.  Please call and advise.

## 2020-02-24 DIAGNOSIS — M54.9 BACK PAIN, UNSPECIFIED BACK LOCATION, UNSPECIFIED BACK PAIN LATERALITY, UNSPECIFIED CHRONICITY: ICD-10-CM

## 2020-02-24 NOTE — TELEPHONE ENCOUNTER
----- Message from Christa Purcell sent at 2/24/2020  9:06 AM CST -----  Contact: wife yo/[phone]  Pt wife called in regards to getting a Rx refill for the pt Rx for     Hydrocodone-acetaminophen (NORCO) 5-325 mg per tablet 60 tablet 0 1/27/2020  No Take 1 tablet by mouth every 6 (six) hours as needed for Pain. Medically necessary to fill greater than 7 day supply     [facility]/pharmacy  [phone]   Please advise

## 2020-02-26 RX ORDER — HYDROCODONE BITARTRATE AND ACETAMINOPHEN 5; 325 MG/1; MG/1
1 TABLET ORAL EVERY 6 HOURS PRN
Qty: 60 TABLET | Refills: 0 | Status: SHIPPED | OUTPATIENT
Start: 2020-02-26 | End: 2020-03-24 | Stop reason: SDUPTHER

## 2020-03-02 DIAGNOSIS — K21.9 GERD (GASTROESOPHAGEAL REFLUX DISEASE): ICD-10-CM

## 2020-03-02 RX ORDER — OMEPRAZOLE 20 MG/1
CAPSULE, DELAYED RELEASE ORAL
Qty: 90 CAPSULE | Refills: 4 | Status: SHIPPED | OUTPATIENT
Start: 2020-03-02 | End: 2021-05-27

## 2020-03-17 DIAGNOSIS — I16.0 HYPERTENSIVE URGENCY: ICD-10-CM

## 2020-03-17 DIAGNOSIS — I10 ESSENTIAL HYPERTENSION: ICD-10-CM

## 2020-03-18 RX ORDER — LISINOPRIL 10 MG/1
TABLET ORAL
Qty: 90 TABLET | Refills: 3 | Status: SHIPPED | OUTPATIENT
Start: 2020-03-18 | End: 2020-08-07

## 2020-03-18 RX ORDER — METFORMIN HYDROCHLORIDE 500 MG/1
TABLET ORAL
Qty: 180 TABLET | Refills: 3 | Status: SHIPPED | OUTPATIENT
Start: 2020-03-18 | End: 2021-03-15

## 2020-03-24 DIAGNOSIS — M54.9 BACK PAIN, UNSPECIFIED BACK LOCATION, UNSPECIFIED BACK PAIN LATERALITY, UNSPECIFIED CHRONICITY: ICD-10-CM

## 2020-03-24 NOTE — TELEPHONE ENCOUNTER
----- Message from Nella Huffman sent at 3/24/2020  9:16 AM CDT -----  Contact: Lida (spouse)  [phone]  Requesting an RX refill or new RX.  Is this a refill or new RX:  Refill  RX name and strength: HYDROcodone-acetaminophen (NORCO) 5-325 mg   Directions (copy/paste from chart): Take 1 tablet by mouth every 6 (six) hours as needed for Pain. Medically necessary to fill greater than 7 day supply - Oral   Is this a 30 day or 90 day RX:  30  Local pharmacy or mail order pharmacy:  local  Pharmacy name and phone # (copy/paste from chart): [facility]  [phone] (Phone)  [phone] (Fax)  Comments:

## 2020-03-25 RX ORDER — HYDROCODONE BITARTRATE AND ACETAMINOPHEN 5; 325 MG/1; MG/1
1 TABLET ORAL EVERY 6 HOURS PRN
Qty: 60 TABLET | Refills: 0 | Status: SHIPPED | OUTPATIENT
Start: 2020-03-25 | End: 2020-04-23 | Stop reason: SDUPTHER

## 2020-04-03 ENCOUNTER — TELEPHONE (OUTPATIENT)
Dept: INTERNAL MEDICINE | Facility: CLINIC | Age: 85
End: 2020-04-03

## 2020-04-03 DIAGNOSIS — M54.50 CHRONIC MIDLINE LOW BACK PAIN WITHOUT SCIATICA: ICD-10-CM

## 2020-04-03 DIAGNOSIS — G89.29 CHRONIC MIDLINE LOW BACK PAIN WITHOUT SCIATICA: ICD-10-CM

## 2020-04-03 RX ORDER — MELOXICAM 7.5 MG/1
7.5 TABLET ORAL DAILY
Qty: 90 TABLET | Refills: 3 | Status: SHIPPED | OUTPATIENT
Start: 2020-04-03 | End: 2020-04-03 | Stop reason: SDUPTHER

## 2020-04-03 RX ORDER — MELOXICAM 7.5 MG/1
7.5 TABLET ORAL DAILY
Qty: 90 TABLET | Refills: 3 | Status: CANCELLED | OUTPATIENT
Start: 2020-04-03

## 2020-04-03 RX ORDER — MELOXICAM 7.5 MG/1
7.5 TABLET ORAL DAILY
Qty: 30 TABLET | Refills: 0 | Status: SHIPPED | OUTPATIENT
Start: 2020-04-03 | End: 2020-05-04

## 2020-04-03 NOTE — TELEPHONE ENCOUNTER
----- Message from Tamika Phelps sent at 4/1/2020  8:49 AM CDT -----  Contact: Navin Hilton    Requesting an RX refill or new RX.  Is this a refill or new RX:  refill  RX name and strength: meloxicam (MOBIC) 7.5 MG tablet  Directions (copy/paste from chart):    Is this a 30 day or 90 day RX:  90  Local pharmacy or mail order pharmacy:  [facility]  [address] [phone] (Phone)  [phone] (Fax)  Pharmacy name and phone # (copy/paste from chart):     Comments:

## 2020-04-23 DIAGNOSIS — M54.9 BACK PAIN, UNSPECIFIED BACK LOCATION, UNSPECIFIED BACK PAIN LATERALITY, UNSPECIFIED CHRONICITY: ICD-10-CM

## 2020-04-23 RX ORDER — HYDROCODONE BITARTRATE AND ACETAMINOPHEN 5; 325 MG/1; MG/1
1 TABLET ORAL EVERY 6 HOURS PRN
Qty: 60 TABLET | Refills: 0 | Status: SHIPPED | OUTPATIENT
Start: 2020-04-23 | End: 2020-05-26 | Stop reason: SDUPTHER

## 2020-04-24 DIAGNOSIS — E78.5 HYPERLIPIDEMIA ASSOCIATED WITH TYPE 2 DIABETES MELLITUS: Primary | ICD-10-CM

## 2020-04-24 DIAGNOSIS — E11.69 HYPERLIPIDEMIA ASSOCIATED WITH TYPE 2 DIABETES MELLITUS: Primary | ICD-10-CM

## 2020-04-29 NOTE — PROGRESS NOTES
Refill Routing Note    Medication(s) are not appropriate for processing by [facility]:       Required vitals are abnormal     Medication-related problems identified: Requires labs  Medication Therapy Plan: BP elevated at last PCP visit with lower back pains; NTBO(LIPID); FOVS(9/21/20)  Medication reconciliation completed: No      Automatic Epic Protocol Generated Data:    Requested Prescriptions   Pending Prescriptions Disp Refills    pravastatin (PRAVACHOL) 10 MG tablet [Pharmacy Med Name: PRAVASTATIN TABS 10MG] 90 tablet 0     Sig: TAKE 1 TABLET DAILY       Cardiovascular:  Antilipid - Statins Failed - 4/24/2020 11:15 PM        Failed - Lipid Panel completed in last 360 days     Lab Results   Component Value Date    CHOL 151 09/25/2018    HDL 59 09/25/2018    LDLCALC 67.6 09/25/2018    TRIG 122 09/25/2018             Failed - ALT is 94 or below and within 360 days     ALT   Date Value Ref Range Status   01/21/2019 12 10 - 44 U/L Final   09/25/2018 12 10 - 44 U/L Final   01/26/2018 10 10 - 44 U/L Final              Failed - AST is 54 or below and within 360 days     AST   Date Value Ref Range Status   01/21/2019 17 10 - 40 U/L Final   09/25/2018 19 10 - 40 U/L Final   01/26/2018 18 10 - 40 U/L Final              Passed - Patient is at least 18 years old        Passed - Office visit in past 12 months or future 90 days.     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    [facility] Danika Romero MD    8 months ago Anatomical narrow angle borderline glaucoma of both eyes    [facility] Danika Romero MD    8 months ago Primary osteoarthritis of right knee    [facility] Hayley Ross PA-C    8 months ago Generalized edema     [facility] - Internal Medicine SWATI Fisher          Future Appointments              In 3 weeks MD Edgar Sou[facility] - Ophthalmology, Edgar pauly    In 4 months Aubrey Root MD [facility] - Internal Medicine, [facility]               hydroCHLOROthiazide (HYDRODIURIL) 25 MG tablet [Pharmacy Med Name: HYDROCHLOROTHIAZIDE TAB 25MG] 90 tablet 0     Sig: TAKE 1 TABLET DAILY       Cardiovascular: Diuretics - Thiazide Failed - 4/24/2020 11:15 PM        Failed - Last BP in normal range within 360 days.     BP Readings from Last 3 Encounters:   12/18/19 (!) 144/100   08/12/19 130/75   01/21/19 128/78              Passed - Patient is at least 18 years old        Passed - Office visit in past 12 months or future 90 days.     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    [facility] - Ophthalmology Danika Romero MD    8 months ago Anatomical narrow angle borderline glaucoma of both eyes    [facility] - Ophthalmology Danika Romero MD    8 months ago Primary osteoarthritis of right knee    [facility] - Orthopedics After [facility] Hayley Ross PA-C    8 months ago Generalized edema    [facility] Internal Medicine SWATI Fisher          Future Appointments              In 3 weeks MD Edgar Souza [facility] - Ophthalmology, Edgar pauly    In 4 months MD Edgar Mortensen [facility] Internal Medicine, [facility]                Passed - Ca in normal range and within 360 days     Calcium   Date Value Ref Range Status   12/09/2019 9.8 8.7 - 10.5 mg/dL Final   01/21/2019 9.6 8.7 - 10.5 mg/dL Final   09/25/2018 9.9 8.7 - 10.5 mg/dL Final              Passed - Cr is 1.3 or below and within 180 days     Creatinine   Date Value Ref Range Status   12/09/2019 0.9 0.5 - 1.4 mg/dL Final    01/21/2019 0.9 0.5 - 1.4 mg/dL Final   09/25/2018 0.8 0.5 - 1.4 mg/dL Final              Passed - K in normal range and within 180 days     Potassium   Date Value Ref Range Status   12/09/2019 4.9 3.5 - 5.1 mmol/L Final   01/21/2019 5.4 (H) 3.5 - 5.1 mmol/L Final   09/25/2018 4.4 3.5 - 5.1 mmol/L Final              Passed - Na is between 130 and 148 and within 180 days     Sodium   Date Value Ref Range Status   12/09/2019 133 (L) 136 - 145 mmol/L Final   01/21/2019 129 (L) 136 - 145 mmol/L Final   09/25/2018 133 (L) 136 - 145 mmol/L Final              Passed - eGFR within 180 days     eGFR if non    Date Value Ref Range Status   12/09/2019 >60.0 >60 mL/min/1.73 m^2 Final     Comment:     Calculation used to obtain the estimated glomerular filtration  rate (eGFR) is the CKD-EPI equation.      01/21/2019 >60 >60 mL/min/1.73 m^2 Final     Comment:     Calculation used to obtain the estimated glomerular filtration  rate (eGFR) is the CKD-EPI equation.      09/25/2018 >60.0 >60 mL/min/1.73 m^2 Final     Comment:     Calculation used to obtain the estimated glomerular filtration  rate (eGFR) is the CKD-EPI equation.        eGFR if    Date Value Ref Range Status   12/09/2019 >60.0 >60 mL/min/1.73 m^2 Final   01/21/2019 >60 >60 mL/min/1.73 m^2 Final   09/25/2018 >60.0 >60 mL/min/1.73 m^2 Final                 Appointments  past 12m or future 3m with PCP    Date Provider   Last Visit   12/18/2019 Aubrey Root MD   Next Visit   9/21/2020 Aubrey Root MD   ED visits in past 90 days: 0     Note composed:3:10 PM 04/29/2020

## 2020-04-30 RX ORDER — HYDROCHLOROTHIAZIDE 25 MG/1
TABLET ORAL
Qty: 90 TABLET | Refills: 3 | Status: SHIPPED | OUTPATIENT
Start: 2020-04-30 | End: 2021-04-22

## 2020-04-30 RX ORDER — PRAVASTATIN SODIUM 10 MG/1
TABLET ORAL
Qty: 90 TABLET | Refills: 3 | Status: SHIPPED | OUTPATIENT
Start: 2020-04-30 | End: 2021-04-22

## 2020-04-30 NOTE — PROGRESS NOTES
Please schedule patient for Labs (LIPID)    Please also check with your provider if any further labs need to be added and scheduled together.    Thanks !

## 2020-05-03 DIAGNOSIS — M54.50 CHRONIC MIDLINE LOW BACK PAIN WITHOUT SCIATICA: ICD-10-CM

## 2020-05-03 DIAGNOSIS — G89.29 CHRONIC MIDLINE LOW BACK PAIN WITHOUT SCIATICA: ICD-10-CM

## 2020-05-04 RX ORDER — MELOXICAM 7.5 MG/1
TABLET ORAL
Qty: 30 TABLET | Refills: 0 | Status: SHIPPED | OUTPATIENT
Start: 2020-05-04 | End: 2020-06-01

## 2020-05-18 DIAGNOSIS — M54.16 LEFT LUMBAR RADICULOPATHY: Primary | ICD-10-CM

## 2020-05-18 NOTE — TELEPHONE ENCOUNTER
----- Message from Eboni Skelton sent at 5/18/2020  8:30 AM CDT -----  Rx Refill/Request     Is this a Refill:--Yes--     Rx Name and Strength:    1.gabapentin (NEURONTIN) 300 MG capsule    Preferred Pharm[facility][phone]--  [address] [facility] [address]    Communication Preference:--Lida--Wife--[phone]--    Additional Information:Refill request is needed for medication listed above. Please call to advise when sent to the pharmacy.

## 2020-05-20 RX ORDER — GABAPENTIN 300 MG/1
CAPSULE ORAL
Qty: 90 CAPSULE | Refills: 3 | Status: SHIPPED | OUTPATIENT
Start: 2020-05-20

## 2020-05-26 DIAGNOSIS — M54.9 BACK PAIN, UNSPECIFIED BACK LOCATION, UNSPECIFIED BACK PAIN LATERALITY, UNSPECIFIED CHRONICITY: ICD-10-CM

## 2020-05-26 RX ORDER — HYDROCODONE BITARTRATE AND ACETAMINOPHEN 5; 325 MG/1; MG/1
1 TABLET ORAL EVERY 6 HOURS PRN
Qty: 60 TABLET | Refills: 0 | Status: SHIPPED | OUTPATIENT
Start: 2020-05-26 | End: 2020-06-24 | Stop reason: SDUPTHER

## 2020-05-30 DIAGNOSIS — M54.50 CHRONIC MIDLINE LOW BACK PAIN WITHOUT SCIATICA: ICD-10-CM

## 2020-05-30 DIAGNOSIS — G89.29 CHRONIC MIDLINE LOW BACK PAIN WITHOUT SCIATICA: ICD-10-CM

## 2020-06-01 RX ORDER — MELOXICAM 7.5 MG/1
TABLET ORAL
Qty: 30 TABLET | Refills: 12 | Status: SHIPPED | OUTPATIENT
Start: 2020-06-01 | End: 2021-06-21

## 2020-06-09 DIAGNOSIS — M54.50 CHRONIC MIDLINE LOW BACK PAIN WITHOUT SCIATICA: ICD-10-CM

## 2020-06-09 DIAGNOSIS — G89.29 CHRONIC MIDLINE LOW BACK PAIN WITHOUT SCIATICA: ICD-10-CM

## 2020-06-09 RX ORDER — MELOXICAM 7.5 MG/1
7.5 TABLET ORAL DAILY
Qty: 30 TABLET | Refills: 12 | OUTPATIENT
Start: 2020-06-09

## 2020-06-09 NOTE — TELEPHONE ENCOUNTER
----- Message from Chad Montoya sent at 6/9/2020 11:12 AM CDT -----  Contact: Self [phone]  Requesting an RX refill or new RX.  Is this a refill or new RX:  refill  RX name and strength: meloxicam (MOBIC) 7.5 MG tablet  Directions (copy/paste from chart):    Is this a 30 day or 90 day RX:  90 day  Local pharmacy or mail order pharmacy:  local  Pharmacy name and phone # (copy/paste from chart): [facility]/pharmacy #[id number] - [place] - [address] [phone] (Phone)  [phone] (Fax)    Comments:

## 2020-06-24 DIAGNOSIS — M54.9 BACK PAIN, UNSPECIFIED BACK LOCATION, UNSPECIFIED BACK PAIN LATERALITY, UNSPECIFIED CHRONICITY: ICD-10-CM

## 2020-06-24 NOTE — TELEPHONE ENCOUNTER
----- Message from Hakan Zhong sent at 6/24/2020 11:02 AM CDT -----  Contact: Wife- [phone] (M)  Prescription refill request.  RX name and strength: HYDROcodone-acetaminophen (NORCO) 5-325 mg per tablet    Directions: Take 1 tablet by mouth every 6 (six) hours as needed for Pain. Medically necessary to fill greater than 7 day supply - Oral    Is this a 30 day or 90 day RX:  30 day    Local pharmacy or mail order pharmacy:  Local     Pharmacy name and phone #: [facility]/pharmacy #[id number]  [place]  [address] [phone] (Phone)  [phone] (Fax)    Additional information:   Please call once refilled

## 2020-06-26 RX ORDER — HYDROCODONE BITARTRATE AND ACETAMINOPHEN 5; 325 MG/1; MG/1
1 TABLET ORAL EVERY 6 HOURS PRN
Qty: 60 TABLET | Refills: 0 | Status: SHIPPED | OUTPATIENT
Start: 2020-06-26 | End: 2020-07-24 | Stop reason: SDUPTHER

## 2020-07-17 DIAGNOSIS — Z71.89 COMPLEX CARE COORDINATION: ICD-10-CM

## 2020-07-24 DIAGNOSIS — M54.9 BACK PAIN, UNSPECIFIED BACK LOCATION, UNSPECIFIED BACK PAIN LATERALITY, UNSPECIFIED CHRONICITY: ICD-10-CM

## 2020-07-24 NOTE — TELEPHONE ENCOUNTER
----- Message from Ashwini Gaytan sent at 7/24/2020 10:02 AM CDT -----  Regarding: refill  Contact: patient [phone]  Requesting an RX refill or new RX.  Is this a refill or new RX:  refill  RX name and strength: HYDROcodone-acetaminophen (NORCO) 5-325 mg per tablet  Directions (copy/paste from chart):  : Take 1 tablet by mouth every 6 (six) hours as needed for Pain. Medically necessary to fill greater than 7 day supply - Oral  Is this a 30 day or 90 day RX:  30  Local pharmacy or mail order pharmacy:  local  Pharmacy name and phone # [id number] - [place]  [address] [phone] (Phone) [phone] (Fax)

## 2020-07-29 ENCOUNTER — TELEPHONE (OUTPATIENT)
Dept: INTERNAL MEDICINE | Facility: CLINIC | Age: 85
End: 2020-07-29

## 2020-07-29 RX ORDER — HYDROCODONE BITARTRATE AND ACETAMINOPHEN 5; 325 MG/1; MG/1
1 TABLET ORAL EVERY 6 HOURS PRN
Qty: 60 TABLET | Refills: 0 | Status: SHIPPED | OUTPATIENT
Start: 2020-07-29 | End: 2020-08-25 | Stop reason: SDUPTHER

## 2020-07-29 NOTE — TELEPHONE ENCOUNTER
----- Message from Carine Kingston sent at 2020  9:34 AM CDT -----  Regardinnd request  Contact: self/[phone]  Contact: patient [phone]  Requesting an RX refill or new RX.  Is this a refill or new RX:  refill  RX name and strength: HYDROcodone-acetaminophen (NORCO) 5-325 mg per tablet  Directions (copy/paste from chart):  : Take 1 tablet by mouth every 6 (six) hours as needed for Pain. Medically necessary to fill greater than 7 day supply - Oral  Is this a 30 day or 90 day RX:  30  Local pharmacy or mail order pharmacy:  local  Pharmacy name and phone # [id number] - [place]  [address]   [phone] (Phone) [phone] (Fax)

## 2020-08-03 ENCOUNTER — PATIENT OUTREACH (OUTPATIENT)
Dept: ADMINISTRATIVE | Facility: OTHER | Age: 85
End: 2020-08-03

## 2020-08-03 DIAGNOSIS — E11.42 CONTROLLED TYPE 2 DIABETES MELLITUS WITH DIABETIC POLYNEUROPATHY, WITHOUT LONG-TERM CURRENT USE OF INSULIN: Primary | ICD-10-CM

## 2020-08-03 NOTE — PROGRESS NOTES
HPI     Glaucoma      Additional comments: IOP ck and DFE today              Decreased Visual Acuity      Additional comments: Pt feels vision is getting worse and having more   difficulty driving at night              Comments     DLS: 12/9/19    VA OS has gotten worse and he thinks he's finally ready for cataract   surgery. He has systemic hypertension and hasn't seen his PCP in over a   year. He is still driving and having more trouble with driving.     1. MMG   2. Hx Narrow Angles OU   3. NS OU   4. Type 2 DM no BDR   5. Hx Tumor/Cancer RLL     MEDS:  Latanoprost QHS OU          Last edited by Nicki Ruiz MD on 8/4/2020 12:23 PM. (History)            Assessment /Plan     For exam results, see Encounter Report.    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        Glaucoma meds    Latanoprost ou - started 6/15/2018         H/O adverse rxn to glaucoma drops    none        LASERS    Laser PI's 4/2018 - od // OS 5/2018         GLAUCOMA SURGERIES    none        OTHER EYE SURGERIES    none        CDR    0.7/0.4         Tbase    19-27  od // 22-40 os         Tmax     27/40          Ttarget    ?             HVF    2 test 2018 to  2019 - unreliable  od  // unreliable  Os (improved ou)         Gonio    +3 w/ steep approach - post PI's ou         CCT    518 / 528        OCT    2 test 2018 to 2020 - RNFL - Full od // dec G/TS os         HRT    2 test 2018 to 2019 - MR - high std dev. od // high std dev os        Disc photos    6/2018     - Ttoday    16/21 (on latanoprost - ( down from 26/40))  - Test done today   IOP / OCT    2. 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 Diabetes    No BDR seen on exam 6/15/2018     4. H/O tumor / cancer RLL    S/P removal and re-construction at [facility]    The biopsy was done by amanda - but the surgery was done by some one else     5. Has some abdominal issues    Having surgery next week - 3/15/2018     6. Episopde of blurry vision ou    At [facility] 2018 - resolved    7. Hemiretinal vein occulusion with macular edema, right eye vs sup BRVO w/   -No NVI   -On ASA 81 mg       ASSESSMENT  New superior HRVO OD  Risk factors glaucoma, +HTN  IOP improved, but OS progression by RNFL analysis     PLAN   For MMG - CPM with latanoprost qHS OU  Photos today  Refer to retina for evaluation and management  Educate and  on etiology and treatment of HRVO  Educated that if he gets medication / injections with Retina, cataract will likely progress faster  Recommend him to see PCP ASAP for BP check and mgmt of HTN    F/U 2 months for cataract eval  - will require complex cataract extraction with use of malyugan ring   - whenever done it will be complex // VERY shallow AC and very dense lens / on flomax - small pupil  - high risk for corneal edema and other complications // both cataracts are similar -     Likely will require CE IOL OS first then OD - consider CE os - since the right eye has BRVO w/ ME

## 2020-08-03 NOTE — PROGRESS NOTES
Requested updates within Care Everywhere.  Patient's chart was reviewed for overdue NICHOLAS topics.  Immunizations reconciled.    Orders placed:Hemoglobin A1c  Labs Linked:n/a

## 2020-08-04 ENCOUNTER — TELEPHONE (OUTPATIENT)
Dept: INTERNAL MEDICINE | Facility: CLINIC | Age: 85
End: 2020-08-04

## 2020-08-04 ENCOUNTER — OFFICE VISIT (OUTPATIENT)
Dept: OPHTHALMOLOGY | Facility: CLINIC | Age: 85
End: 2020-08-04
Payer: MEDICARE

## 2020-08-04 DIAGNOSIS — H40.053 OCULAR HYPERTENSION, BILATERAL: ICD-10-CM

## 2020-08-04 DIAGNOSIS — H57.03 SMALL PUPILS: ICD-10-CM

## 2020-08-04 DIAGNOSIS — Z98.890 S/P LASER IRIDOTOMY: ICD-10-CM

## 2020-08-04 DIAGNOSIS — H34.8110 HEMISPHERIC RETINAL VEIN OCCLUSION WITH MACULAR EDEMA OF RIGHT EYE: ICD-10-CM

## 2020-08-04 DIAGNOSIS — E11.9 TYPE 2 DIABETES MELLITUS WITHOUT OPHTHALMIC MANIFESTATIONS: ICD-10-CM

## 2020-08-04 DIAGNOSIS — H21.81 TAMSULOSIN-ASSOCIATED INTRAOPERATIVE FLOPPY IRIS SYNDROME (IFIS): ICD-10-CM

## 2020-08-04 DIAGNOSIS — H34.8310 BRANCH RETINAL VEIN OCCLUSION OF RIGHT EYE WITH MACULAR EDEMA: ICD-10-CM

## 2020-08-04 DIAGNOSIS — H25.13 NUCLEAR SENILE CATARACT OF BOTH EYES: ICD-10-CM

## 2020-08-04 DIAGNOSIS — H40.89 GLAUCOMA DUE TO COMBINATION OF MECHANISMS: Primary | ICD-10-CM

## 2020-08-04 DIAGNOSIS — T44.6X5A TAMSULOSIN-ASSOCIATED INTRAOPERATIVE FLOPPY IRIS SYNDROME (IFIS): ICD-10-CM

## 2020-08-04 DIAGNOSIS — H40.033 ANATOMICAL NARROW ANGLE BORDERLINE GLAUCOMA OF BOTH EYES: ICD-10-CM

## 2020-08-04 PROCEDURE — 99999 PR PBB SHADOW E&M-EST. PATIENT-LVL III: ICD-10-PCS | Mod: PBBFAC,,, | Performed by: OPHTHALMOLOGY

## 2020-08-04 PROCEDURE — 99999 PR PBB SHADOW E&M-EST. PATIENT-LVL III: CPT | Mod: PBBFAC,,, | Performed by: OPHTHALMOLOGY

## 2020-08-04 PROCEDURE — 92014 PR EYE EXAM, EST PATIENT,COMPREHESV: ICD-10-PCS | Mod: S$PBB,,, | Performed by: OPHTHALMOLOGY

## 2020-08-04 PROCEDURE — 92133 CPTRZD OPH DX IMG PST SGM ON: CPT | Mod: PBBFAC | Performed by: OPHTHALMOLOGY

## 2020-08-04 PROCEDURE — 92014 COMPRE OPH EXAM EST PT 1/>: CPT | Mod: S$PBB,,, | Performed by: OPHTHALMOLOGY

## 2020-08-04 PROCEDURE — 99213 OFFICE O/P EST LOW 20 MIN: CPT | Mod: PBBFAC,25 | Performed by: OPHTHALMOLOGY

## 2020-08-04 PROCEDURE — 92250 COLOR FUNDUS PHOTOGRAPHY - OU - BOTH EYES: ICD-10-PCS | Mod: 26,S$PBB,, | Performed by: OPHTHALMOLOGY

## 2020-08-04 PROCEDURE — 92250 FUNDUS PHOTOGRAPHY W/I&R: CPT | Mod: PBBFAC,59 | Performed by: OPHTHALMOLOGY

## 2020-08-04 NOTE — TELEPHONE ENCOUNTER
Pt is having a retina issue that was diagnosed today by Dr. Romero. Offered pt a follow up with JASMIN Blake, however pt wife only wants pt to see pcp . Please advise if pcp can work pt in.

## 2020-08-04 NOTE — TELEPHONE ENCOUNTER
----- Message from Kristie Tran sent at 8/4/2020  2:37 PM CDT -----  Contact: yo(wife) [phone]  Pt is requesting to see dr asap due to an issue with his right eye. Earliest appt is in sept. Please advise

## 2020-08-07 ENCOUNTER — OFFICE VISIT (OUTPATIENT)
Dept: INTERNAL MEDICINE | Facility: CLINIC | Age: 85
End: 2020-08-07
Payer: MEDICARE

## 2020-08-07 VITALS
WEIGHT: 144.19 LBS | BODY MASS INDEX: 23.17 KG/M2 | DIASTOLIC BLOOD PRESSURE: 98 MMHG | HEART RATE: 68 BPM | SYSTOLIC BLOOD PRESSURE: 172 MMHG | HEIGHT: 66 IN

## 2020-08-07 DIAGNOSIS — M25.511 CHRONIC RIGHT SHOULDER PAIN: ICD-10-CM

## 2020-08-07 DIAGNOSIS — E11.59 HYPERTENSION ASSOCIATED WITH DIABETES: ICD-10-CM

## 2020-08-07 DIAGNOSIS — I70.0 ATHEROSCLEROSIS OF AORTA: ICD-10-CM

## 2020-08-07 DIAGNOSIS — I16.0 HYPERTENSIVE URGENCY: ICD-10-CM

## 2020-08-07 DIAGNOSIS — I10 ESSENTIAL HYPERTENSION: Primary | ICD-10-CM

## 2020-08-07 DIAGNOSIS — E46 MALNUTRITION, UNSPECIFIED TYPE: ICD-10-CM

## 2020-08-07 DIAGNOSIS — I15.2 HYPERTENSION ASSOCIATED WITH DIABETES: ICD-10-CM

## 2020-08-07 DIAGNOSIS — G89.29 CHRONIC RIGHT SHOULDER PAIN: ICD-10-CM

## 2020-08-07 PROCEDURE — 99213 PR OFFICE/OUTPT VISIT, EST, LEVL III, 20-29 MIN: ICD-10-PCS | Mod: S$PBB,,, | Performed by: INTERNAL MEDICINE

## 2020-08-07 PROCEDURE — 99213 OFFICE O/P EST LOW 20 MIN: CPT | Mod: S$PBB,,, | Performed by: INTERNAL MEDICINE

## 2020-08-07 PROCEDURE — 99999 PR PBB SHADOW E&M-EST. PATIENT-LVL IV: CPT | Mod: PBBFAC,,, | Performed by: INTERNAL MEDICINE

## 2020-08-07 PROCEDURE — 99214 OFFICE O/P EST MOD 30 MIN: CPT | Mod: PBBFAC | Performed by: INTERNAL MEDICINE

## 2020-08-07 PROCEDURE — 99999 PR PBB SHADOW E&M-EST. PATIENT-LVL IV: ICD-10-PCS | Mod: PBBFAC,,, | Performed by: INTERNAL MEDICINE

## 2020-08-07 RX ORDER — LISINOPRIL 20 MG/1
20 TABLET ORAL DAILY
Qty: 90 TABLET | Refills: 3 | Status: SHIPPED | OUTPATIENT
Start: 2020-08-07 | End: 2021-06-07

## 2020-08-11 ENCOUNTER — OFFICE VISIT (OUTPATIENT)
Dept: OPHTHALMOLOGY | Facility: CLINIC | Age: 85
End: 2020-08-11
Payer: MEDICARE

## 2020-08-11 DIAGNOSIS — H34.8110 HEMISPHERIC RETINAL VEIN OCCLUSION WITH MACULAR EDEMA OF RIGHT EYE: Primary | ICD-10-CM

## 2020-08-11 DIAGNOSIS — H25.13 NS (NUCLEAR SCLEROSIS), BILATERAL: ICD-10-CM

## 2020-08-11 DIAGNOSIS — H35.033 HYPERTENSIVE RETINOPATHY, BILATERAL: ICD-10-CM

## 2020-08-11 PROCEDURE — 92202 OPSCPY EXTND ON/MAC DRAW: CPT | Mod: S$PBB,,, | Performed by: OPHTHALMOLOGY

## 2020-08-11 PROCEDURE — 99999 PR PBB SHADOW E&M-EST. PATIENT-LVL IV: ICD-10-PCS | Mod: PBBFAC,,, | Performed by: OPHTHALMOLOGY

## 2020-08-11 PROCEDURE — 92202 OPSCPY EXTND ON/MAC DRAW: CPT | Mod: PBBFAC | Performed by: OPHTHALMOLOGY

## 2020-08-11 PROCEDURE — 92014 PR EYE EXAM, EST PATIENT,COMPREHESV: ICD-10-PCS | Mod: S$PBB,,, | Performed by: OPHTHALMOLOGY

## 2020-08-11 PROCEDURE — 92202 PR OPHTHALMOSCOPY, EXT, W/DRAW OPTIC NERVE/MACULA, I&R, UNI/BI: ICD-10-PCS | Mod: S$PBB,,, | Performed by: OPHTHALMOLOGY

## 2020-08-11 PROCEDURE — 99999 PR PBB SHADOW E&M-EST. PATIENT-LVL IV: CPT | Mod: PBBFAC,,, | Performed by: OPHTHALMOLOGY

## 2020-08-11 PROCEDURE — 92014 COMPRE OPH EXAM EST PT 1/>: CPT | Mod: S$PBB,,, | Performed by: OPHTHALMOLOGY

## 2020-08-11 PROCEDURE — 92134 POSTERIOR SEGMENT OCT RETINA (OCULAR COHERENCE TOMOGRAPHY)-BOTH EYES: ICD-10-PCS | Mod: 26,S$PBB,, | Performed by: OPHTHALMOLOGY

## 2020-08-11 PROCEDURE — 92134 CPTRZ OPH DX IMG PST SGM RTA: CPT | Mod: PBBFAC | Performed by: OPHTHALMOLOGY

## 2020-08-11 PROCEDURE — 99214 OFFICE O/P EST MOD 30 MIN: CPT | Mod: PBBFAC,25 | Performed by: OPHTHALMOLOGY

## 2020-08-11 NOTE — PROGRESS NOTES
HPI     Eye Problem      Additional comments: BRVO OD per Dr. Romero              Comments     Referred by Dr. Romero for BRVO    Patient states vision is stable since seeing Dr. Romero last week.   (+)floaters (+)intermittent flashes (+)photophobia. Has trouble driving at   night.     Meds: Latanoprost QHS OU           Last edited by Baylee Calles on 8/11/2020  1:39 PM. (History)          OCT - OD - ST heme minimal paracentral heme  OS - no ME      A/P    1. Superior HRVO OD  Minimal CME today    obs today    2. HTN Ret OU  BP control    3. NS OU  Ok for CE  Pt aware of increased risk of CME OD post CE    4. MMG OU  Mgmt per Dr. Romero      3 months OCT

## 2020-08-11 NOTE — LETTER
August 11, 2020      Danika Romero MD  [address]  [facility] [address]           [facility] - Ophthalmology  [address]  [facility] [address]  Phone: [phone]  Fax: [phone]          Patient: Yogesh Shay   MR Number: [id number]   YOB: 1934   Date of Visit: 8/11/2020       Dear Dr. Danika Romero:    Thank you for referring Yogesh Shay to me for evaluation. Attached you will find relevant portions of my assessment and plan of care.    If you have questions, please do not hesitate to call me. I look forward to following Yogesh Shay along with you.    Sincerely,    ADRIANNE Gould MD    Enclosure  CC:  No Recipients    If you would like to receive this communication electronically, please contact [email or web address] or [phone] to request more information on [employer] Link access.    For providers and/or their staff who would like to refer a patient to [facility], please contact us through our one-stop-shop provider referral line, [facility], at [phone].    If you feel you have received this communication in error or would no longer like to receive these types of communications, please e-mail [email or web address]

## 2020-08-17 NOTE — PROGRESS NOTES
Subjective:       Patient ID: Yogesh Shay is a 86 y.o. male.    Chief Complaint: Hypertension, Hyperlipidemia, Diabetes, and Shoulder Injury (L  s/p fall)    Hypertension  This is a chronic problem. The problem has been gradually worsening since onset. The problem is uncontrolled. Pertinent negatives include no chest pain or shortness of breath. Past treatments include ACE inhibitors. The current treatment provides moderate improvement.   Hyperlipidemia  This is a chronic problem. The problem is controlled. Recent lipid tests were reviewed and are normal. Pertinent negatives include no chest pain or shortness of breath.   Diabetes  He presents for his follow-up diabetic visit. He has type 2 diabetes mellitus. His disease course has been stable. There are no diabetic associated symptoms. Pertinent negatives for diabetes include no chest pain. There are no diabetic complications.   Shoulder Injury   The incident occurred at home. The right shoulder is affected. There was no injury mechanism. The quality of the pain is described as aching. The pain is moderate. Pertinent negatives include no chest pain.     Review of Systems   Respiratory: Negative for shortness of breath.    Cardiovascular: Negative for chest pain.       Objective:      Physical Exam  Vitals signs reviewed.   Constitutional:       General: He is not in acute distress.     Appearance: He is well-developed.   HENT:      Head: Normocephalic and atraumatic.   Eyes:      Conjunctiva/sclera: Conjunctivae normal.      Pupils: Pupils are equal, round, and reactive to light.   Neck:      Musculoskeletal: Normal range of motion and neck supple.   Cardiovascular:      Rate and Rhythm: Normal rate and regular rhythm.      Heart sounds: Normal heart sounds.   Pulmonary:      Effort: Pulmonary effort is normal.      Breath sounds: Normal breath sounds. No wheezing.   Abdominal:      General: Bowel sounds are normal.      Palpations: Abdomen is soft.       Tenderness: There is no abdominal tenderness.   Musculoskeletal: Normal range of motion.      Right shoulder: He exhibits tenderness. He exhibits normal range of motion, no bony tenderness, no deformity and normal strength.   Skin:     Findings: No erythema.   Neurological:      Mental Status: He is alert and oriented to person, place, and time.      Cranial Nerves: No cranial nerve deficit.         Assessment:       1. Essential hypertension    2. Hypertensive urgency    3. Hypertension associated with diabetes    4. Malnutrition, unspecified type    5. Atherosclerosis of aorta    6. Chronic right shoulder pain        Plan:       Yogesh was seen today for hypertension, hyperlipidemia, diabetes and shoulder injury.    Diagnoses and all orders for this visit:    Essential hypertension  -     lisinopriL (PRINIVIL,ZESTRIL) 20 MG tablet; Take 1 tablet (20 mg total) by mouth once daily.    Hypertensive urgency  Comments:  high related to variable compliance-  will adjust med today    Hypertension associated with diabetes    Malnutrition, unspecified type    Atherosclerosis of aorta    Chronic right shoulder pain  Comments:  discussed adjusting activities        Follow up in about 6 weeks (around 9/18/2020) for F/U WITH ME OR APC.

## 2020-08-25 DIAGNOSIS — M54.9 BACK PAIN, UNSPECIFIED BACK LOCATION, UNSPECIFIED BACK PAIN LATERALITY, UNSPECIFIED CHRONICITY: ICD-10-CM

## 2020-08-25 NOTE — TELEPHONE ENCOUNTER
----- Message from Linda Concepcion sent at 8/25/2020 12:37 PM CDT -----  Regarding: refill  Contact: yo(wife) [phone]  Requesting an RX refill or new RX.  Is this a refill or new RX:  refill  RX name and strength: HYDROcodone-acetaminophen (NORCO) 5-325 mg per tablet  Directions (copy/paste from chart):  Sig - Route: Take 1 tablet by mouth every 6 (six) hours as needed for Pain. Medically necessary to fill greater than 7 day supply - Oral  Is this a 30 day or 90 day RX:    Local pharmacy or mail order pharmacy:  local  Pharmacy name and phone # (copy/paste from chart):   [facility]/pharmacy #[id number] - [place] - [address] [phone] (Phone)  [phone] (Fax)  Comments:

## 2020-08-26 RX ORDER — HYDROCODONE BITARTRATE AND ACETAMINOPHEN 5; 325 MG/1; MG/1
1 TABLET ORAL EVERY 6 HOURS PRN
Qty: 60 TABLET | Refills: 0 | Status: SHIPPED | OUTPATIENT
Start: 2020-08-26 | End: 2020-09-25 | Stop reason: SDUPTHER

## 2020-09-09 ENCOUNTER — PATIENT OUTREACH (OUTPATIENT)
Dept: ADMINISTRATIVE | Facility: HOSPITAL | Age: 85
End: 2020-09-09

## 2020-09-09 DIAGNOSIS — H40.053 OCULAR HYPERTENSION, BILATERAL: ICD-10-CM

## 2020-09-09 DIAGNOSIS — H40.023 OPEN ANGLE WITH BORDERLINE FINDINGS AND HIGH GLAUCOMA RISK IN BOTH EYES: ICD-10-CM

## 2020-09-09 DIAGNOSIS — H40.033 ANATOMICAL NARROW ANGLE BORDERLINE GLAUCOMA OF BOTH EYES: ICD-10-CM

## 2020-09-09 RX ORDER — LATANOPROST 50 UG/ML
1 SOLUTION/ DROPS OPHTHALMIC DAILY
Qty: 2.5 ML | Refills: 12 | Status: SHIPPED | OUTPATIENT
Start: 2020-09-09 | End: 2021-08-27 | Stop reason: SDUPTHER

## 2020-09-23 ENCOUNTER — TELEPHONE (OUTPATIENT)
Dept: INTERNAL MEDICINE | Facility: CLINIC | Age: 85
End: 2020-09-23

## 2020-09-23 NOTE — TELEPHONE ENCOUNTER
----- Message from Krystin Romero sent at 9/23/2020  9:28 AM CDT -----  Regarding: Pt's [phone]  Pt's spouse is requesting a callback.  She states that she received a call from the doctor's office and she couldn't understand what was being said on the phone.    Callback number: Pt's rzisab-[phone]

## 2020-09-25 ENCOUNTER — IMMUNIZATION (OUTPATIENT)
Dept: INTERNAL MEDICINE | Facility: CLINIC | Age: 85
End: 2020-09-25
Payer: MEDICARE

## 2020-09-25 ENCOUNTER — OFFICE VISIT (OUTPATIENT)
Dept: INTERNAL MEDICINE | Facility: CLINIC | Age: 85
End: 2020-09-25
Payer: MEDICARE

## 2020-09-25 VITALS
HEIGHT: 66 IN | SYSTOLIC BLOOD PRESSURE: 134 MMHG | HEART RATE: 60 BPM | DIASTOLIC BLOOD PRESSURE: 76 MMHG | WEIGHT: 144.63 LBS | BODY MASS INDEX: 23.24 KG/M2

## 2020-09-25 DIAGNOSIS — I15.2 HYPERTENSION ASSOCIATED WITH DIABETES: Primary | ICD-10-CM

## 2020-09-25 DIAGNOSIS — E11.42 DIABETIC POLYNEUROPATHY ASSOCIATED WITH TYPE 2 DIABETES MELLITUS: ICD-10-CM

## 2020-09-25 DIAGNOSIS — M54.9 BACK PAIN, UNSPECIFIED BACK LOCATION, UNSPECIFIED BACK PAIN LATERALITY, UNSPECIFIED CHRONICITY: ICD-10-CM

## 2020-09-25 DIAGNOSIS — E11.59 HYPERTENSION ASSOCIATED WITH DIABETES: Primary | ICD-10-CM

## 2020-09-25 PROCEDURE — 99999 PR PBB SHADOW E&M-EST. PATIENT-LVL IV: ICD-10-PCS | Mod: PBBFAC,,, | Performed by: INTERNAL MEDICINE

## 2020-09-25 PROCEDURE — 90694 VACC AIIV4 NO PRSRV 0.5ML IM: CPT | Mod: PBBFAC

## 2020-09-25 PROCEDURE — 99999 PR PBB SHADOW E&M-EST. PATIENT-LVL IV: CPT | Mod: PBBFAC,,, | Performed by: INTERNAL MEDICINE

## 2020-09-25 PROCEDURE — 99213 PR OFFICE/OUTPT VISIT, EST, LEVL III, 20-29 MIN: ICD-10-PCS | Mod: S$PBB,,, | Performed by: INTERNAL MEDICINE

## 2020-09-25 PROCEDURE — 99213 OFFICE O/P EST LOW 20 MIN: CPT | Mod: S$PBB,,, | Performed by: INTERNAL MEDICINE

## 2020-09-25 PROCEDURE — G0008 ADMIN INFLUENZA VIRUS VAC: HCPCS | Mod: PBBFAC

## 2020-09-25 PROCEDURE — 99214 OFFICE O/P EST MOD 30 MIN: CPT | Mod: PBBFAC | Performed by: INTERNAL MEDICINE

## 2020-09-25 RX ORDER — TRIAMCINOLONE ACETONIDE 1 MG/G
OINTMENT TOPICAL
COMMUNITY
Start: 2020-09-04 | End: 2021-08-04 | Stop reason: SDUPTHER

## 2020-09-25 RX ORDER — HYDROCODONE BITARTRATE AND ACETAMINOPHEN 5; 325 MG/1; MG/1
1 TABLET ORAL EVERY 6 HOURS PRN
Qty: 60 TABLET | Refills: 0 | Status: SHIPPED | OUTPATIENT
Start: 2020-09-25 | End: 2020-10-26 | Stop reason: SDUPTHER

## 2020-09-25 NOTE — PROGRESS NOTES
Subjective:       Patient ID: Yogesh Shay is a 86 y.o. male.    Chief Complaint: Hypertension (fu elevated BP), Diabetes, and Hyperlipidemia    Hypertension  This is a chronic problem. The problem is unchanged. The problem is controlled. Pertinent negatives include no chest pain or shortness of breath. The current treatment provides significant improvement. There are no compliance problems.    Diabetes  He presents for his follow-up diabetic visit. He has type 2 diabetes mellitus. Pertinent negatives for diabetes include no chest pain.   Hyperlipidemia  Pertinent negatives include no chest pain or shortness of breath.     Review of Systems   Respiratory: Negative for shortness of breath.    Cardiovascular: Negative for chest pain.       Objective:      Physical Exam  Vitals signs reviewed.   Constitutional:       General: He is not in acute distress.     Appearance: He is well-developed.   HENT:      Head: Normocephalic and atraumatic.   Eyes:      Conjunctiva/sclera: Conjunctivae normal.      Pupils: Pupils are equal, round, and reactive to light.   Neck:      Musculoskeletal: Normal range of motion and neck supple.   Cardiovascular:      Rate and Rhythm: Normal rate and regular rhythm.      Heart sounds: Normal heart sounds.   Pulmonary:      Effort: Pulmonary effort is normal.      Breath sounds: Normal breath sounds. No wheezing.   Abdominal:      General: Bowel sounds are normal.      Palpations: Abdomen is soft.      Tenderness: There is no abdominal tenderness.   Musculoskeletal: Normal range of motion.         General: No tenderness.   Skin:     Findings: No erythema.   Neurological:      Mental Status: He is alert and oriented to person, place, and time.      Cranial Nerves: No cranial nerve deficit.         Assessment:       1. Hypertension associated with diabetes    2. Back pain, unspecified back location, unspecified back pain laterality, unspecified chronicity    3. Diabetic polyneuropathy  associated with type 2 diabetes mellitus        Plan:       Yogesh was seen today for hypertension, diabetes and hyperlipidemia.    Diagnoses and all orders for this visit:    Hypertension associated with diabetes  Comments:  stable  Orders:  -     CBC auto differential; Future  -     Comprehensive metabolic panel; Future  -     Lipid Panel; Future  -     Hemoglobin A1C; Future    Back pain, unspecified back location, unspecified back pain laterality, unspecified chronicity  -     HYDROcodone-acetaminophen (NORCO) 5-325 mg per tablet; Take 1 tablet by mouth every 6 (six) hours as needed for Pain. Medically necessary to fill greater than 7 day supply    Diabetic polyneuropathy associated with type 2 diabetes mellitus  Comments:  Hg A1c stable  Orders:  -     CBC auto differential; Future  -     Comprehensive metabolic panel; Future  -     Lipid Panel; Future  -     Hemoglobin A1C; Future            Follow up in about 6 months (around 3/25/2021) for F/U APPOINTMENT WITH ME, WITH LAB BEFORE.

## 2020-10-26 DIAGNOSIS — M54.9 BACK PAIN, UNSPECIFIED BACK LOCATION, UNSPECIFIED BACK PAIN LATERALITY, UNSPECIFIED CHRONICITY: ICD-10-CM

## 2020-10-26 RX ORDER — HYDROCODONE BITARTRATE AND ACETAMINOPHEN 5; 325 MG/1; MG/1
1 TABLET ORAL EVERY 6 HOURS PRN
Qty: 60 TABLET | Refills: 0 | Status: SHIPPED | OUTPATIENT
Start: 2020-10-26 | End: 2020-11-23 | Stop reason: SDUPTHER

## 2020-10-26 NOTE — TELEPHONE ENCOUNTER
----- Message from Nicole Coffey sent at 10/26/2020  9:15 AM CDT -----  Contact: [phone]  Requesting an RX refill or new RX.  Is this a refill or new RX: Refill  RX name and strength:HYDROcodone-acetaminophen (NORCO) 5-325 mg per tablet  Is this a 30 day or 90 day RX: 30  Pharmacy name and phone # (copy/paste from chart):  [id number] - [place] - [address] [phone] (Phone)  [phone] (Fax)  Comments:

## 2020-11-16 ENCOUNTER — TELEPHONE (OUTPATIENT)
Dept: OPHTHALMOLOGY | Facility: CLINIC | Age: 85
End: 2020-11-16

## 2020-11-16 ENCOUNTER — OFFICE VISIT (OUTPATIENT)
Dept: OPHTHALMOLOGY | Facility: CLINIC | Age: 85
End: 2020-11-16
Payer: MEDICARE

## 2020-11-16 DIAGNOSIS — H34.8110 HEMISPHERIC RETINAL VEIN OCCLUSION WITH MACULAR EDEMA OF RIGHT EYE: ICD-10-CM

## 2020-11-16 DIAGNOSIS — H40.033 ANATOMICAL NARROW ANGLE BORDERLINE GLAUCOMA OF BOTH EYES: ICD-10-CM

## 2020-11-16 DIAGNOSIS — E11.9 TYPE 2 DIABETES MELLITUS WITHOUT OPHTHALMIC MANIFESTATIONS: ICD-10-CM

## 2020-11-16 DIAGNOSIS — H25.12 NUCLEAR SCLEROTIC CATARACT OF LEFT EYE: Primary | ICD-10-CM

## 2020-11-16 DIAGNOSIS — H21.81 TAMSULOSIN-ASSOCIATED INTRAOPERATIVE FLOPPY IRIS SYNDROME (IFIS): ICD-10-CM

## 2020-11-16 DIAGNOSIS — H40.053 OCULAR HYPERTENSION, BILATERAL: ICD-10-CM

## 2020-11-16 DIAGNOSIS — H25.13 NS (NUCLEAR SCLEROSIS), BILATERAL: ICD-10-CM

## 2020-11-16 DIAGNOSIS — H40.89 GLAUCOMA DUE TO COMBINATION OF MECHANISMS: ICD-10-CM

## 2020-11-16 DIAGNOSIS — H25.12 NUCLEAR SCLEROSIS, LEFT: Primary | ICD-10-CM

## 2020-11-16 DIAGNOSIS — H57.03 SMALL PUPILS: ICD-10-CM

## 2020-11-16 DIAGNOSIS — Z98.890 S/P LASER IRIDOTOMY: ICD-10-CM

## 2020-11-16 DIAGNOSIS — T44.6X5A TAMSULOSIN-ASSOCIATED INTRAOPERATIVE FLOPPY IRIS SYNDROME (IFIS): ICD-10-CM

## 2020-11-16 DIAGNOSIS — Z13.9 SCREENING PROCEDURE: ICD-10-CM

## 2020-11-16 DIAGNOSIS — H35.033 HYPERTENSIVE RETINOPATHY, BILATERAL: ICD-10-CM

## 2020-11-16 PROCEDURE — 99999 PR PBB SHADOW E&M-EST. PATIENT-LVL III: CPT | Mod: PBBFAC,,, | Performed by: OPHTHALMOLOGY

## 2020-11-16 PROCEDURE — 92014 PR EYE EXAM, EST PATIENT,COMPREHESV: ICD-10-PCS | Mod: S$PBB,,, | Performed by: OPHTHALMOLOGY

## 2020-11-16 PROCEDURE — 92136 OPHTHALMIC BIOMETRY: CPT | Mod: PBBFAC | Performed by: OPHTHALMOLOGY

## 2020-11-16 PROCEDURE — 92136 IOL MASTER - OU - BOTH EYES: ICD-10-PCS | Mod: 26,S$PBB,LT, | Performed by: OPHTHALMOLOGY

## 2020-11-16 PROCEDURE — 99213 OFFICE O/P EST LOW 20 MIN: CPT | Mod: PBBFAC | Performed by: OPHTHALMOLOGY

## 2020-11-16 PROCEDURE — 99999 PR PBB SHADOW E&M-EST. PATIENT-LVL III: ICD-10-PCS | Mod: PBBFAC,,, | Performed by: OPHTHALMOLOGY

## 2020-11-16 PROCEDURE — 92014 COMPRE OPH EXAM EST PT 1/>: CPT | Mod: S$PBB,,, | Performed by: OPHTHALMOLOGY

## 2020-11-16 NOTE — Clinical Note
Complex phaco/IOL os - trypan blue / xiomy ring / dense lens / shallow AC / on flomax - elderly   Call his wife - Jennifer - [phone] // alternative - daughter- aneudy - [phone]  ? Possible dec 16th   H&P done // consent signed - does not need to come in for a separate pre-op

## 2020-11-16 NOTE — H&P (VIEW-ONLY)
Subjective:       Patient ID: Yogesh Shay is a 86 y.o. male.    Chief Complaint: Glaucoma    HPI  Review of Systems   Constitutional: Negative.    HENT: Negative.    Eyes: Positive for visual disturbance.   Respiratory: Negative.    Cardiovascular: Negative.    Gastrointestinal: Negative.    Endocrine: Negative.    Genitourinary: Negative.          Objective:      Physical Exam  HENT:      Head: Normocephalic and atraumatic.   Cardiovascular:      Rate and Rhythm: Normal rate.   Pulmonary:      Effort: Pulmonary effort is normal.   Skin:     General: Skin is warm and dry.   Neurological:      Mental Status: He is alert and oriented to person, place, and time.         Assessment:       1. 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 Anatomical narrow angle borderline glaucoma of both eyes    3. Ocular hypertension, bilateral    4. Hemispheric retinal vein occlusion with macular edema of right eye    5. Hypertensive retinopathy, bilateral    6. NS (nuclear sclerosis), bilateral    7. Small pupils    8. 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 S/P laser iridotomy    10. Type 2 diabetes mellitus without ophthalmic manifestations    11. Nuclear sclerosis, left        Plan:       Complex phaco/IOL os - trypan / xiomy ring / shallow AC / dense lens on flomax

## 2020-11-16 NOTE — PROGRESS NOTES
HPI     DLS: 8/04/20    Pt here for 3 month check;    Meds;   Latanoprost QHS OU       1. MMG   2. Hx Narrow Angles OU   3. NS OU   4. Type 2 DM no BDR   5. Hx Tumor/Cancer RLL     Last edited by Ashli Salazar on 11/16/2020  2:24 PM. (History)        Assessment /Plan     For exam results, see Encounter Report.    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        Glaucoma meds    Latanoprost ou - started 6/15/2018         H/O adverse rxn to glaucoma drops    none        LASERS    Laser PI's 4/2018 - od // OS 5/2018         GLAUCOMA SURGERIES    none        OTHER EYE SURGERIES    none        CDR    0.7/0.4         Tbase    19-27  od // 22-40 os         Tmax     27/40          Ttarget    ?             HVF    2 test 2018 to  2019 - unreliable  od // unreliable  Os (improved ou)         Gonio    +3 w/ steep approach - post PI's ou         CCT    518 / 528        OCT    2 test 2018 to 2020 - RNFL - Full od // dec G/TS os         HRT    2 test 2018 to 2019 - MR - high std dev. od // high std dev os        Disc photos    6/2018     - Ttoday    16/19  (on latanoprost - ( down from 26/40))  - Test done today   IOP / IOL master / cataract check     2. NS ou    Left > right   (also right eye has a HRVO so ? potential    ON FLOWMAX     3. Diabetes    No BDR seen on exam 6/15/2018     4. H/O tumor / cancer RLL    S/P removal and re-construction at [facility]    The biopsy was done by amanda - but the surgery was done by some one else     5. Has some abdominal issues    Having surgery next week - 3/15/2018     6. Episopde of blurry vision ou    At [facility] 2018 - resolved    7. Hemiretinal vein occulusion with macular edema, right eye vs sup BRVO w/   No NVI   On ASA 81 mg    Following with Josezulla       - superior HRVO OD   - has seen Luis Fernando sense then, and no CME at that time, to f/u in 3 months  - saw PCP and started on BP medications       PLAN   For MMG - CPM with latanoprost qHS OU    Cataracts - complex cataract extraction with use of malyugan ring   Hx recent HRVO OD, no ME when seen with Luis Fernando on 8/11/20  - VERY shallow AC and very dense lens / on flomax - small pupil  - high risk for corneal edema and other complications // both cataracts are similar     Recommend  CE IOL OS first then OD - consider CE os - since the right eye has BRVO and higher risk of ME    He and his daughter both desire for pt to have phaco/IOL os - will have surgery coodinator call and schedule - 11/16/2020 . 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

## 2020-11-17 ENCOUNTER — OFFICE VISIT (OUTPATIENT)
Dept: OPHTHALMOLOGY | Facility: CLINIC | Age: 85
End: 2020-11-17
Payer: MEDICARE

## 2020-11-17 DIAGNOSIS — H34.8110 HEMISPHERIC RETINAL VEIN OCCLUSION WITH MACULAR EDEMA OF RIGHT EYE: Primary | ICD-10-CM

## 2020-11-17 PROCEDURE — 92134 CPTRZ OPH DX IMG PST SGM RTA: CPT | Mod: PBBFAC | Performed by: OPHTHALMOLOGY

## 2020-11-17 PROCEDURE — 92014 PR EYE EXAM, EST PATIENT,COMPREHESV: ICD-10-PCS | Mod: 25,S$PBB,, | Performed by: OPHTHALMOLOGY

## 2020-11-17 PROCEDURE — 67028 INJECTION EYE DRUG: CPT | Mod: S$PBB,RT,, | Performed by: OPHTHALMOLOGY

## 2020-11-17 PROCEDURE — 99999 PR PBB SHADOW E&M-EST. PATIENT-LVL III: CPT | Mod: PBBFAC,,, | Performed by: OPHTHALMOLOGY

## 2020-11-17 PROCEDURE — 67028 INJECTION EYE DRUG: CPT | Mod: PBBFAC,LT | Performed by: OPHTHALMOLOGY

## 2020-11-17 PROCEDURE — 99999 PR PBB SHADOW E&M-EST. PATIENT-LVL III: ICD-10-PCS | Mod: PBBFAC,,, | Performed by: OPHTHALMOLOGY

## 2020-11-17 PROCEDURE — 92134 POSTERIOR SEGMENT OCT RETINA (OCULAR COHERENCE TOMOGRAPHY)-BOTH EYES: ICD-10-PCS | Mod: 26,S$PBB,, | Performed by: OPHTHALMOLOGY

## 2020-11-17 PROCEDURE — 67028 PR INJECT INTRAVITREAL PHARMCOLOGIC: ICD-10-PCS | Mod: S$PBB,RT,, | Performed by: OPHTHALMOLOGY

## 2020-11-17 PROCEDURE — 99213 OFFICE O/P EST LOW 20 MIN: CPT | Mod: PBBFAC | Performed by: OPHTHALMOLOGY

## 2020-11-17 PROCEDURE — 92014 COMPRE OPH EXAM EST PT 1/>: CPT | Mod: 25,S$PBB,, | Performed by: OPHTHALMOLOGY

## 2020-11-17 RX ADMIN — BEVACIZUMAB 1.25 MG: 100 INJECTION, SOLUTION INTRAVENOUS at 02:11

## 2020-11-17 NOTE — PROGRESS NOTES
HPI     3 mo OCT   DLS: 8/11//20 Dr. Gould    Pt sts vision declining has Cataract sx set for OS. Needs clearance for OD   un able to get around and hard to make out peoples faces also difficult to   drive.     Meds;   Latanoprost QHS OU           OCT - OD -increase in ME  OS - no ME      A/P    1. Superior HRVO OD  increase CME today    Avastin OD today    2. HTN Ret OU  BP control    3. NS OU  Ok for CE  Pt aware of increased risk of CME OD post CE    4. MMG OU  Mgmt per Dr. Romero      1 month Avastin OD only    Risks, benefits, and alternatives to treatment discussed in detail with the patient.  The patient voiced understanding and wished to proceed with the procedure    Injection Procedure Note:  Diagnosis: HRVO with ME OD    Patient Identified and Time Out complete  Pt Prefers to be marked with sticker rather than ink marker ([facility].[id number] #5)  Topical Proparacaine and Betadine.  Inject Avastin OD at 6:00 @ 3.5-4mm posterior to limbus  Post Operative Dx: Same  Complications: None  Follow up as above.

## 2020-11-17 NOTE — PATIENT INSTRUCTIONS

## 2020-11-18 ENCOUNTER — TELEPHONE (OUTPATIENT)
Dept: INTERNAL MEDICINE | Facility: CLINIC | Age: 85
End: 2020-11-18

## 2020-11-18 NOTE — TELEPHONE ENCOUNTER
----- Message from Blanca Weiss MA sent at 11/17/2020  4:10 PM CST -----    ----- Message -----  From: Barry Sandoval  Sent: 11/17/2020   3:34 PM CST  To: [facility] Burgos Staff    Mr. Shay is scheduled for cataract surgery with Dr. Soriano on 12/16/2020.  Pt will need medical clearance for this procedure.  Surgery is scheduled with local/MAC anesthesia.    Please call if you have any questions.    Thanks,   Barry Sandoval  Surgery Coordinator  Ophthalmology  [facility] [[id number]]  [id number]

## 2020-11-23 DIAGNOSIS — M54.9 BACK PAIN, UNSPECIFIED BACK LOCATION, UNSPECIFIED BACK PAIN LATERALITY, UNSPECIFIED CHRONICITY: ICD-10-CM

## 2020-11-23 RX ORDER — HYDROCODONE BITARTRATE AND ACETAMINOPHEN 5; 325 MG/1; MG/1
1 TABLET ORAL EVERY 6 HOURS PRN
Qty: 60 TABLET | Refills: 0 | Status: SHIPPED | OUTPATIENT
Start: 2020-11-23 | End: 2020-12-22 | Stop reason: SDUPTHER

## 2020-11-23 NOTE — TELEPHONE ENCOUNTER
----- Message from Victoria Jacobsen sent at 11/23/2020  8:35 AM CST -----  Contact: Lida spouse [phone]  Type:  RX Refill Request    Who Called: Lida spouse  Refill or New Rx: refill  RX Name and Strength: HYDROcodone-acetaminophen (NORCO) 5-325 mg per tablet  How is the patient currently taking it? (ex. 1XDay)  Is this a 30 day or 90 day RX:  Preferred Pharmacy with phone number: [id number] - [place]  [address] [phone] (Phone)   Local or Mail Order: local  Ordering Provider: Dr Root  Would the patient rather a call back or a response via [facility]? Call back  Best Call Back Number: [phone]  Additional Information:

## 2020-12-04 RX ORDER — KETOROLAC TROMETHAMINE 5 MG/ML
1 SOLUTION OPHTHALMIC
Status: CANCELLED | OUTPATIENT
Start: 2020-12-04

## 2020-12-04 RX ORDER — PHENYLEPHRINE HYDROCHLORIDE 100 MG/ML
1 SOLUTION/ DROPS OPHTHALMIC
Status: CANCELLED | OUTPATIENT
Start: 2020-12-04

## 2020-12-04 RX ORDER — MOXIFLOXACIN 5 MG/ML
1 SOLUTION/ DROPS OPHTHALMIC
Status: CANCELLED | OUTPATIENT
Start: 2020-12-04

## 2020-12-04 RX ORDER — TROPICAMIDE 10 MG/ML
1 SOLUTION/ DROPS OPHTHALMIC
Status: CANCELLED | OUTPATIENT
Start: 2020-12-04

## 2020-12-04 RX ORDER — SODIUM CHLORIDE 0.9 % (FLUSH) 0.9 %
10 SYRINGE (ML) INJECTION
Status: DISCONTINUED | OUTPATIENT
Start: 2020-12-04 | End: 2020-12-23

## 2020-12-14 ENCOUNTER — LAB VISIT (OUTPATIENT)
Dept: FAMILY MEDICINE | Facility: CLINIC | Age: 85
End: 2020-12-14
Payer: MEDICARE

## 2020-12-14 DIAGNOSIS — Z13.9 SCREENING PROCEDURE: ICD-10-CM

## 2020-12-14 PROCEDURE — U0003 INFECTIOUS AGENT DETECTION BY NUCLEIC ACID (DNA OR RNA); SEVERE ACUTE RESPIRATORY SYNDROME CORONAVIRUS 2 (SARS-COV-2) (CORONAVIRUS DISEASE [COVID-19]), AMPLIFIED PROBE TECHNIQUE, MAKING USE OF HIGH THROUGHPUT TECHNOLOGIES AS DESCRIBED BY CMS-2020-01-R: HCPCS

## 2020-12-15 ENCOUNTER — TELEPHONE (OUTPATIENT)
Dept: OPTOMETRY | Facility: CLINIC | Age: 85
End: 2020-12-15

## 2020-12-15 LAB — SARS-COV-2 RNA RESP QL NAA+PROBE: NOT DETECTED

## 2020-12-15 NOTE — PRE-PROCEDURE INSTRUCTIONS
PREOP INSTRUCTIONS:No solid food ,milk or milk products for 8 hours prior to procedure.Clear liquids are allowed up to 2 hours before procedure.Clear liquids are:water,apple juice,gatorade & powerade.Instructed to follow the surgeon's instructions if they differ from these.Shower instructions as well as directions to the Surgery Center were given.Encouraged to wear loose fitting,comfortable clothing.Medication instructions for pm prior to and am of procedure reviewed.Instructed to avoid taking vitamins,supplements,aspirin and ibuprofen the morning of surgery. Patient's questions and concerns addressed .Patient informed of the current visitor policy and advised patient that one visitor may accompany each patient into the hospital and wait (socially distanced) until a member of the medical team provides an update at the conclusion of the procedure.When they enter the hospital both patient and visitor will have their temperature checked.All visitors are asked to arrive with a mask and to keep their mask on throughout the visit.      Patient denies any side effects or issues with anesthesia or sedation.      0900 ARRIVAL TO DELMAR SINGER.    DAUGHTER - YOAN GATES WILL BE PROVIDING TRANSPORTATION HOME UPON DISCHARGE.

## 2020-12-15 NOTE — PROGRESS NOTES
HPI     DLS: 11/17/2020 - Dr. Gould    Pt here today for 1 day post op OS. Doing well!    Meds:  Latanoprost QHS OU       1. Superior HRVO OD  Minimal CME today     obs today     2. HTN Ret OU  BP control     3. NS OD  Ok for CE  Pt aware of increased risk of CME OD post CE     4. MMG OU  Mgmt per Dr. Romero    5. S/p phaco OS 12/16/2020    Last edited by Colleen Kearns on 12/17/2020 10:31 AM. (History)              Assessment /Plan     For exam results, see Encounter Report.    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    Ocular hypertension, bilateral    Hemispheric retinal vein occlusion with macular edema of right eye    Nuclear sclerotic cataract of left eye    Hypertensive retinopathy, bilateral    NS (nuclear sclerosis), bilateral    Small pupils    S/P laser iridotomy        RETIRED FROM THE [place] [employer] // STILL HAS A [place] THAT HE FARMS   VERY ACTIVE STILL          Glaucoma (type and duration)    Narrow angles - S/P PI's ou // + residual open angle glaucoma -   First HVF   8/2018 - poor VF test taker    First photos   6/2018   Treatment / Drops started   Lat ou - started 6/15/2018            Family history    ?        Glaucoma meds    Latanoprost ou - started 6/15/2018         H/O adverse rxn to glaucoma drops    none        LASERS    Laser PI's 4/2018 - od // OS 5/2018         GLAUCOMA SURGERIES    none        OTHER EYE SURGERIES    none        CDR    0.7/0.4         Tbase    19-27  od // 22-40 os         Tmax     27/40          Ttarget    ?             HVF    2 test 2018 to  2019 - unreliable  od // unreliable  Os (improved ou)         Gonio    +3 w/ steep approach - post PI's ou         CCT    518 / 528        OCT    2 test 2018 to 2020 - RNFL - Full od // dec G/TS os         HRT    2 test 2018 to 2019 - MR - high std dev. od // high std dev os        Disc photos    6/2018     - Ttoday    ??/22  (on latanoprost - ( down from 26/40))  - Test done today - post  op phaco/IOL os - 12/16/2020    2. NS ou    Left > right  (also right eye has a HRVO so ? potential    ON FLOWMAX     3. Diabetes    No BDR seen on exam 6/15/2018     4. H/O tumor / cancer RLL    S/P removal and re-construction at [facility]    The biopsy was done by amanda - but the surgery was done by some one else     5. Has some abdominal issues    Having surgery next week - 3/15/2018     6. Episopde of blurry vision ou    At [facility] 2018 - resolved    7. Hemiretinal vein occulusion with macular edema, right eye vs sup BRVO w/   No NVI   On ASA 81 mg    Following with [id number]       - superior HRVO OD   - has seen Luis Fernando sense then, and no CME at that time, to f/u in 3 months  - saw PCP and started on BP medications       PLAN   For MMG - CPM with latanoprost qHS OU    Cataracts - complex cataract extraction with use of malyugan ring   Hx recent HRVO OD, no ME when seen with Luis Fernando on 8/11/20  - VERY shallow AC and very dense lens / on flomax - small pupil  - high risk for corneal edema and other complications // both cataracts are similar     Phaco / IOL OS Date: 12/16/2020  POD # 1 - phaco/IOL   Doing well  Begin Pred Acetate QID   Begin vigamox QID  Shield at night  Glasses day  No lifting, no bending, no eye rubbing  F/U 1 week, AR/MR ou    Hold latanoprost os // cont od - if IOP goes up od - can try dorzolamide or brimonidine     Recommend  CE IOL OS first then OD - consider CE os - since the right eye has BRVO and higher risk of ME    IOL calc - OS   Reyes  OD ZCBOO 24.00, target -0.55  OS ZCBOO 23.50, target -0.60

## 2020-12-16 ENCOUNTER — ANESTHESIA (OUTPATIENT)
Dept: SURGERY | Facility: HOSPITAL | Age: 85
End: 2020-12-16
Payer: MEDICARE

## 2020-12-16 ENCOUNTER — ANESTHESIA EVENT (OUTPATIENT)
Dept: SURGERY | Facility: HOSPITAL | Age: 85
End: 2020-12-16
Payer: MEDICARE

## 2020-12-16 ENCOUNTER — HOSPITAL ENCOUNTER (OUTPATIENT)
Facility: HOSPITAL | Age: 85
Discharge: HOME | End: 2020-12-16
Attending: OPHTHALMOLOGY | Admitting: OPHTHALMOLOGY
Payer: MEDICARE

## 2020-12-16 VITALS
DIASTOLIC BLOOD PRESSURE: 91 MMHG | TEMPERATURE: 98 F | HEART RATE: 81 BPM | OXYGEN SATURATION: 97 % | WEIGHT: 137 LBS | HEIGHT: 66 IN | RESPIRATION RATE: 26 BRPM | SYSTOLIC BLOOD PRESSURE: 187 MMHG | BODY MASS INDEX: 22.02 KG/M2

## 2020-12-16 DIAGNOSIS — H25.12 NUCLEAR SCLEROSIS, LEFT: ICD-10-CM

## 2020-12-16 DIAGNOSIS — H57.03 SMALL PUPILS: ICD-10-CM

## 2020-12-16 DIAGNOSIS — H40.89 GLAUCOMA DUE TO COMBINATION OF MECHANISMS: ICD-10-CM

## 2020-12-16 DIAGNOSIS — H21.81 INTRAOPERATIVE FLOPPY IRIS SYNDROME (IFIS): ICD-10-CM

## 2020-12-16 DIAGNOSIS — H25.12 NUCLEAR SCLEROTIC CATARACT OF LEFT EYE: Primary | ICD-10-CM

## 2020-12-16 LAB
POCT GLUCOSE: 107 MG/DL (ref 70–110)
POCT GLUCOSE: 124 MG/DL (ref 70–110)

## 2020-12-16 PROCEDURE — 66982 XCAPSL CTRC RMVL CPLX WO ECP: CPT | Mod: LT,,, | Performed by: OPHTHALMOLOGY

## 2020-12-16 PROCEDURE — D9220A PRA ANESTHESIA: ICD-10-PCS | Mod: CRNA,,, | Performed by: NURSE ANESTHETIST, CERTIFIED REGISTERED

## 2020-12-16 PROCEDURE — 37000009 HC ANESTHESIA EA ADD 15 MINS: Performed by: OPHTHALMOLOGY

## 2020-12-16 PROCEDURE — 82962 GLUCOSE BLOOD TEST: CPT | Performed by: OPHTHALMOLOGY

## 2020-12-16 PROCEDURE — D9220A PRA ANESTHESIA: ICD-10-PCS | Mod: ANES,,, | Performed by: STUDENT IN AN ORGANIZED HEALTH CARE EDUCATION/TRAINING PROGRAM

## 2020-12-16 PROCEDURE — D9220A PRA ANESTHESIA: Mod: ANES,,, | Performed by: STUDENT IN AN ORGANIZED HEALTH CARE EDUCATION/TRAINING PROGRAM

## 2020-12-16 PROCEDURE — 36000706: Performed by: OPHTHALMOLOGY

## 2020-12-16 PROCEDURE — 25000003 PHARM REV CODE 250: Performed by: NURSE ANESTHETIST, CERTIFIED REGISTERED

## 2020-12-16 PROCEDURE — 27201423 OPTIME MED/SURG SUP & DEVICES STERILE SUPPLY: Performed by: OPHTHALMOLOGY

## 2020-12-16 PROCEDURE — 37000008 HC ANESTHESIA 1ST 15 MINUTES: Performed by: OPHTHALMOLOGY

## 2020-12-16 PROCEDURE — 63600175 PHARM REV CODE 636 W HCPCS: Performed by: OPHTHALMOLOGY

## 2020-12-16 PROCEDURE — D9220A PRA ANESTHESIA: Mod: CRNA,,, | Performed by: NURSE ANESTHETIST, CERTIFIED REGISTERED

## 2020-12-16 PROCEDURE — 36000707: Performed by: OPHTHALMOLOGY

## 2020-12-16 PROCEDURE — 63600175 PHARM REV CODE 636 W HCPCS: Performed by: NURSE ANESTHETIST, CERTIFIED REGISTERED

## 2020-12-16 PROCEDURE — 71000044 HC DOSC ROUTINE RECOVERY FIRST HOUR: Performed by: OPHTHALMOLOGY

## 2020-12-16 PROCEDURE — 25000003 PHARM REV CODE 250: Performed by: OPHTHALMOLOGY

## 2020-12-16 PROCEDURE — 66982 PR REMOVAL, CATARACT, W/INSRT INTRAOC LENS, W/O ENDO CYCLO, CMPLX: ICD-10-PCS | Mod: LT,,, | Performed by: OPHTHALMOLOGY

## 2020-12-16 PROCEDURE — V2632 POST CHMBR INTRAOCULAR LENS: HCPCS | Performed by: OPHTHALMOLOGY

## 2020-12-16 PROCEDURE — 71000015 HC POSTOP RECOV 1ST HR: Performed by: OPHTHALMOLOGY

## 2020-12-16 DEVICE — LENS IOL ITEC PRELOAD 24.0D: Type: IMPLANTABLE DEVICE | Site: EYE | Status: FUNCTIONAL

## 2020-12-16 RX ORDER — SODIUM CHLORIDE 0.9 % (FLUSH) 0.9 %
10 SYRINGE (ML) INJECTION
Status: DISCONTINUED | OUTPATIENT
Start: 2020-12-16 | End: 2020-12-16 | Stop reason: HOSPADM

## 2020-12-16 RX ORDER — MOXIFLOXACIN 5 MG/ML
1 SOLUTION/ DROPS OPHTHALMIC
Status: DISCONTINUED | OUTPATIENT
Start: 2020-12-16 | End: 2020-12-16 | Stop reason: HOSPADM

## 2020-12-16 RX ORDER — SODIUM CHLORIDE 9 MG/ML
INJECTION, SOLUTION INTRAVENOUS CONTINUOUS PRN
Status: DISCONTINUED | OUTPATIENT
Start: 2020-12-16 | End: 2020-12-16

## 2020-12-16 RX ORDER — LIDOCAINE HYDROCHLORIDE 20 MG/ML
JELLY TOPICAL
Status: DISCONTINUED | OUTPATIENT
Start: 2020-12-16 | End: 2020-12-16 | Stop reason: HOSPADM

## 2020-12-16 RX ORDER — FENTANYL CITRATE 50 UG/ML
25 INJECTION, SOLUTION INTRAMUSCULAR; INTRAVENOUS EVERY 5 MIN PRN
Status: DISCONTINUED | OUTPATIENT
Start: 2020-12-16 | End: 2020-12-16 | Stop reason: HOSPADM

## 2020-12-16 RX ORDER — LIDOCAINE HYDROCHLORIDE 10 MG/ML
INJECTION, SOLUTION EPIDURAL; INFILTRATION; INTRACAUDAL; PERINEURAL
Status: DISCONTINUED | OUTPATIENT
Start: 2020-12-16 | End: 2020-12-16 | Stop reason: HOSPADM

## 2020-12-16 RX ORDER — FENTANYL CITRATE 50 UG/ML
INJECTION, SOLUTION INTRAMUSCULAR; INTRAVENOUS
Status: DISCONTINUED | OUTPATIENT
Start: 2020-12-16 | End: 2020-12-16

## 2020-12-16 RX ORDER — PREDNISOLONE ACETATE 10 MG/ML
SUSPENSION/ DROPS OPHTHALMIC
Status: DISCONTINUED | OUTPATIENT
Start: 2020-12-16 | End: 2020-12-16 | Stop reason: HOSPADM

## 2020-12-16 RX ORDER — ONDANSETRON 2 MG/ML
4 INJECTION INTRAMUSCULAR; INTRAVENOUS ONCE AS NEEDED
Status: DISCONTINUED | OUTPATIENT
Start: 2020-12-16 | End: 2020-12-16 | Stop reason: HOSPADM

## 2020-12-16 RX ORDER — SODIUM CHLORIDE 9 MG/ML
INJECTION, SOLUTION INTRAVENOUS CONTINUOUS
Status: DISCONTINUED | OUTPATIENT
Start: 2020-12-16 | End: 2020-12-16 | Stop reason: HOSPADM

## 2020-12-16 RX ORDER — LABETALOL HYDROCHLORIDE 5 MG/ML
INJECTION, SOLUTION INTRAVENOUS
Status: DISCONTINUED | OUTPATIENT
Start: 2020-12-16 | End: 2020-12-16

## 2020-12-16 RX ORDER — PHENYLEPHRINE HYDROCHLORIDE 100 MG/ML
1 SOLUTION/ DROPS OPHTHALMIC
Status: DISCONTINUED | OUTPATIENT
Start: 2020-12-16 | End: 2020-12-16 | Stop reason: HOSPADM

## 2020-12-16 RX ORDER — LIDOCAINE HYDROCHLORIDE 40 MG/ML
INJECTION, SOLUTION RETROBULBAR
Status: DISCONTINUED | OUTPATIENT
Start: 2020-12-16 | End: 2020-12-16 | Stop reason: HOSPADM

## 2020-12-16 RX ORDER — TROPICAMIDE 10 MG/ML
1 SOLUTION/ DROPS OPHTHALMIC
Status: DISCONTINUED | OUTPATIENT
Start: 2020-12-16 | End: 2020-12-16 | Stop reason: HOSPADM

## 2020-12-16 RX ORDER — ACETAMINOPHEN 325 MG/1
650 TABLET ORAL EVERY 6 HOURS PRN
Status: DISCONTINUED | OUTPATIENT
Start: 2020-12-16 | End: 2020-12-16 | Stop reason: HOSPADM

## 2020-12-16 RX ORDER — KETOROLAC TROMETHAMINE 5 MG/ML
1 SOLUTION OPHTHALMIC
Status: DISCONTINUED | OUTPATIENT
Start: 2020-12-16 | End: 2020-12-16 | Stop reason: HOSPADM

## 2020-12-16 RX ORDER — ACETAZOLAMIDE 500 MG/1
500 CAPSULE, EXTENDED RELEASE ORAL ONCE
Status: COMPLETED | OUTPATIENT
Start: 2020-12-16 | End: 2020-12-16

## 2020-12-16 RX ADMIN — KETOROLAC TROMETHAMINE 1 DROP: 5 SOLUTION/ DROPS OPHTHALMIC at 09:12

## 2020-12-16 RX ADMIN — TROPICAMIDE 1 DROP: 10 SOLUTION/ DROPS OPHTHALMIC at 09:12

## 2020-12-16 RX ADMIN — Medication 10 MG: at 10:12

## 2020-12-16 RX ADMIN — ACETAZOLAMIDE 500 MG: 500 CAPSULE, EXTENDED RELEASE ORAL at 11:12

## 2020-12-16 RX ADMIN — SODIUM CHLORIDE: 9 INJECTION, SOLUTION INTRAVENOUS at 10:12

## 2020-12-16 RX ADMIN — MOXIFLOXACIN 1 DROP: 5 SOLUTION/ DROPS OPHTHALMIC at 09:12

## 2020-12-16 RX ADMIN — FENTANYL CITRATE 25 MCG: 50 INJECTION INTRAMUSCULAR; INTRAVENOUS at 10:12

## 2020-12-16 RX ADMIN — PHENYLEPHRINE HYDROCHLORIDE 1 DROP: 100 SOLUTION/ DROPS OPHTHALMIC at 09:12

## 2020-12-16 RX ADMIN — SODIUM CHLORIDE 10 ML/HR: 0.9 INJECTION, SOLUTION INTRAVENOUS at 10:12

## 2020-12-16 NOTE — DISCHARGE SUMMARY
[facility]  Discharge Note  Short Stay    Procedure(s) (LRB):  PHACOEMULSIFICATION, CATARACT (Left)  INSERTION, IOL PROSTHESIS (Left)    OUTCOME: Patient tolerated treatment/procedure well without complication and is now ready for discharge.    DISPOSITION: Home or Self Care    FINAL DIAGNOSIS:  Nuclear sclerotic cataract of left eye    FOLLOWUP: In clinic    DISCHARGE INSTRUCTIONS:  No discharge procedures on file.

## 2020-12-16 NOTE — OP NOTE
DATE OF PROCEDURE: 12/16/2020    PREOPERATIVE DIAGNOSIS: Complex/small pupil/floppy iris Cataract of the OS. Nuclear sclerotic cataract of left eye     POSTOPERATIVE DIAGNOSIS: Complex/small pupil/floppy iris Cataract of theOS, status post procedure. Nuclear sclerotic cataract of left eye    PROCEDURE PERFORMED: Complex Cataract extraction with trypan blue and  Malyugin ring ,phacoemulsification, and posterior chamber intraocular lens placement.     SURGEON: Danika Romero M.D.     ASSISTANT: Nicki morin MD      COMPLICATIONS: None.     BLOOD LOSS: Less than 5 mL.     ANESTHESIA: Local MAC    IMPLANT DATA:   1. Abbott PCB00 , power 24.0 diopters, serial #    PROCEDURE IN DETAIL: After informed consent including risks, benefits, alternatives, the patient was brought to the operating room table, placed in supine position. Monitored anesthesia care was used throughout the entire procedure. Once adequate anesthesia was confirmed, the patient was then prepped and draped in usual sterile fashion for intraocular surgery. Wire speculum was used to hold the eyelids apart and the procedure was initiated by making a partial thickness corneal wound with a jerri blade temporally.  supersharp blade was then used to make a second entry into the eye via a paracentesis incision. Intracameral lidocaine followed by trypan blue, followed by  Viscoat were placed in the anterior chamber. A 2.4 mm blade was then used to make to complete the clear corneal incision temporally. A Malyugin ring was inserted to dilated and maintain pupillary dilation.  A cystotome was used to make a tear in the anterior capsular flap, which was continued around with Utrata forceps for continuous curvilinear capsulorrhexis. BSS on a Ocampo cannula was then used for hydrodissection and hydrodelineation of lens. The lens was noted to spin freely in the bag. Phacoemulsification handpiece was then used to remove the lens in a divide-and-conquer manner.  Irrigation aspiration handpiece removed   the remaining cortical material. Provisc was placed in the eye followed by the lens as mentioned above without any complications. Once the lens was in proper Position, The Malyugin was disengaged from the iris and removed from the eye.  The irrigation aspiration handpiece was used to remove the remaining Provisc. A 10-0 vicryl suture was placed in the corneal incision . The wounds were then hydrated with BSS and noted to be watertight. The eye had a good physiological pressure and the lid speculum was removed under the microscope without any shallowing. The eye was then covered with a collagen shield soaked in Pred Forte and Vigamox and turned over to Anesthesia in stable condition after placement of patch and shield.

## 2020-12-16 NOTE — TRANSFER OF CARE
"Anesthesia Transfer of Care Note    Patient: Yogesh Shay    Procedure(s) Performed: Procedure(s) (LRB):  PHACOEMULSIFICATION, CATARACT (Left)  INSERTION, IOL PROSTHESIS (Left)    Patient location: PACU    Anesthesia Type: MAC    Transport from OR: Transported from OR on room air with adequate spontaneous ventilation    Post pain: adequate analgesia    Post assessment: no apparent anesthetic complications and tolerated procedure well    Post vital signs: stable    Level of consciousness: awake and alert    Nausea/Vomiting: no nausea/vomiting    Complications: none    Transfer of care protocol was followed      Last vitals:   Visit Vitals  BP (!) 180/94 (BP Location: Left arm, Patient Position: Lying)   Pulse 79   Temp 36.6 °C (97.9 °F) (Temporal)   Resp 15   Ht 5' 6" (1.676 m)   Wt 62.1 kg (137 lb)   SpO2 95%   BMI 22.11 kg/m²     "

## 2020-12-16 NOTE — ANESTHESIA PREPROCEDURE EVALUATION
12/16/2020  Yogesh Shay is a 86 y.o., male.    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 12/16/2020    Hemispheric retinal vein occlusion with macular edema of right eye 08/11/2020    Hypertensive retinopathy, bilateral 08/11/2020    NS (nuclear sclerosis), bilateral 08/11/2020    Malnutrition 08/07/2020    Atherosclerosis of aorta 12/19/2019    Epigastric pain 10/26/2018    Hypertension associated with diabetes 03/10/2017    Diabetic polyneuropathy associated with type 2 diabetes mellitus 08/31/2015    GERD (gastroesophageal reflux disease) 01/30/2015    AR (allergic rhinitis) 01/30/2015    Hyperlipidemia associated with type 2 diabetes mellitus 12/05/2013    Diabetes mellitus with neurological manifestations, controlled 03/22/2013    Chronic LBP 08/08/2012     S/P L3-L4 Transforaminal Lumbar Interbody Fusion 11/2007  Dx updated per 2019 IMO Load      Left lumbar radiculopathy 08/08/2012    Primary osteoarthritis of both knees 08/08/2012     S/P Lt TKA 2/10/2010            Peripheral IV - Single Lumen 12/16/20 0923 20 G Left Antecubital (Active)   Number of days: 0       Facility-Administered Medications Prior to Admission   Medication Dose Route Frequency Provider Last Rate Last Dose    sodium chloride 0.9% flush 10 mL  10 mL Intravenous PRN Danika Romero MD         Medications Prior to Admission   Medication Sig Dispense Refill Last Dose    acetaminophen (TYLENOL EXTRA STRENGTH) 500 MG tablet Take 500 mg by mouth every 6 (six) hours as needed for Pain.   Past Week at Unknown time    aspirin 81 mg Tab Take by mouth every evening. 1 Tablet Oral Every day   12/2/2020 at Unknown time    atenolol (TENORMIN) 50 MG tablet TAKE ONE AND ONE-HALF TABLETS TWICE A  tablet 4 12/16/2020 at 0530    gabapentin  (NEURONTIN) 300 MG capsule TAKE 1 CAPSULE EVERY EVENING AT BEDTIME 90 capsule 3 Past Week at Unknown time    hydroCHLOROthiazide (HYDRODIURIL) 25 MG tablet TAKE 1 TABLET DAILY 90 tablet 3 Past Week at Unknown time    HYDROcodone-acetaminophen (NORCO) 5-325 mg per tablet Take 1 tablet by mouth every 6 (six) hours as needed for Pain. Medically necessary to fill greater than 7 day supply 60 tablet 0 Past Week at Unknown time    lisinopriL (PRINIVIL,ZESTRIL) 20 MG tablet Take 1 tablet (20 mg total) by mouth once daily. 90 tablet 3 12/15/2020 at 0800    metFORMIN (GLUCOPHAGE) 500 MG tablet TAKE 1 TABLET TWICE A DAY WITH MEALS 180 tablet 3 Past Week at Unknown time    omeprazole (PRILOSEC) 20 MG capsule TAKE 1 CAPSULE DAILY 90 capsule 4 12/16/2020 at 0530    pravastatin (PRAVACHOL) 10 MG tablet TAKE 1 TABLET DAILY 90 tablet 3 Past Week at Unknown time    tamsulosin (FLOMAX) 0.4 mg Cap TAKE 1 CAPSULE DAILY 90 capsule 4 Past Week at Unknown time    diclofenac (CATAFLAM) 50 MG tablet TAKE 1 TABLET THREE TIMES A  tablet 3 Unknown at Unknown time    latanoprost 0.005 % ophthalmic solution Place 1 drop into both eyes once daily. 2.5 mL 12     meloxicam (MOBIC) 7.5 MG tablet TAKE 1 TABLET BY MOUTH EVERY DAY 30 tablet 12 Unknown at Unknown time    ondansetron (ZOFRAN-ODT) 8 MG TbDL DISSOLVE 1 TABLET ON TONGUE EVERY 8 HOURS AS NEEDED FOR NAUSEA 30 tablet 0 More than a month at Unknown time    polyethylene glycol (GLYCOLAX) 17 gram/dose powder Take 17 g by mouth once daily. 510 g 3 More than a month at Unknown time    triamcinolone acetonide 0.1% (KENALOG) 0.1 % cream Apply topically 2 (two) times daily. 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 2020    Hyperlipidemia     Hypertension     Left lumbar radiculopathy 2012    Osteoarthritis 2012    Primary osteoarthritis of both knees 2012     Past Surgical History:   Procedure Laterality Date    APPENDECTOMY      CHOLECYSTECTOMY      COLON SURGERY      diverticulitis    COLONOSCOPY      ESOPHAGOGASTRODUODENOSCOPY N/A 10/26/2018    Procedure: ESOPHAGOGASTRODUODENOSCOPY (EGD);  Surgeon: Sanya Alcantar MD;  Location: [facility] ([address] [place] [address]);  Service: Endoscopy;  Laterality: N/A;    EXCISION LID LESION Right n/a    REMOVAL AND RECONSTRUCTION OF TUMOR RLL ( @ [facility])    HERNIA REPAIR Left     open lih with mesh    LASER PERIPHERAL IRIDOTOMY Bilateral 2018    DR. PIERSON    LUMBAR FUSION  2007     L3-L4 Transforaminal Lumbar Interbody Fusion    TOTAL KNEE ARTHROPLASTY  2010    Left     Tobacco Use    Smoking status: Former Smoker     Types: Cigarettes     Quit date: 1975     Years since quittin.2    Smokeless tobacco: Former User   Substance and Sexual Activity    Alcohol use: No    Drug use: No    Sexual activity: Not on file       Objective:     Vital Signs (Most Recent):  Temp: 36.9 °C (98.4 °F) (20 0900)  Pulse: 69 (20 0900)  Resp: 16 (20 0900)  BP: (!) 192/99 (20 0900)  SpO2: 99 % (20 0900) Vital Signs (24h Range):  Temp:  [36.9 °C (98.4 °F)] 36.9 °C (98.4 °F)  Pulse:  [69] 69  Resp:  [16] 16  SpO2:  [99 %] 99 %  BP: (192)/(99) 192/99     Weight: 62.1 kg (137 lb)  Body mass index is 22.11 kg/m².        Significant Labs:  All pertinent labs from the last 24 hours have been reviewed.    CBC: No results for  input(s): WBC, RBC, HGB, HCT, PLT, MCV, MCH, MCHC in the last 72 hours.    CMP: No results for input(s): NA, K, CL, CO2, BUN, CREATININE, GLU, MG, PHOS, CALCIUM, ALBUMIN, PROT, ALKPHOS, ALT, AST, BILITOT in the last 72 hours.    INR  No results for input(s): PT, INR, PROTIME, APTT in the last 72 hours.      Anesthesia Evaluation    I have reviewed the Patient Summary Reports.     I have reviewed the Nursing Notes. I have reviewed the NPO Status.   I have reviewed the Medications.     Review of Systems  Anesthesia Hx:   Denies Personal Hx of Anesthesia complications.       Physical Exam  General:  Well nourished    Airway/Jaw/Neck:  Airway Findings: Mouth Opening: Normal Tongue: Normal  General Airway Assessment: Adult  Mallampati: I  TM Distance: Normal, at least 6 cm  Jaw/Neck Findings:     Neck ROM: Normal ROM      Dental:  Dental Findings: Edentulous   Chest/Lungs:  Chest/Lungs Findings: Clear to auscultation, Normal Respiratory Rate     Heart/Vascular:  Heart Findings: Rate: Normal  Rhythm: Regular Rhythm  Sounds: Normal        Mental Status:  Mental Status Findings:  Cooperative, Alert and Oriented         Anesthesia Plan  Type of Anesthesia, risks & benefits discussed:  Anesthesia Type:  MAC  Patient's Preference:   Intra-op Monitoring Plan: standard ASA monitors  Intra-op Monitoring Plan Comments:   Post Op Pain Control Plan: IV/PO Opioids PRN, per primary service following discharge from PACU and multimodal analgesia  Post Op Pain Control Plan Comments:   Induction:   IV  Beta Blocker:  Patient is not currently on a Beta-Blocker (No further documentation required).       Informed Consent: Patient understands risks and agrees with Anesthesia plan.  Questions answered. Anesthesia consent signed with patient.  ASA Score: 3     Day of Surgery Review of History & Physical:    H&P update referred to the surgeon.         Ready For Surgery From Anesthesia Perspective.

## 2020-12-17 ENCOUNTER — OFFICE VISIT (OUTPATIENT)
Dept: OPHTHALMOLOGY | Facility: CLINIC | Age: 85
End: 2020-12-17
Payer: MEDICARE

## 2020-12-17 DIAGNOSIS — H40.053 OCULAR HYPERTENSION, BILATERAL: ICD-10-CM

## 2020-12-17 DIAGNOSIS — H34.8110 HEMISPHERIC RETINAL VEIN OCCLUSION WITH MACULAR EDEMA OF RIGHT EYE: ICD-10-CM

## 2020-12-17 DIAGNOSIS — H25.13 NS (NUCLEAR SCLEROSIS), BILATERAL: ICD-10-CM

## 2020-12-17 DIAGNOSIS — Z98.890 S/P LASER IRIDOTOMY: ICD-10-CM

## 2020-12-17 DIAGNOSIS — Z98.49 POSTOPERATIVE CARE FOR CATARACT: Primary | ICD-10-CM

## 2020-12-17 DIAGNOSIS — H40.89 GLAUCOMA DUE TO COMBINATION OF MECHANISMS: ICD-10-CM

## 2020-12-17 DIAGNOSIS — Z48.810 POSTOPERATIVE CARE FOR CATARACT: Primary | ICD-10-CM

## 2020-12-17 DIAGNOSIS — H57.03 SMALL PUPILS: ICD-10-CM

## 2020-12-17 DIAGNOSIS — H35.033 HYPERTENSIVE RETINOPATHY, BILATERAL: ICD-10-CM

## 2020-12-17 DIAGNOSIS — H25.12 NUCLEAR SCLEROTIC CATARACT OF LEFT EYE: ICD-10-CM

## 2020-12-17 PROCEDURE — 99024 POSTOP FOLLOW-UP VISIT: CPT | Mod: POP,,, | Performed by: OPHTHALMOLOGY

## 2020-12-17 PROCEDURE — 99213 OFFICE O/P EST LOW 20 MIN: CPT | Mod: PBBFAC | Performed by: OPHTHALMOLOGY

## 2020-12-17 PROCEDURE — 99999 PR PBB SHADOW E&M-EST. PATIENT-LVL III: ICD-10-PCS | Mod: PBBFAC,,, | Performed by: OPHTHALMOLOGY

## 2020-12-17 PROCEDURE — 99024 PR POST-OP FOLLOW-UP VISIT: ICD-10-PCS | Mod: POP,,, | Performed by: OPHTHALMOLOGY

## 2020-12-17 PROCEDURE — 99999 PR PBB SHADOW E&M-EST. PATIENT-LVL III: CPT | Mod: PBBFAC,,, | Performed by: OPHTHALMOLOGY

## 2020-12-17 NOTE — ANESTHESIA POSTPROCEDURE EVALUATION
Anesthesia Post Evaluation    Patient: Yogesh Shay    Procedure(s) Performed: Procedure(s) (LRB):  PHACOEMULSIFICATION, CATARACT (Left)  INSERTION, IOL PROSTHESIS (Left)    Final Anesthesia Type: MAC      Patient location during evaluation: PACU  Patient participation: Yes- Able to Participate  Level of consciousness: awake and alert  Post-procedure vital signs: reviewed and stable  Pain management: adequate  Airway patency: patent    PONV status at discharge: No PONV  Anesthetic complications: no      Cardiovascular status: stable  Respiratory status: unassisted, room air and spontaneous ventilation  Hydration status: euvolemic  Follow-up not needed.          Vitals Value Taken Time   /91 12/16/20 1147   Temp 36.5 °C (97.7 °F) 12/16/20 1200   Pulse 81 12/16/20 1200   Resp 26 12/16/20 1200   SpO2 97 % 12/16/20 1200         No case tracking events are documented in the log.      Pain/Marian Score: Marian Score: 10 (12/16/2020 12:00 PM)

## 2020-12-21 ENCOUNTER — NURSE TRIAGE (OUTPATIENT)
Dept: ADMINISTRATIVE | Facility: CLINIC | Age: 85
End: 2020-12-21

## 2020-12-21 NOTE — TELEPHONE ENCOUNTER
Pt called by post procedural symptom tracker and pt denies any fever cough are SOB will call back if any problems        Reason for Disposition   Information only question and nurse able to answer    Protocols used: INFORMATION ONLY CALL - NO TRIAGE-A-OH

## 2020-12-22 ENCOUNTER — PROCEDURE VISIT (OUTPATIENT)
Dept: OPHTHALMOLOGY | Facility: CLINIC | Age: 85
End: 2020-12-22
Payer: MEDICARE

## 2020-12-22 DIAGNOSIS — H34.8110 HEMISPHERIC RETINAL VEIN OCCLUSION WITH MACULAR EDEMA OF RIGHT EYE: Primary | ICD-10-CM

## 2020-12-22 DIAGNOSIS — M54.9 BACK PAIN, UNSPECIFIED BACK LOCATION, UNSPECIFIED BACK PAIN LATERALITY, UNSPECIFIED CHRONICITY: ICD-10-CM

## 2020-12-22 DIAGNOSIS — H25.13 NS (NUCLEAR SCLEROSIS), BILATERAL: ICD-10-CM

## 2020-12-22 DIAGNOSIS — H35.033 HYPERTENSIVE RETINOPATHY, BILATERAL: ICD-10-CM

## 2020-12-22 PROCEDURE — 67028 INJECTION EYE DRUG: CPT | Mod: S$PBB,79,RT, | Performed by: OPHTHALMOLOGY

## 2020-12-22 PROCEDURE — 99499 UNLISTED E&M SERVICE: CPT | Mod: S$PBB,,, | Performed by: OPHTHALMOLOGY

## 2020-12-22 PROCEDURE — 67028 PR INJECT INTRAVITREAL PHARMCOLOGIC: ICD-10-PCS | Mod: S$PBB,79,RT, | Performed by: OPHTHALMOLOGY

## 2020-12-22 PROCEDURE — 99499 NO LOS: ICD-10-PCS | Mod: S$PBB,,, | Performed by: OPHTHALMOLOGY

## 2020-12-22 PROCEDURE — 67028 INJECTION EYE DRUG: CPT | Mod: PBBFAC,RT | Performed by: OPHTHALMOLOGY

## 2020-12-22 RX ORDER — HYDROCODONE BITARTRATE AND ACETAMINOPHEN 5; 325 MG/1; MG/1
1 TABLET ORAL EVERY 6 HOURS PRN
Qty: 60 TABLET | Refills: 0 | Status: SHIPPED | OUTPATIENT
Start: 2020-12-22 | End: 2021-01-20 | Stop reason: SDUPTHER

## 2020-12-22 RX ADMIN — BEVACIZUMAB 1.25 MG: 100 INJECTION, SOLUTION INTRAVENOUS at 01:12

## 2020-12-22 NOTE — PROGRESS NOTES
HPI     Eye Problem      Additional comments: Hemispheric Retinal Vein Occlusion OD              Comments     1 month Avastin Injection only  DLS: 11/17/2020 Dr. Gould    Patient states he has been doing well since his last visit. He states he   had cataract sx in the left eye last week and his vision is still as   little blurry.     Eye Meds:  PF QID OS  Moxifloxacin QID OS   Latanoprost QHS OD        Prior OCT - OD -increase in ME  OS - no ME      A/P    1. Superior HRVO OD  increase CME today  S/p Avastin OD x 1    Avastin OD today    2. HTN Ret OU  BP control    3. NS OD  Ok for CE  PCIOL OS    4. MMG OU  Mgmt per Dr. Romero      1 month OCT no dilate    Risks, benefits, and alternatives to treatment discussed in detail with the patient.  The patient voiced understanding and wished to proceed with the procedure    Injection Procedure Note:  Diagnosis: HRVO with ME OD    Patient Identified and Time Out complete  Pt Prefers to be marked with sticker rather than ink marker ([id number] #5)  Topical Proparacaine and Betadine.  Inject Avastin OD at 6:00 @ 3.5-4mm posterior to limbus  Post Operative Dx: Same  Complications: None  Follow up as above.

## 2020-12-22 NOTE — TELEPHONE ENCOUNTER
----- Message from Lolly Cline sent at 12/22/2020  8:51 AM CST -----  Contact: Lida @ [phone]  Requesting an RX refill or new RX.  Is this a refill or new RX:   RX name and strength: HYDROcodone-acetaminophen (NORCO) 5-325 mg per tablet  Is this a 30 day or 90 day RX:   Pharmacy name and phone # [id number] - [place] - [address]  Comments:

## 2020-12-23 ENCOUNTER — OFFICE VISIT (OUTPATIENT)
Dept: OPHTHALMOLOGY | Facility: CLINIC | Age: 85
End: 2020-12-23
Payer: MEDICARE

## 2020-12-23 DIAGNOSIS — Z48.810 POSTOPERATIVE CARE FOR CATARACT: Primary | ICD-10-CM

## 2020-12-23 DIAGNOSIS — Z96.1 PSEUDOPHAKIA, LEFT EYE: ICD-10-CM

## 2020-12-23 DIAGNOSIS — Z98.49 POSTOPERATIVE CARE FOR CATARACT: Primary | ICD-10-CM

## 2020-12-23 DIAGNOSIS — H40.042 STEROID RESPONDER, LEFT EYE: ICD-10-CM

## 2020-12-23 DIAGNOSIS — H35.033 HYPERTENSIVE RETINOPATHY, BILATERAL: ICD-10-CM

## 2020-12-23 DIAGNOSIS — Z98.890 S/P LASER IRIDOTOMY: ICD-10-CM

## 2020-12-23 DIAGNOSIS — H40.89 GLAUCOMA DUE TO COMBINATION OF MECHANISMS: ICD-10-CM

## 2020-12-23 DIAGNOSIS — H57.03 SMALL PUPILS: ICD-10-CM

## 2020-12-23 DIAGNOSIS — H34.8110 HEMISPHERIC RETINAL VEIN OCCLUSION WITH MACULAR EDEMA OF RIGHT EYE: ICD-10-CM

## 2020-12-23 PROCEDURE — 99024 POSTOP FOLLOW-UP VISIT: CPT | Mod: POP,,, | Performed by: OPHTHALMOLOGY

## 2020-12-23 PROCEDURE — 99999 PR PBB SHADOW E&M-EST. PATIENT-LVL II: ICD-10-PCS | Mod: PBBFAC,,, | Performed by: OPHTHALMOLOGY

## 2020-12-23 PROCEDURE — 99024 PR POST-OP FOLLOW-UP VISIT: ICD-10-PCS | Mod: POP,,, | Performed by: OPHTHALMOLOGY

## 2020-12-23 PROCEDURE — 99212 OFFICE O/P EST SF 10 MIN: CPT | Mod: PBBFAC | Performed by: OPHTHALMOLOGY

## 2020-12-23 PROCEDURE — 99999 PR PBB SHADOW E&M-EST. PATIENT-LVL II: CPT | Mod: PBBFAC,,, | Performed by: OPHTHALMOLOGY

## 2020-12-23 RX ORDER — BRIMONIDINE TARTRATE AND TIMOLOL MALEATE 2; 5 MG/ML; MG/ML
1 SOLUTION OPHTHALMIC 3 TIMES DAILY
Qty: 5 ML | Refills: 0
Start: 2020-12-23 | End: 2021-01-11 | Stop reason: SDUPTHER

## 2020-12-23 RX ORDER — MOXIFLOXACIN 5 MG/ML
1 SOLUTION/ DROPS OPHTHALMIC 4 TIMES DAILY
COMMUNITY
Start: 2020-12-17 | End: 2020-12-23

## 2020-12-23 RX ORDER — KETOROLAC TROMETHAMINE 5 MG/ML
1 SOLUTION OPHTHALMIC 3 TIMES DAILY
Qty: 5 ML | Refills: 0
Start: 2020-12-23 | End: 2021-01-11 | Stop reason: ALTCHOICE

## 2020-12-23 RX ORDER — DORZOLAMIDE HCL 20 MG/ML
1 SOLUTION/ DROPS OPHTHALMIC 3 TIMES DAILY
Qty: 10 ML | Refills: 0
Start: 2020-12-23 | End: 2021-01-11 | Stop reason: ALTCHOICE

## 2020-12-23 RX ORDER — PREDNISOLONE ACETATE 10 MG/ML
1 SUSPENSION/ DROPS OPHTHALMIC 4 TIMES DAILY
COMMUNITY
Start: 2020-12-17 | End: 2020-12-23

## 2020-12-23 RX ORDER — FLUOROMETHOLONE 1 MG/ML
1 SUSPENSION/ DROPS OPHTHALMIC 3 TIMES DAILY
Qty: 1 ML | Refills: 0
Start: 2020-12-23 | End: 2021-01-11 | Stop reason: ALTCHOICE

## 2020-12-23 NOTE — PROGRESS NOTES
HPI     Post-op Evaluation      Additional comments: Pt states no complaints this morning              Comments     S/p Complex Phaco IOL OS 12/16/20    MEDS:  PA QID OS  Vigamox QID OS    Latanoprost QHS OD          Last edited by Danika Romero MD on 12/23/2020 11:08 AM. (History)            Assessment /Plan     For exam results, see Encounter Report.    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        Glaucoma meds    Latanoprost ou - started 6/15/2018         H/O adverse rxn to glaucoma drops    none        LASERS    Laser PI's 4/2018 - od // OS 5/2018         GLAUCOMA SURGERIES    none        OTHER EYE SURGERIES    none        CDR    0.7/0.4         Tbase    19-27  od // 22-40 os         Tmax     27/40          Ttarget    ?             HVF    2 test 2018 to  2019 - unreliable  od // unreliable  Os (improved ou)         Gonio    +3 w/ steep approach - post PI's ou         CCT    518 / 528        OCT    2 test 2018 to 2020 - RNFL - Full od // dec G/TS os         HRT    2 test 2018 to 2019 - MR - high std dev. od // high std dev os        Disc photos    6/2018     - Ttoday    ??/22  (on latanoprost - ( down from 26/40))  - Test done today - post op phaco/IOL os - 12/16/2020    2. NS ou    Left > right  (also right eye has a HRVO so ? potential    ON FLOWMAX     3. Diabetes    No BDR seen on exam 6/15/2018     4. H/O  tumor / cancer RLL    S/P removal and re-construction at [facility]    The biopsy was done by amanda - but the surgery was done by some one else     5. Has some abdominal issues    Having surgery next week - 3/15/2018     6. Episopde of blurry vision ou    At [facility] 2018 - resolved    7. Hemiretinal vein occulusion with macular edema, right eye vs sup BRVO w/   No NVI   On ASA 81 mg    Following with Mazzulla       - superior HRVO OD   - has seen Mazzulla sense then, and no CME at that time, to f/u in 3 months  - saw PCP and started on BP medications       PLAN   For MMG - CPM with latanoprost qHS OU    Cataracts - complex cataract extraction with use of malyugan ring   Hx recent HRVO OD, no ME when seen with [place] on 8/11/20  - VERY shallow AC and very dense lens / on flomax - small pupil  - high risk for corneal edema and other complications // both cataracts are similar     Phaco / IOL OS Date: 12/16/2020  POW # 1 - phaco/IOL   Doing well - but increase in IOP - likely a steroid responder    Pred Acetate QID - stop - switch to fml tid   Add ketorolac tid os   Add combigan tid os   Add dorzolamide tid   Change latanoprost back to OU      vigamox QID - stop   Shield at night - 1 more week   Glasses day  No lifting, no bending, no eye rubbing    Recent IV injection OD with Mazzulla - yesterday - 12/22/2020 - for BRVO w/ ME     F/U 1 week, AR/MR ou- Dr Ruiz - IOP check - can cont to taper off the FML   Avoid diamox - H/O sulfa allergy - usually can still use diorzolamide       IOL calc - OS   Reyes  OD ZCBOO 24.00, target -0.55  OS ZCBOO 23.50, target -0.60

## 2020-12-23 NOTE — PATIENT INSTRUCTIONS

## 2020-12-28 ENCOUNTER — OFFICE VISIT (OUTPATIENT)
Dept: OPHTHALMOLOGY | Facility: CLINIC | Age: 85
End: 2020-12-28
Payer: MEDICARE

## 2020-12-28 DIAGNOSIS — H40.89 GLAUCOMA DUE TO COMBINATION OF MECHANISMS: ICD-10-CM

## 2020-12-28 DIAGNOSIS — H40.042 STEROID RESPONDER, LEFT EYE: ICD-10-CM

## 2020-12-28 DIAGNOSIS — H34.8110 HEMISPHERIC RETINAL VEIN OCCLUSION WITH MACULAR EDEMA OF RIGHT EYE: ICD-10-CM

## 2020-12-28 DIAGNOSIS — H25.11 NUCLEAR SCLEROTIC CATARACT OF RIGHT EYE: ICD-10-CM

## 2020-12-28 DIAGNOSIS — Z96.1 PSEUDOPHAKIA, LEFT EYE: ICD-10-CM

## 2020-12-28 DIAGNOSIS — Z98.890 S/P LASER IRIDOTOMY: ICD-10-CM

## 2020-12-28 DIAGNOSIS — Z48.810 POSTOPERATIVE CARE FOR CATARACT: Primary | ICD-10-CM

## 2020-12-28 DIAGNOSIS — H40.053 OCULAR HYPERTENSION, BILATERAL: ICD-10-CM

## 2020-12-28 DIAGNOSIS — Z98.49 POSTOPERATIVE CARE FOR CATARACT: Primary | ICD-10-CM

## 2020-12-28 PROCEDURE — 99024 POSTOP FOLLOW-UP VISIT: CPT | Mod: POP,,, | Performed by: STUDENT IN AN ORGANIZED HEALTH CARE EDUCATION/TRAINING PROGRAM

## 2020-12-28 PROCEDURE — 99999 PR PBB SHADOW E&M-EST. PATIENT-LVL I: CPT | Mod: PBBFAC,,, | Performed by: STUDENT IN AN ORGANIZED HEALTH CARE EDUCATION/TRAINING PROGRAM

## 2020-12-28 PROCEDURE — 99999 PR PBB SHADOW E&M-EST. PATIENT-LVL I: ICD-10-PCS | Mod: PBBFAC,,, | Performed by: STUDENT IN AN ORGANIZED HEALTH CARE EDUCATION/TRAINING PROGRAM

## 2020-12-28 PROCEDURE — 99024 PR POST-OP FOLLOW-UP VISIT: ICD-10-PCS | Mod: POP,,, | Performed by: STUDENT IN AN ORGANIZED HEALTH CARE EDUCATION/TRAINING PROGRAM

## 2020-12-28 PROCEDURE — 99211 OFF/OP EST MAY X REQ PHY/QHP: CPT | Mod: PBBFAC | Performed by: STUDENT IN AN ORGANIZED HEALTH CARE EDUCATION/TRAINING PROGRAM

## 2020-12-28 NOTE — PROGRESS NOTES
HPI     S/P Complex Cataract extraction with trypan blue and  Malyugin ring   ,phacoemulsification, and posterior chamber intraocular lens placement.   12/16/2020    Today, patient reports no new changes to the eyes or vision. Patient   reports compliance with eye drops. Reports no new changes to health.       FML TID OS   Ketorolac TID OS   Combigan TID OS   Dorzolamide TID OS   Latanoprost Q HS OU    Last edited by Nicki Ruiz MD on 12/28/2020 10:44 AM. (History)        Assessment /Plan     For exam results, see Encounter Report.    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        Glaucoma meds    Latanoprost ou - started 6/15/2018         H/O adverse rxn to glaucoma drops    none        LASERS    Laser PI's 4/2018 - od // OS 5/2018         GLAUCOMA SURGERIES    none        OTHER EYE SURGERIES    none        CDR    0.7/0.4         Tbase    19-27  od // 22-40 os         Tmax     27/40          Ttarget    ?             HVF    2 test 2018 to  2019 - unreliable  od // unreliable  Os (improved ou)         Gonio    +3 w/ steep approach - post PI's ou         CCT    518 / 528        OCT    2 test 2018 to 2020 - RNFL - Full od // dec G/TS os         HRT    2 test 2018 to 2019 - MR - high std dev. od // high std dev os        Disc photos    6/2018     - Ttoday    15/14   ( down from 26/40))  - Test done today  - post op phaco/IOL os - 12/16/2020    2. NS ou    Left > right  (also right eye has a HRVO so ? potential    ON FLOWMAX     3. Diabetes    No BDR seen on exam 6/15/2018     4. H/O tumor / cancer RLL    S/P removal and re-construction at [facility]    The biopsy was done by amanda - but the surgery was done by some one else     5. Has some abdominal issues    Having surgery next week - 3/15/2018     6. Episopde of blurry vision ou    At [facility] 2018 - resolved    7. Hemiretinal vein occulusion with macular edema, right eye vs sup BRVO w/   No NVI   On ASA 81 mg    Following with Mazzulla       - superior HRVO OD   - has seen Luis Fernando sense then, and no CME at that time, to f/u in 3 months  - saw PCP and started on BP medications       PLAN   For MMG - CPM with latanoprost qHS OU    Cataracts - complex cataract extraction with use of malyugan ring   Hx recent HRVO OD, no ME when seen with Luis Fernando on 8/11/20  - VERY shallow AC and very dense lens / on flomax - small pupil  - high risk for corneal edema and other complications // both cataracts are similar     Phaco / IOL OS Date: 12/16/2020  POW # 2 - phaco/IOL   Doing well - IOP better on ketorolac (did have increase with PF likely a steroid responder)   Pred Acetate QID - stop - switch to fml tid --> taper to  BID  Add ketorolac tid os --> continue   Add combigan tid os --> continue until off steroids  Add dorzolamide tid --> continue until off steroids  Change latanoprost back to OU   Will try to take off a few drops after FML is off     vigamox QID - stop   Shield at night - stop  Glasses day  No lifting, no bending, no eye rubbing --> ok to return to normal activities     Recent IV injection OD with Mazzulla - yesterday - 12/22/2020 - for BRVO w/ ME     F/U 2 weeks, AR/MR ou with IOP check - can cont to taper off the FML --> taper to BID x 1 week --> then daily x 1 week   Avoid diamox - H/O sulfa allergy - usually can still use diorzolamide       IOL calc - OS    Eric  OD ZCBOO 24.00, target -0.55  OS ZCBOO 23.50, target -0.60

## 2021-01-10 ENCOUNTER — IMMUNIZATION (OUTPATIENT)
Dept: OBSTETRICS AND GYNECOLOGY | Facility: CLINIC | Age: 86
End: 2021-01-10
Payer: MEDICARE

## 2021-01-10 DIAGNOSIS — Z23 NEED FOR VACCINATION: ICD-10-CM

## 2021-01-10 PROCEDURE — 91300 COVID-19, MRNA, LNP-S, PF, 30 MCG/0.3 ML DOSE VACCINE: CPT | Mod: PBBFAC

## 2021-01-11 ENCOUNTER — OFFICE VISIT (OUTPATIENT)
Dept: OPHTHALMOLOGY | Facility: CLINIC | Age: 86
End: 2021-01-11
Payer: MEDICARE

## 2021-01-11 DIAGNOSIS — H40.042 STEROID RESPONDER, LEFT EYE: ICD-10-CM

## 2021-01-11 DIAGNOSIS — H40.053 OCULAR HYPERTENSION, BILATERAL: ICD-10-CM

## 2021-01-11 DIAGNOSIS — H34.8110 HEMISPHERIC RETINAL VEIN OCCLUSION WITH MACULAR EDEMA OF RIGHT EYE: ICD-10-CM

## 2021-01-11 DIAGNOSIS — H40.89 GLAUCOMA DUE TO COMBINATION OF MECHANISMS: ICD-10-CM

## 2021-01-11 DIAGNOSIS — Z96.1 PSEUDOPHAKIA, LEFT EYE: ICD-10-CM

## 2021-01-11 DIAGNOSIS — H25.11 NUCLEAR SCLEROTIC CATARACT OF RIGHT EYE: ICD-10-CM

## 2021-01-11 DIAGNOSIS — Z98.49 POSTOPERATIVE CARE FOR CATARACT: Primary | ICD-10-CM

## 2021-01-11 DIAGNOSIS — H25.13 NS (NUCLEAR SCLEROSIS), BILATERAL: ICD-10-CM

## 2021-01-11 DIAGNOSIS — Z48.810 POSTOPERATIVE CARE FOR CATARACT: Primary | ICD-10-CM

## 2021-01-11 PROCEDURE — 92134 POSTERIOR SEGMENT OCT RETINA (OCULAR COHERENCE TOMOGRAPHY)-BOTH EYES: ICD-10-PCS | Mod: 26,S$PBB,, | Performed by: OPHTHALMOLOGY

## 2021-01-11 PROCEDURE — 99024 PR POST-OP FOLLOW-UP VISIT: ICD-10-PCS | Mod: POP,,, | Performed by: OPHTHALMOLOGY

## 2021-01-11 PROCEDURE — 99024 POSTOP FOLLOW-UP VISIT: CPT | Mod: POP,,, | Performed by: OPHTHALMOLOGY

## 2021-01-11 PROCEDURE — 99999 PR PBB SHADOW E&M-EST. PATIENT-LVL III: CPT | Mod: PBBFAC,,, | Performed by: OPHTHALMOLOGY

## 2021-01-11 PROCEDURE — 92134 CPTRZ OPH DX IMG PST SGM RTA: CPT | Mod: PBBFAC | Performed by: OPHTHALMOLOGY

## 2021-01-11 PROCEDURE — 99999 PR PBB SHADOW E&M-EST. PATIENT-LVL III: ICD-10-PCS | Mod: PBBFAC,,, | Performed by: OPHTHALMOLOGY

## 2021-01-11 PROCEDURE — 99213 OFFICE O/P EST LOW 20 MIN: CPT | Mod: PBBFAC | Performed by: OPHTHALMOLOGY

## 2021-01-11 RX ORDER — BRIMONIDINE TARTRATE AND TIMOLOL MALEATE 2; 5 MG/ML; MG/ML
1 SOLUTION OPHTHALMIC 2 TIMES DAILY
Qty: 5 ML | Refills: 3 | Status: SHIPPED | OUTPATIENT
Start: 2021-01-11 | End: 2021-02-25 | Stop reason: SDUPTHER

## 2021-01-20 DIAGNOSIS — M54.9 BACK PAIN, UNSPECIFIED BACK LOCATION, UNSPECIFIED BACK PAIN LATERALITY, UNSPECIFIED CHRONICITY: ICD-10-CM

## 2021-01-20 RX ORDER — HYDROCODONE BITARTRATE AND ACETAMINOPHEN 5; 325 MG/1; MG/1
1 TABLET ORAL EVERY 6 HOURS PRN
Qty: 60 TABLET | Refills: 0 | Status: SHIPPED | OUTPATIENT
Start: 2021-01-20 | End: 2021-02-24 | Stop reason: SDUPTHER

## 2021-01-25 ENCOUNTER — OFFICE VISIT (OUTPATIENT)
Dept: OPHTHALMOLOGY | Facility: CLINIC | Age: 86
End: 2021-01-25
Payer: MEDICARE

## 2021-01-25 DIAGNOSIS — Z98.49 POSTOPERATIVE CARE FOR CATARACT: Primary | ICD-10-CM

## 2021-01-25 DIAGNOSIS — H40.89 GLAUCOMA DUE TO COMBINATION OF MECHANISMS: ICD-10-CM

## 2021-01-25 DIAGNOSIS — Z96.1 PSEUDOPHAKIA, LEFT EYE: ICD-10-CM

## 2021-01-25 DIAGNOSIS — Z48.810 POSTOPERATIVE CARE FOR CATARACT: Primary | ICD-10-CM

## 2021-01-25 DIAGNOSIS — H25.11 NUCLEAR SCLEROTIC CATARACT OF RIGHT EYE: ICD-10-CM

## 2021-01-25 DIAGNOSIS — H40.042 STEROID RESPONDER, LEFT EYE: ICD-10-CM

## 2021-01-25 DIAGNOSIS — H34.8110 HEMISPHERIC RETINAL VEIN OCCLUSION WITH MACULAR EDEMA OF RIGHT EYE: ICD-10-CM

## 2021-01-25 PROCEDURE — 99999 PR PBB SHADOW E&M-EST. PATIENT-LVL III: ICD-10-PCS | Mod: PBBFAC,,, | Performed by: OPHTHALMOLOGY

## 2021-01-25 PROCEDURE — 99999 PR PBB SHADOW E&M-EST. PATIENT-LVL III: CPT | Mod: PBBFAC,,, | Performed by: OPHTHALMOLOGY

## 2021-01-25 PROCEDURE — 99024 PR POST-OP FOLLOW-UP VISIT: ICD-10-PCS | Mod: POP,,, | Performed by: OPHTHALMOLOGY

## 2021-01-25 PROCEDURE — 99213 OFFICE O/P EST LOW 20 MIN: CPT | Mod: PBBFAC | Performed by: OPHTHALMOLOGY

## 2021-01-25 PROCEDURE — 99024 POSTOP FOLLOW-UP VISIT: CPT | Mod: POP,,, | Performed by: OPHTHALMOLOGY

## 2021-01-26 ENCOUNTER — PROCEDURE VISIT (OUTPATIENT)
Dept: OPHTHALMOLOGY | Facility: CLINIC | Age: 86
End: 2021-01-26
Payer: MEDICARE

## 2021-01-26 DIAGNOSIS — H35.033 HYPERTENSIVE RETINOPATHY, BILATERAL: ICD-10-CM

## 2021-01-26 DIAGNOSIS — H34.8110 HEMISPHERIC RETINAL VEIN OCCLUSION WITH MACULAR EDEMA OF RIGHT EYE: Primary | ICD-10-CM

## 2021-01-26 DIAGNOSIS — H25.13 NS (NUCLEAR SCLEROSIS), BILATERAL: ICD-10-CM

## 2021-01-26 PROCEDURE — 92012 PR EYE EXAM, EST PATIENT,INTERMED: ICD-10-PCS | Mod: 25,S$PBB,, | Performed by: OPHTHALMOLOGY

## 2021-01-26 PROCEDURE — 67028 INJECTION EYE DRUG: CPT | Mod: S$PBB,79,RT, | Performed by: OPHTHALMOLOGY

## 2021-01-26 PROCEDURE — 67028 INJECTION EYE DRUG: CPT | Mod: PBBFAC,RT | Performed by: OPHTHALMOLOGY

## 2021-01-26 PROCEDURE — 67028 PR INJECT INTRAVITREAL PHARMCOLOGIC: ICD-10-PCS | Mod: S$PBB,79,RT, | Performed by: OPHTHALMOLOGY

## 2021-01-26 PROCEDURE — 92012 INTRM OPH EXAM EST PATIENT: CPT | Mod: 25,S$PBB,, | Performed by: OPHTHALMOLOGY

## 2021-01-26 RX ADMIN — BEVACIZUMAB 1.25 MG: 100 INJECTION, SOLUTION INTRAVENOUS at 12:01

## 2021-01-31 ENCOUNTER — IMMUNIZATION (OUTPATIENT)
Dept: OBSTETRICS AND GYNECOLOGY | Facility: CLINIC | Age: 86
End: 2021-01-31
Payer: MEDICARE

## 2021-01-31 DIAGNOSIS — Z23 NEED FOR VACCINATION: Primary | ICD-10-CM

## 2021-01-31 PROCEDURE — 91300 COVID-19, MRNA, LNP-S, PF, 30 MCG/0.3 ML DOSE VACCINE: CPT | Mod: PBBFAC

## 2021-01-31 PROCEDURE — 0002A COVID-19, MRNA, LNP-S, PF, 30 MCG/0.3 ML DOSE VACCINE: CPT | Mod: PBBFAC

## 2021-02-24 DIAGNOSIS — M54.9 BACK PAIN, UNSPECIFIED BACK LOCATION, UNSPECIFIED BACK PAIN LATERALITY, UNSPECIFIED CHRONICITY: ICD-10-CM

## 2021-02-24 RX ORDER — HYDROCODONE BITARTRATE AND ACETAMINOPHEN 5; 325 MG/1; MG/1
1 TABLET ORAL EVERY 6 HOURS PRN
Qty: 60 TABLET | Refills: 0 | Status: SHIPPED | OUTPATIENT
Start: 2021-02-24 | End: 2021-03-22 | Stop reason: SDUPTHER

## 2021-02-25 ENCOUNTER — OFFICE VISIT (OUTPATIENT)
Dept: OPHTHALMOLOGY | Facility: CLINIC | Age: 86
End: 2021-02-25
Payer: MEDICARE

## 2021-02-25 DIAGNOSIS — H25.11 NUCLEAR SCLEROTIC CATARACT OF RIGHT EYE: ICD-10-CM

## 2021-02-25 DIAGNOSIS — Z48.810 POSTOPERATIVE CARE FOR CATARACT: Primary | ICD-10-CM

## 2021-02-25 DIAGNOSIS — Z98.890 S/P LASER IRIDOTOMY: ICD-10-CM

## 2021-02-25 DIAGNOSIS — H40.042 STEROID RESPONDER, LEFT EYE: ICD-10-CM

## 2021-02-25 DIAGNOSIS — Z98.49 POSTOPERATIVE CARE FOR CATARACT: Primary | ICD-10-CM

## 2021-02-25 DIAGNOSIS — H34.8110 HEMISPHERIC RETINAL VEIN OCCLUSION WITH MACULAR EDEMA OF RIGHT EYE: ICD-10-CM

## 2021-02-25 DIAGNOSIS — H35.033 HYPERTENSIVE RETINOPATHY, BILATERAL: ICD-10-CM

## 2021-02-25 DIAGNOSIS — H40.89 GLAUCOMA DUE TO COMBINATION OF MECHANISMS: ICD-10-CM

## 2021-02-25 DIAGNOSIS — H57.03 SMALL PUPILS: ICD-10-CM

## 2021-02-25 DIAGNOSIS — Z96.1 PSEUDOPHAKIA, LEFT EYE: ICD-10-CM

## 2021-02-25 PROCEDURE — 99213 OFFICE O/P EST LOW 20 MIN: CPT | Mod: PBBFAC,25 | Performed by: OPHTHALMOLOGY

## 2021-02-25 PROCEDURE — 99999 PR PBB SHADOW E&M-EST. PATIENT-LVL III: ICD-10-PCS | Mod: PBBFAC,,, | Performed by: OPHTHALMOLOGY

## 2021-02-25 PROCEDURE — 99024 POSTOP FOLLOW-UP VISIT: CPT | Mod: POP,,, | Performed by: OPHTHALMOLOGY

## 2021-02-25 PROCEDURE — 99999 PR PBB SHADOW E&M-EST. PATIENT-LVL III: CPT | Mod: PBBFAC,,, | Performed by: OPHTHALMOLOGY

## 2021-02-25 PROCEDURE — 92134 CPTRZ OPH DX IMG PST SGM RTA: CPT | Mod: PBBFAC | Performed by: OPHTHALMOLOGY

## 2021-02-25 PROCEDURE — 92134 POSTERIOR SEGMENT OCT RETINA (OCULAR COHERENCE TOMOGRAPHY)-BOTH EYES: ICD-10-PCS | Mod: 26,S$PBB,, | Performed by: OPHTHALMOLOGY

## 2021-02-25 PROCEDURE — 99024 PR POST-OP FOLLOW-UP VISIT: ICD-10-PCS | Mod: POP,,, | Performed by: OPHTHALMOLOGY

## 2021-02-25 RX ORDER — BRIMONIDINE TARTRATE AND TIMOLOL MALEATE 2; 5 MG/ML; MG/ML
1 SOLUTION OPHTHALMIC 2 TIMES DAILY
Qty: 5 ML | Refills: 6 | Status: SHIPPED | OUTPATIENT
Start: 2021-02-25 | End: 2021-08-27 | Stop reason: SDUPTHER

## 2021-03-15 RX ORDER — METFORMIN HYDROCHLORIDE 500 MG/1
TABLET ORAL
Qty: 180 TABLET | Refills: 3 | Status: SHIPPED | OUTPATIENT
Start: 2021-03-15 | End: 2022-02-08

## 2021-03-16 ENCOUNTER — PROCEDURE VISIT (OUTPATIENT)
Dept: OPHTHALMOLOGY | Facility: CLINIC | Age: 86
End: 2021-03-16
Payer: MEDICARE

## 2021-03-16 DIAGNOSIS — H35.033 HYPERTENSIVE RETINOPATHY, BILATERAL: ICD-10-CM

## 2021-03-16 DIAGNOSIS — H34.8110 HEMISPHERIC RETINAL VEIN OCCLUSION WITH MACULAR EDEMA OF RIGHT EYE: Primary | ICD-10-CM

## 2021-03-16 PROCEDURE — 92012 PR EYE EXAM, EST PATIENT,INTERMED: ICD-10-PCS | Mod: 25,S$PBB,, | Performed by: OPHTHALMOLOGY

## 2021-03-16 PROCEDURE — 92012 INTRM OPH EXAM EST PATIENT: CPT | Mod: 25,S$PBB,, | Performed by: OPHTHALMOLOGY

## 2021-03-16 PROCEDURE — 67028 INJECTION EYE DRUG: CPT | Mod: PBBFAC,RT | Performed by: OPHTHALMOLOGY

## 2021-03-16 PROCEDURE — 92134 POSTERIOR SEGMENT OCT RETINA (OCULAR COHERENCE TOMOGRAPHY)-BOTH EYES: ICD-10-PCS | Mod: 26,S$PBB,, | Performed by: OPHTHALMOLOGY

## 2021-03-16 PROCEDURE — 92134 CPTRZ OPH DX IMG PST SGM RTA: CPT | Mod: PBBFAC | Performed by: OPHTHALMOLOGY

## 2021-03-16 PROCEDURE — 67028 PR INJECT INTRAVITREAL PHARMCOLOGIC: ICD-10-PCS | Mod: S$PBB,79,RT, | Performed by: OPHTHALMOLOGY

## 2021-03-16 PROCEDURE — 67028 INJECTION EYE DRUG: CPT | Mod: S$PBB,79,RT, | Performed by: OPHTHALMOLOGY

## 2021-03-16 RX ORDER — FLUORESCEIN 500 MG/ML
5 INJECTION INTRAVENOUS ONCE
Status: COMPLETED | OUTPATIENT
Start: 2021-03-16 | End: 2021-04-20

## 2021-03-16 RX ADMIN — BEVACIZUMAB 1.25 MG: 100 INJECTION, SOLUTION INTRAVENOUS at 10:03

## 2021-03-22 DIAGNOSIS — M54.9 BACK PAIN, UNSPECIFIED BACK LOCATION, UNSPECIFIED BACK PAIN LATERALITY, UNSPECIFIED CHRONICITY: ICD-10-CM

## 2021-03-22 RX ORDER — HYDROCODONE BITARTRATE AND ACETAMINOPHEN 5; 325 MG/1; MG/1
1 TABLET ORAL EVERY 6 HOURS PRN
Qty: 60 TABLET | Refills: 0 | Status: SHIPPED | OUTPATIENT
Start: 2021-03-22 | End: 2021-04-22 | Stop reason: SDUPTHER

## 2021-04-07 ENCOUNTER — PATIENT OUTREACH (OUTPATIENT)
Dept: ADMINISTRATIVE | Facility: OTHER | Age: 86
End: 2021-04-07

## 2021-04-20 ENCOUNTER — OFFICE VISIT (OUTPATIENT)
Dept: OPHTHALMOLOGY | Facility: CLINIC | Age: 86
End: 2021-04-20
Payer: MEDICARE

## 2021-04-20 DIAGNOSIS — H34.8110 HEMISPHERIC RETINAL VEIN OCCLUSION WITH MACULAR EDEMA OF RIGHT EYE: Primary | ICD-10-CM

## 2021-04-20 PROCEDURE — 99499 UNLISTED E&M SERVICE: CPT | Mod: S$PBB,,, | Performed by: OPHTHALMOLOGY

## 2021-04-20 PROCEDURE — 99999 PR PBB SHADOW E&M-EST. PATIENT-LVL III: CPT | Mod: PBBFAC,,, | Performed by: OPHTHALMOLOGY

## 2021-04-20 PROCEDURE — 67210 TREATMENT OF RETINAL LESION: CPT | Mod: PBBFAC,RT | Performed by: OPHTHALMOLOGY

## 2021-04-20 PROCEDURE — 99999 PR PBB SHADOW E&M-EST. PATIENT-LVL III: ICD-10-PCS | Mod: PBBFAC,,, | Performed by: OPHTHALMOLOGY

## 2021-04-20 PROCEDURE — 99499 NO LOS: ICD-10-PCS | Mod: S$PBB,,, | Performed by: OPHTHALMOLOGY

## 2021-04-20 PROCEDURE — 67210 TREATMENT OF RETINAL LESION: CPT | Mod: S$PBB,RT,, | Performed by: OPHTHALMOLOGY

## 2021-04-20 PROCEDURE — 99213 OFFICE O/P EST LOW 20 MIN: CPT | Mod: PBBFAC,25 | Performed by: OPHTHALMOLOGY

## 2021-04-20 PROCEDURE — 67210 PR DESTRUC RETINAL LESN,PHOTOCOAG: ICD-10-PCS | Mod: S$PBB,RT,, | Performed by: OPHTHALMOLOGY

## 2021-04-20 RX ADMIN — FLUORESCEIN 500 MG: 500 INJECTION INTRAVENOUS at 03:04

## 2021-04-22 DIAGNOSIS — M54.9 BACK PAIN, UNSPECIFIED BACK LOCATION, UNSPECIFIED BACK PAIN LATERALITY, UNSPECIFIED CHRONICITY: ICD-10-CM

## 2021-04-22 RX ORDER — HYDROCODONE BITARTRATE AND ACETAMINOPHEN 5; 325 MG/1; MG/1
1 TABLET ORAL EVERY 6 HOURS PRN
Qty: 60 TABLET | Refills: 0 | Status: SHIPPED | OUTPATIENT
Start: 2021-04-22 | End: 2021-05-21 | Stop reason: SDUPTHER

## 2021-04-22 RX ORDER — PRAVASTATIN SODIUM 10 MG/1
TABLET ORAL
Qty: 90 TABLET | Refills: 1 | Status: SHIPPED | OUTPATIENT
Start: 2021-04-22 | End: 2021-10-19

## 2021-04-22 RX ORDER — HYDROCHLOROTHIAZIDE 25 MG/1
TABLET ORAL
Qty: 90 TABLET | Refills: 1 | Status: SHIPPED | OUTPATIENT
Start: 2021-04-22 | End: 2021-10-19

## 2021-04-26 ENCOUNTER — LAB VISIT (OUTPATIENT)
Dept: LAB | Facility: HOSPITAL | Age: 86
End: 2021-04-26
Attending: INTERNAL MEDICINE
Payer: MEDICARE

## 2021-04-26 DIAGNOSIS — E11.42 DIABETIC POLYNEUROPATHY ASSOCIATED WITH TYPE 2 DIABETES MELLITUS: ICD-10-CM

## 2021-04-26 DIAGNOSIS — I15.2 HYPERTENSION ASSOCIATED WITH DIABETES: ICD-10-CM

## 2021-04-26 DIAGNOSIS — E11.59 HYPERTENSION ASSOCIATED WITH DIABETES: ICD-10-CM

## 2021-04-26 LAB
ALBUMIN SERPL BCP-MCNC: 3.7 G/DL (ref 3.5–5.2)
ALP SERPL-CCNC: 71 U/L (ref 55–135)
ALT SERPL W/O P-5'-P-CCNC: 12 U/L (ref 10–44)
ANION GAP SERPL CALC-SCNC: 9 MMOL/L (ref 8–16)
AST SERPL-CCNC: 15 U/L (ref 10–40)
BASOPHILS # BLD AUTO: 0.05 K/UL (ref 0–0.2)
BASOPHILS NFR BLD: 0.8 % (ref 0–1.9)
BILIRUB SERPL-MCNC: 0.6 MG/DL (ref 0.1–1)
BUN SERPL-MCNC: 15 MG/DL (ref 8–23)
CALCIUM SERPL-MCNC: 9.2 MG/DL (ref 8.7–10.5)
CHLORIDE SERPL-SCNC: 97 MMOL/L (ref 95–110)
CHOLEST SERPL-MCNC: 130 MG/DL (ref 120–199)
CHOLEST/HDLC SERPL: 2.4 {RATIO} (ref 2–5)
CO2 SERPL-SCNC: 26 MMOL/L (ref 23–29)
CREAT SERPL-MCNC: 0.9 MG/DL (ref 0.5–1.4)
DIFFERENTIAL METHOD: ABNORMAL
EOSINOPHIL # BLD AUTO: 0.2 K/UL (ref 0–0.5)
EOSINOPHIL NFR BLD: 2.7 % (ref 0–8)
ERYTHROCYTE [DISTWIDTH] IN BLOOD BY AUTOMATED COUNT: 13.1 % (ref 11.5–14.5)
EST. GFR  (AFRICAN AMERICAN): >60 ML/MIN/1.73 M^2
EST. GFR  (NON AFRICAN AMERICAN): >60 ML/MIN/1.73 M^2
ESTIMATED AVG GLUCOSE: 114 MG/DL (ref 68–131)
GLUCOSE SERPL-MCNC: 103 MG/DL (ref 70–110)
HBA1C MFR BLD: 5.6 % (ref 4–5.6)
HCT VFR BLD AUTO: 35.9 % (ref 40–54)
HDLC SERPL-MCNC: 54 MG/DL (ref 40–75)
HDLC SERPL: 41.5 % (ref 20–50)
HGB BLD-MCNC: 11.8 G/DL (ref 14–18)
IMM GRANULOCYTES # BLD AUTO: 0.03 K/UL (ref 0–0.04)
IMM GRANULOCYTES NFR BLD AUTO: 0.5 % (ref 0–0.5)
LDLC SERPL CALC-MCNC: 60.2 MG/DL (ref 63–159)
LYMPHOCYTES # BLD AUTO: 2.2 K/UL (ref 1–4.8)
LYMPHOCYTES NFR BLD: 33.8 % (ref 18–48)
MCH RBC QN AUTO: 31.2 PG (ref 27–31)
MCHC RBC AUTO-ENTMCNC: 32.9 G/DL (ref 32–36)
MCV RBC AUTO: 95 FL (ref 82–98)
MONOCYTES # BLD AUTO: 0.5 K/UL (ref 0.3–1)
MONOCYTES NFR BLD: 7.1 % (ref 4–15)
NEUTROPHILS # BLD AUTO: 3.5 K/UL (ref 1.8–7.7)
NEUTROPHILS NFR BLD: 55.1 % (ref 38–73)
NONHDLC SERPL-MCNC: 76 MG/DL
NRBC BLD-RTO: 0 /100 WBC
PLATELET # BLD AUTO: 237 K/UL (ref 150–450)
PMV BLD AUTO: 9.5 FL (ref 9.2–12.9)
POTASSIUM SERPL-SCNC: 4.6 MMOL/L (ref 3.5–5.1)
PROT SERPL-MCNC: 7 G/DL (ref 6–8.4)
RBC # BLD AUTO: 3.78 M/UL (ref 4.6–6.2)
SODIUM SERPL-SCNC: 132 MMOL/L (ref 136–145)
TRIGL SERPL-MCNC: 79 MG/DL (ref 30–150)
WBC # BLD AUTO: 6.36 K/UL (ref 3.9–12.7)

## 2021-04-26 PROCEDURE — 36415 COLL VENOUS BLD VENIPUNCTURE: CPT | Mod: PO | Performed by: INTERNAL MEDICINE

## 2021-04-26 PROCEDURE — 80053 COMPREHEN METABOLIC PANEL: CPT | Performed by: INTERNAL MEDICINE

## 2021-04-26 PROCEDURE — 83036 HEMOGLOBIN GLYCOSYLATED A1C: CPT | Performed by: INTERNAL MEDICINE

## 2021-04-26 PROCEDURE — 85025 COMPLETE CBC W/AUTO DIFF WBC: CPT | Performed by: INTERNAL MEDICINE

## 2021-04-26 PROCEDURE — 80061 LIPID PANEL: CPT | Performed by: INTERNAL MEDICINE

## 2021-05-21 DIAGNOSIS — M54.9 BACK PAIN, UNSPECIFIED BACK LOCATION, UNSPECIFIED BACK PAIN LATERALITY, UNSPECIFIED CHRONICITY: ICD-10-CM

## 2021-05-24 RX ORDER — HYDROCODONE BITARTRATE AND ACETAMINOPHEN 5; 325 MG/1; MG/1
1 TABLET ORAL EVERY 6 HOURS PRN
Qty: 60 TABLET | Refills: 0 | Status: SHIPPED | OUTPATIENT
Start: 2021-05-24 | End: 2021-06-23 | Stop reason: SDUPTHER

## 2021-05-28 ENCOUNTER — OFFICE VISIT (OUTPATIENT)
Dept: OPHTHALMOLOGY | Facility: CLINIC | Age: 86
End: 2021-05-28
Payer: MEDICARE

## 2021-05-28 DIAGNOSIS — H40.042 STEROID RESPONDER, LEFT EYE: ICD-10-CM

## 2021-05-28 DIAGNOSIS — H35.033 HYPERTENSIVE RETINOPATHY, BILATERAL: ICD-10-CM

## 2021-05-28 DIAGNOSIS — Z98.890 S/P LASER IRIDOTOMY: ICD-10-CM

## 2021-05-28 DIAGNOSIS — H40.89 GLAUCOMA DUE TO COMBINATION OF MECHANISMS: Primary | ICD-10-CM

## 2021-05-28 DIAGNOSIS — Z96.1 PSEUDOPHAKIA, LEFT EYE: ICD-10-CM

## 2021-05-28 DIAGNOSIS — H34.8110 HEMISPHERIC RETINAL VEIN OCCLUSION WITH MACULAR EDEMA OF RIGHT EYE: ICD-10-CM

## 2021-05-28 DIAGNOSIS — H18.899 IRREGULAR SURFACE OF CORNEA: ICD-10-CM

## 2021-05-28 DIAGNOSIS — H25.11 NUCLEAR SCLEROTIC CATARACT OF RIGHT EYE: ICD-10-CM

## 2021-05-28 PROCEDURE — 99999 PR PBB SHADOW E&M-EST. PATIENT-LVL III: ICD-10-PCS | Mod: PBBFAC,,, | Performed by: OPHTHALMOLOGY

## 2021-05-28 PROCEDURE — 99999 PR PBB SHADOW E&M-EST. PATIENT-LVL III: CPT | Mod: PBBFAC,,, | Performed by: OPHTHALMOLOGY

## 2021-05-28 PROCEDURE — 99213 OFFICE O/P EST LOW 20 MIN: CPT | Mod: PBBFAC | Performed by: OPHTHALMOLOGY

## 2021-05-28 PROCEDURE — 92012 PR EYE EXAM, EST PATIENT,INTERMED: ICD-10-PCS | Mod: S$PBB,,, | Performed by: OPHTHALMOLOGY

## 2021-05-28 PROCEDURE — 92012 INTRM OPH EXAM EST PATIENT: CPT | Mod: S$PBB,,, | Performed by: OPHTHALMOLOGY

## 2021-06-04 DIAGNOSIS — I10 ESSENTIAL HYPERTENSION: ICD-10-CM

## 2021-06-08 RX ORDER — LISINOPRIL 20 MG/1
TABLET ORAL
Qty: 90 TABLET | Refills: 1 | Status: SHIPPED | OUTPATIENT
Start: 2021-06-08 | End: 2022-02-08

## 2021-06-21 DIAGNOSIS — G89.29 CHRONIC MIDLINE LOW BACK PAIN WITHOUT SCIATICA: ICD-10-CM

## 2021-06-21 DIAGNOSIS — M54.50 CHRONIC MIDLINE LOW BACK PAIN WITHOUT SCIATICA: ICD-10-CM

## 2021-06-21 RX ORDER — MELOXICAM 7.5 MG/1
TABLET ORAL
Qty: 30 TABLET | Refills: 12 | Status: SHIPPED | OUTPATIENT
Start: 2021-06-21 | End: 2022-06-28

## 2021-06-22 DIAGNOSIS — M54.9 BACK PAIN, UNSPECIFIED BACK LOCATION, UNSPECIFIED BACK PAIN LATERALITY, UNSPECIFIED CHRONICITY: ICD-10-CM

## 2021-06-23 RX ORDER — HYDROCODONE BITARTRATE AND ACETAMINOPHEN 5; 325 MG/1; MG/1
1 TABLET ORAL EVERY 6 HOURS PRN
Qty: 60 TABLET | Refills: 0 | Status: SHIPPED | OUTPATIENT
Start: 2021-06-23 | End: 2021-07-20 | Stop reason: SDUPTHER

## 2021-07-06 ENCOUNTER — TELEPHONE (OUTPATIENT)
Dept: INTERNAL MEDICINE | Facility: CLINIC | Age: 86
End: 2021-07-06

## 2021-07-06 DIAGNOSIS — E11.42 DIABETIC POLYNEUROPATHY ASSOCIATED WITH TYPE 2 DIABETES MELLITUS: ICD-10-CM

## 2021-07-06 DIAGNOSIS — I10 ESSENTIAL HYPERTENSION: Primary | ICD-10-CM

## 2021-07-06 DIAGNOSIS — E78.5 HYPERLIPIDEMIA ASSOCIATED WITH TYPE 2 DIABETES MELLITUS: ICD-10-CM

## 2021-07-06 DIAGNOSIS — E11.69 HYPERLIPIDEMIA ASSOCIATED WITH TYPE 2 DIABETES MELLITUS: ICD-10-CM

## 2021-07-20 ENCOUNTER — OFFICE VISIT (OUTPATIENT)
Dept: OPHTHALMOLOGY | Facility: CLINIC | Age: 86
End: 2021-07-20
Payer: MEDICARE

## 2021-07-20 ENCOUNTER — PATIENT OUTREACH (OUTPATIENT)
Dept: ADMINISTRATIVE | Facility: OTHER | Age: 86
End: 2021-07-20

## 2021-07-20 DIAGNOSIS — H34.8110 HEMISPHERIC RETINAL VEIN OCCLUSION WITH MACULAR EDEMA OF RIGHT EYE: Primary | ICD-10-CM

## 2021-07-20 DIAGNOSIS — H35.033 HYPERTENSIVE RETINOPATHY, BILATERAL: ICD-10-CM

## 2021-07-20 DIAGNOSIS — M54.9 BACK PAIN, UNSPECIFIED BACK LOCATION, UNSPECIFIED BACK PAIN LATERALITY, UNSPECIFIED CHRONICITY: ICD-10-CM

## 2021-07-20 DIAGNOSIS — H40.89 GLAUCOMA DUE TO COMBINATION OF MECHANISMS: ICD-10-CM

## 2021-07-20 PROCEDURE — 92134 CPTRZ OPH DX IMG PST SGM RTA: CPT | Mod: PBBFAC | Performed by: OPHTHALMOLOGY

## 2021-07-20 PROCEDURE — 92134 POSTERIOR SEGMENT OCT RETINA (OCULAR COHERENCE TOMOGRAPHY)-BOTH EYES: ICD-10-PCS | Mod: 26,S$PBB,, | Performed by: OPHTHALMOLOGY

## 2021-07-20 PROCEDURE — 92014 COMPRE OPH EXAM EST PT 1/>: CPT | Mod: S$PBB,,, | Performed by: OPHTHALMOLOGY

## 2021-07-20 PROCEDURE — 92201 OPSCPY EXTND RTA DRAW UNI/BI: CPT | Mod: S$PBB,,, | Performed by: OPHTHALMOLOGY

## 2021-07-20 PROCEDURE — 99999 PR PBB SHADOW E&M-EST. PATIENT-LVL III: CPT | Mod: PBBFAC,,, | Performed by: OPHTHALMOLOGY

## 2021-07-20 PROCEDURE — 99213 OFFICE O/P EST LOW 20 MIN: CPT | Mod: PBBFAC | Performed by: OPHTHALMOLOGY

## 2021-07-20 PROCEDURE — 92201 OPSCPY EXTND RTA DRAW UNI/BI: CPT | Mod: PBBFAC | Performed by: OPHTHALMOLOGY

## 2021-07-20 PROCEDURE — 92014 PR EYE EXAM, EST PATIENT,COMPREHESV: ICD-10-PCS | Mod: S$PBB,,, | Performed by: OPHTHALMOLOGY

## 2021-07-20 PROCEDURE — 92201 PR OPHTHALMOSCOPY, EXT, W/RET DRAW/SCLERAL DEPR, I&R, UNI/BI: ICD-10-PCS | Mod: S$PBB,,, | Performed by: OPHTHALMOLOGY

## 2021-07-20 PROCEDURE — 99999 PR PBB SHADOW E&M-EST. PATIENT-LVL III: ICD-10-PCS | Mod: PBBFAC,,, | Performed by: OPHTHALMOLOGY

## 2021-07-22 RX ORDER — HYDROCODONE BITARTRATE AND ACETAMINOPHEN 5; 325 MG/1; MG/1
1 TABLET ORAL EVERY 6 HOURS PRN
Qty: 60 TABLET | Refills: 0 | Status: SHIPPED | OUTPATIENT
Start: 2021-07-22 | End: 2021-08-20 | Stop reason: SDUPTHER

## 2021-07-30 ENCOUNTER — LAB VISIT (OUTPATIENT)
Dept: LAB | Facility: HOSPITAL | Age: 86
End: 2021-07-30
Attending: NURSE PRACTITIONER
Payer: MEDICARE

## 2021-07-30 DIAGNOSIS — E78.5 HYPERLIPIDEMIA ASSOCIATED WITH TYPE 2 DIABETES MELLITUS: ICD-10-CM

## 2021-07-30 DIAGNOSIS — E11.69 HYPERLIPIDEMIA ASSOCIATED WITH TYPE 2 DIABETES MELLITUS: ICD-10-CM

## 2021-07-30 DIAGNOSIS — E11.42 DIABETIC POLYNEUROPATHY ASSOCIATED WITH TYPE 2 DIABETES MELLITUS: ICD-10-CM

## 2021-07-30 DIAGNOSIS — I10 ESSENTIAL HYPERTENSION: ICD-10-CM

## 2021-07-30 LAB
ALBUMIN SERPL BCP-MCNC: 3.9 G/DL (ref 3.5–5.2)
ALP SERPL-CCNC: 52 U/L (ref 55–135)
ALT SERPL W/O P-5'-P-CCNC: 7 U/L (ref 10–44)
ANION GAP SERPL CALC-SCNC: 10 MMOL/L (ref 8–16)
AST SERPL-CCNC: 16 U/L (ref 10–40)
BASOPHILS # BLD AUTO: 0.03 K/UL (ref 0–0.2)
BASOPHILS NFR BLD: 0.4 % (ref 0–1.9)
BILIRUB SERPL-MCNC: 0.5 MG/DL (ref 0.1–1)
BUN SERPL-MCNC: 25 MG/DL (ref 8–23)
CALCIUM SERPL-MCNC: 9.7 MG/DL (ref 8.7–10.5)
CHLORIDE SERPL-SCNC: 102 MMOL/L (ref 95–110)
CHOLEST SERPL-MCNC: 138 MG/DL (ref 120–199)
CHOLEST/HDLC SERPL: 2.6 {RATIO} (ref 2–5)
CO2 SERPL-SCNC: 24 MMOL/L (ref 23–29)
CREAT SERPL-MCNC: 0.9 MG/DL (ref 0.5–1.4)
DIFFERENTIAL METHOD: ABNORMAL
EOSINOPHIL # BLD AUTO: 0.1 K/UL (ref 0–0.5)
EOSINOPHIL NFR BLD: 1.7 % (ref 0–8)
ERYTHROCYTE [DISTWIDTH] IN BLOOD BY AUTOMATED COUNT: 13.1 % (ref 11.5–14.5)
EST. GFR  (AFRICAN AMERICAN): >60 ML/MIN/1.73 M^2
EST. GFR  (NON AFRICAN AMERICAN): >60 ML/MIN/1.73 M^2
ESTIMATED AVG GLUCOSE: 117 MG/DL (ref 68–131)
GLUCOSE SERPL-MCNC: 90 MG/DL (ref 70–110)
HBA1C MFR BLD: 5.7 % (ref 4–5.6)
HCT VFR BLD AUTO: 35.4 % (ref 40–54)
HDLC SERPL-MCNC: 54 MG/DL (ref 40–75)
HDLC SERPL: 39.1 % (ref 20–50)
HGB BLD-MCNC: 11.5 G/DL (ref 14–18)
IMM GRANULOCYTES # BLD AUTO: 0.02 K/UL (ref 0–0.04)
IMM GRANULOCYTES NFR BLD AUTO: 0.3 % (ref 0–0.5)
LDLC SERPL CALC-MCNC: 66.2 MG/DL (ref 63–159)
LYMPHOCYTES # BLD AUTO: 1.9 K/UL (ref 1–4.8)
LYMPHOCYTES NFR BLD: 26.9 % (ref 18–48)
MCH RBC QN AUTO: 29.6 PG (ref 27–31)
MCHC RBC AUTO-ENTMCNC: 32.5 G/DL (ref 32–36)
MCV RBC AUTO: 91 FL (ref 82–98)
MONOCYTES # BLD AUTO: 0.6 K/UL (ref 0.3–1)
MONOCYTES NFR BLD: 8.3 % (ref 4–15)
NEUTROPHILS # BLD AUTO: 4.4 K/UL (ref 1.8–7.7)
NEUTROPHILS NFR BLD: 62.4 % (ref 38–73)
NONHDLC SERPL-MCNC: 84 MG/DL
NRBC BLD-RTO: 0 /100 WBC
PLATELET # BLD AUTO: 194 K/UL (ref 150–450)
PMV BLD AUTO: 9.4 FL (ref 9.2–12.9)
POTASSIUM SERPL-SCNC: 4.2 MMOL/L (ref 3.5–5.1)
PROT SERPL-MCNC: 7.1 G/DL (ref 6–8.4)
RBC # BLD AUTO: 3.88 M/UL (ref 4.6–6.2)
SODIUM SERPL-SCNC: 136 MMOL/L (ref 136–145)
TRIGL SERPL-MCNC: 89 MG/DL (ref 30–150)
WBC # BLD AUTO: 7.07 K/UL (ref 3.9–12.7)

## 2021-07-30 PROCEDURE — 80061 LIPID PANEL: CPT | Performed by: NURSE PRACTITIONER

## 2021-07-30 PROCEDURE — 85025 COMPLETE CBC W/AUTO DIFF WBC: CPT | Performed by: NURSE PRACTITIONER

## 2021-07-30 PROCEDURE — 36415 COLL VENOUS BLD VENIPUNCTURE: CPT | Mod: PO | Performed by: NURSE PRACTITIONER

## 2021-07-30 PROCEDURE — 83036 HEMOGLOBIN GLYCOSYLATED A1C: CPT | Performed by: NURSE PRACTITIONER

## 2021-07-30 PROCEDURE — 80053 COMPREHEN METABOLIC PANEL: CPT | Performed by: NURSE PRACTITIONER

## 2021-08-04 ENCOUNTER — OFFICE VISIT (OUTPATIENT)
Dept: INTERNAL MEDICINE | Facility: CLINIC | Age: 86
End: 2021-08-04
Payer: MEDICARE

## 2021-08-04 ENCOUNTER — LAB VISIT (OUTPATIENT)
Dept: PRIMARY CARE CLINIC | Facility: CLINIC | Age: 86
End: 2021-08-04

## 2021-08-04 VITALS
HEIGHT: 66 IN | SYSTOLIC BLOOD PRESSURE: 130 MMHG | DIASTOLIC BLOOD PRESSURE: 74 MMHG | WEIGHT: 141.56 LBS | HEART RATE: 68 BPM | BODY MASS INDEX: 22.75 KG/M2

## 2021-08-04 DIAGNOSIS — R05.9 COUGH: ICD-10-CM

## 2021-08-04 DIAGNOSIS — E11.42 CONTROLLED TYPE 2 DIABETES MELLITUS WITH DIABETIC POLYNEUROPATHY, WITHOUT LONG-TERM CURRENT USE OF INSULIN: Primary | ICD-10-CM

## 2021-08-04 DIAGNOSIS — I15.2 HYPERTENSION ASSOCIATED WITH DIABETES: ICD-10-CM

## 2021-08-04 DIAGNOSIS — I70.0 ATHEROSCLEROSIS OF AORTA: ICD-10-CM

## 2021-08-04 DIAGNOSIS — E11.59 HYPERTENSION ASSOCIATED WITH DIABETES: ICD-10-CM

## 2021-08-04 PROCEDURE — 99214 OFFICE O/P EST MOD 30 MIN: CPT | Mod: S$PBB,,, | Performed by: INTERNAL MEDICINE

## 2021-08-04 PROCEDURE — 99999 PR PBB SHADOW E&M-EST. PATIENT-LVL III: ICD-10-PCS | Mod: PBBFAC,,, | Performed by: INTERNAL MEDICINE

## 2021-08-04 PROCEDURE — 99214 PR OFFICE/OUTPT VISIT, EST, LEVL IV, 30-39 MIN: ICD-10-PCS | Mod: S$PBB,,, | Performed by: INTERNAL MEDICINE

## 2021-08-04 PROCEDURE — 99999 PR PBB SHADOW E&M-EST. PATIENT-LVL III: CPT | Mod: PBBFAC,,, | Performed by: INTERNAL MEDICINE

## 2021-08-04 PROCEDURE — 99213 OFFICE O/P EST LOW 20 MIN: CPT | Mod: PBBFAC | Performed by: INTERNAL MEDICINE

## 2021-08-20 DIAGNOSIS — M54.9 BACK PAIN, UNSPECIFIED BACK LOCATION, UNSPECIFIED BACK PAIN LATERALITY, UNSPECIFIED CHRONICITY: ICD-10-CM

## 2021-08-20 RX ORDER — HYDROCODONE BITARTRATE AND ACETAMINOPHEN 5; 325 MG/1; MG/1
1 TABLET ORAL EVERY 6 HOURS PRN
Qty: 60 TABLET | Refills: 0 | Status: SHIPPED | OUTPATIENT
Start: 2021-08-20 | End: 2021-09-21 | Stop reason: SDUPTHER

## 2021-08-26 ENCOUNTER — PATIENT OUTREACH (OUTPATIENT)
Dept: ADMINISTRATIVE | Facility: OTHER | Age: 86
End: 2021-08-26

## 2021-08-27 ENCOUNTER — OFFICE VISIT (OUTPATIENT)
Dept: OPHTHALMOLOGY | Facility: CLINIC | Age: 86
End: 2021-08-27
Payer: MEDICARE

## 2021-08-27 DIAGNOSIS — H40.89 GLAUCOMA DUE TO COMBINATION OF MECHANISMS: Primary | ICD-10-CM

## 2021-08-27 DIAGNOSIS — H40.053 OCULAR HYPERTENSION, BILATERAL: ICD-10-CM

## 2021-08-27 DIAGNOSIS — H18.899 IRREGULAR SURFACE OF CORNEA: ICD-10-CM

## 2021-08-27 DIAGNOSIS — H40.023 OPEN ANGLE WITH BORDERLINE FINDINGS AND HIGH GLAUCOMA RISK IN BOTH EYES: ICD-10-CM

## 2021-08-27 DIAGNOSIS — H40.042 STEROID RESPONDER, LEFT EYE: ICD-10-CM

## 2021-08-27 DIAGNOSIS — H40.033 ANATOMICAL NARROW ANGLE BORDERLINE GLAUCOMA OF BOTH EYES: ICD-10-CM

## 2021-08-27 DIAGNOSIS — Z98.890 S/P LASER IRIDOTOMY: ICD-10-CM

## 2021-08-27 DIAGNOSIS — H34.8110 HEMISPHERIC RETINAL VEIN OCCLUSION WITH MACULAR EDEMA OF RIGHT EYE: ICD-10-CM

## 2021-08-27 DIAGNOSIS — H25.11 NUCLEAR SCLEROTIC CATARACT OF RIGHT EYE: ICD-10-CM

## 2021-08-27 DIAGNOSIS — H35.033 HYPERTENSIVE RETINOPATHY, BILATERAL: ICD-10-CM

## 2021-08-27 DIAGNOSIS — Z96.1 PSEUDOPHAKIA, LEFT EYE: ICD-10-CM

## 2021-08-27 DIAGNOSIS — H57.03 SMALL PUPILS: ICD-10-CM

## 2021-08-27 PROCEDURE — 92012 INTRM OPH EXAM EST PATIENT: CPT | Mod: S$PBB,,, | Performed by: OPHTHALMOLOGY

## 2021-08-27 PROCEDURE — 99999 PR PBB SHADOW E&M-EST. PATIENT-LVL III: ICD-10-PCS | Mod: PBBFAC,,, | Performed by: OPHTHALMOLOGY

## 2021-08-27 PROCEDURE — 99213 OFFICE O/P EST LOW 20 MIN: CPT | Mod: PBBFAC | Performed by: OPHTHALMOLOGY

## 2021-08-27 PROCEDURE — 92012 PR EYE EXAM, EST PATIENT,INTERMED: ICD-10-PCS | Mod: S$PBB,,, | Performed by: OPHTHALMOLOGY

## 2021-08-27 PROCEDURE — 99999 PR PBB SHADOW E&M-EST. PATIENT-LVL III: CPT | Mod: PBBFAC,,, | Performed by: OPHTHALMOLOGY

## 2021-08-27 RX ORDER — BRIMONIDINE TARTRATE AND TIMOLOL MALEATE 2; 5 MG/ML; MG/ML
1 SOLUTION OPHTHALMIC 2 TIMES DAILY
Qty: 15 ML | Refills: 3 | Status: SHIPPED | OUTPATIENT
Start: 2021-08-27 | End: 2022-04-05 | Stop reason: SDUPTHER

## 2021-08-27 RX ORDER — LATANOPROST 50 UG/ML
1 SOLUTION/ DROPS OPHTHALMIC DAILY
Qty: 7.5 ML | Refills: 3 | Status: SHIPPED | OUTPATIENT
Start: 2021-08-27 | End: 2022-05-12

## 2021-09-21 DIAGNOSIS — M54.9 BACK PAIN, UNSPECIFIED BACK LOCATION, UNSPECIFIED BACK PAIN LATERALITY, UNSPECIFIED CHRONICITY: ICD-10-CM

## 2021-09-21 RX ORDER — HYDROCODONE BITARTRATE AND ACETAMINOPHEN 5; 325 MG/1; MG/1
1 TABLET ORAL EVERY 6 HOURS PRN
Qty: 60 TABLET | Refills: 0 | Status: SHIPPED | OUTPATIENT
Start: 2021-09-21 | End: 2021-10-21 | Stop reason: SDUPTHER

## 2021-10-21 DIAGNOSIS — M54.9 BACK PAIN, UNSPECIFIED BACK LOCATION, UNSPECIFIED BACK PAIN LATERALITY, UNSPECIFIED CHRONICITY: ICD-10-CM

## 2021-10-21 RX ORDER — PRAVASTATIN SODIUM 10 MG/1
10 TABLET ORAL DAILY
Qty: 90 TABLET | Refills: 3 | Status: SHIPPED | OUTPATIENT
Start: 2021-10-21 | End: 2022-10-17

## 2021-10-21 RX ORDER — HYDROCODONE BITARTRATE AND ACETAMINOPHEN 5; 325 MG/1; MG/1
1 TABLET ORAL EVERY 6 HOURS PRN
Qty: 60 TABLET | Refills: 0 | Status: SHIPPED | OUTPATIENT
Start: 2021-10-21 | End: 2021-11-17 | Stop reason: SDUPTHER

## 2021-10-21 RX ORDER — HYDROCHLOROTHIAZIDE 25 MG/1
25 TABLET ORAL DAILY
Qty: 90 TABLET | Refills: 3 | Status: SHIPPED | OUTPATIENT
Start: 2021-10-21 | End: 2022-10-14

## 2021-10-25 ENCOUNTER — OFFICE VISIT (OUTPATIENT)
Dept: OPHTHALMOLOGY | Facility: CLINIC | Age: 86
End: 2021-10-25
Payer: MEDICARE

## 2021-10-25 DIAGNOSIS — H35.033 HYPERTENSIVE RETINOPATHY, BILATERAL: ICD-10-CM

## 2021-10-25 DIAGNOSIS — H34.8110 HEMISPHERIC RETINAL VEIN OCCLUSION WITH MACULAR EDEMA OF RIGHT EYE: Primary | ICD-10-CM

## 2021-10-25 PROCEDURE — 92134 CPTRZ OPH DX IMG PST SGM RTA: CPT | Mod: PBBFAC | Performed by: OPHTHALMOLOGY

## 2021-10-25 PROCEDURE — 92134 POSTERIOR SEGMENT OCT RETINA (OCULAR COHERENCE TOMOGRAPHY)-BOTH EYES: ICD-10-PCS | Mod: 26,S$PBB,, | Performed by: OPHTHALMOLOGY

## 2021-10-25 PROCEDURE — 99213 OFFICE O/P EST LOW 20 MIN: CPT | Mod: PBBFAC | Performed by: OPHTHALMOLOGY

## 2021-10-25 PROCEDURE — 99999 PR PBB SHADOW E&M-EST. PATIENT-LVL III: CPT | Mod: PBBFAC,,, | Performed by: OPHTHALMOLOGY

## 2021-10-25 PROCEDURE — 92014 COMPRE OPH EXAM EST PT 1/>: CPT | Mod: S$PBB,,, | Performed by: OPHTHALMOLOGY

## 2021-10-25 PROCEDURE — 92014 PR EYE EXAM, EST PATIENT,COMPREHESV: ICD-10-PCS | Mod: S$PBB,,, | Performed by: OPHTHALMOLOGY

## 2021-10-25 PROCEDURE — 99999 PR PBB SHADOW E&M-EST. PATIENT-LVL III: ICD-10-PCS | Mod: PBBFAC,,, | Performed by: OPHTHALMOLOGY

## 2021-10-25 PROCEDURE — 92201 PR OPHTHALMOSCOPY, EXT, W/RET DRAW/SCLERAL DEPR, I&R, UNI/BI: ICD-10-PCS | Mod: S$PBB,,, | Performed by: OPHTHALMOLOGY

## 2021-10-25 PROCEDURE — 92201 OPSCPY EXTND RTA DRAW UNI/BI: CPT | Mod: S$PBB,,, | Performed by: OPHTHALMOLOGY

## 2021-10-25 PROCEDURE — 92201 OPSCPY EXTND RTA DRAW UNI/BI: CPT | Mod: PBBFAC | Performed by: OPHTHALMOLOGY

## 2021-11-17 ENCOUNTER — TELEPHONE (OUTPATIENT)
Dept: INTERNAL MEDICINE | Facility: CLINIC | Age: 86
End: 2021-11-17
Payer: MEDICARE

## 2021-11-17 DIAGNOSIS — M54.9 BACK PAIN, UNSPECIFIED BACK LOCATION, UNSPECIFIED BACK PAIN LATERALITY, UNSPECIFIED CHRONICITY: ICD-10-CM

## 2021-11-17 RX ORDER — HYDROCODONE BITARTRATE AND ACETAMINOPHEN 5; 325 MG/1; MG/1
1 TABLET ORAL EVERY 6 HOURS PRN
Qty: 60 TABLET | Refills: 0 | Status: SHIPPED | OUTPATIENT
Start: 2021-11-17 | End: 2021-12-20 | Stop reason: SDUPTHER

## 2021-12-20 DIAGNOSIS — M54.9 BACK PAIN, UNSPECIFIED BACK LOCATION, UNSPECIFIED BACK PAIN LATERALITY, UNSPECIFIED CHRONICITY: ICD-10-CM

## 2021-12-20 RX ORDER — HYDROCODONE BITARTRATE AND ACETAMINOPHEN 5; 325 MG/1; MG/1
1 TABLET ORAL EVERY 6 HOURS PRN
Qty: 60 TABLET | Refills: 0 | Status: SHIPPED | OUTPATIENT
Start: 2021-12-20 | End: 2022-01-20 | Stop reason: SDUPTHER

## 2022-01-20 DIAGNOSIS — M54.9 BACK PAIN, UNSPECIFIED BACK LOCATION, UNSPECIFIED BACK PAIN LATERALITY, UNSPECIFIED CHRONICITY: ICD-10-CM

## 2022-01-20 NOTE — TELEPHONE ENCOUNTER
No new care gaps identified.  Powered by [email or web address]. Reference number: [id number].   1/20/2022 3:10:12 PM CST

## 2022-01-21 RX ORDER — HYDROCODONE BITARTRATE AND ACETAMINOPHEN 5; 325 MG/1; MG/1
1 TABLET ORAL EVERY 6 HOURS PRN
Qty: 60 TABLET | Refills: 0 | Status: SHIPPED | OUTPATIENT
Start: 2022-01-21 | End: 2022-02-21 | Stop reason: SDUPTHER

## 2022-02-06 DIAGNOSIS — I10 ESSENTIAL HYPERTENSION: ICD-10-CM

## 2022-02-08 RX ORDER — METFORMIN HYDROCHLORIDE 500 MG/1
TABLET ORAL
Qty: 180 TABLET | Refills: 3 | Status: SHIPPED | OUTPATIENT
Start: 2022-02-08 | End: 2022-09-07 | Stop reason: DRUGHIGH

## 2022-02-08 RX ORDER — LISINOPRIL 20 MG/1
TABLET ORAL
Qty: 90 TABLET | Refills: 1 | Status: SHIPPED | OUTPATIENT
Start: 2022-02-08 | End: 2022-08-08

## 2022-02-21 DIAGNOSIS — M54.9 BACK PAIN, UNSPECIFIED BACK LOCATION, UNSPECIFIED BACK PAIN LATERALITY, UNSPECIFIED CHRONICITY: ICD-10-CM

## 2022-02-21 RX ORDER — HYDROCODONE BITARTRATE AND ACETAMINOPHEN 5; 325 MG/1; MG/1
1 TABLET ORAL EVERY 6 HOURS PRN
Qty: 60 TABLET | Refills: 0 | Status: SHIPPED | OUTPATIENT
Start: 2022-02-21 | End: 2022-03-21 | Stop reason: SDUPTHER

## 2022-02-22 ENCOUNTER — LAB VISIT (OUTPATIENT)
Dept: LAB | Facility: HOSPITAL | Age: 87
End: 2022-02-22
Attending: INTERNAL MEDICINE
Payer: MEDICARE

## 2022-02-22 DIAGNOSIS — E11.42 CONTROLLED TYPE 2 DIABETES MELLITUS WITH DIABETIC POLYNEUROPATHY, WITHOUT LONG-TERM CURRENT USE OF INSULIN: ICD-10-CM

## 2022-02-22 LAB
ANION GAP SERPL CALC-SCNC: 12 MMOL/L (ref 8–16)
BUN SERPL-MCNC: 13 MG/DL (ref 8–23)
CALCIUM SERPL-MCNC: 9.3 MG/DL (ref 8.7–10.5)
CHLORIDE SERPL-SCNC: 95 MMOL/L (ref 95–110)
CO2 SERPL-SCNC: 26 MMOL/L (ref 23–29)
CREAT SERPL-MCNC: 0.8 MG/DL (ref 0.5–1.4)
EST. GFR  (AFRICAN AMERICAN): >60 ML/MIN/1.73 M^2
EST. GFR  (NON AFRICAN AMERICAN): >60 ML/MIN/1.73 M^2
ESTIMATED AVG GLUCOSE: 111 MG/DL (ref 68–131)
GLUCOSE SERPL-MCNC: 96 MG/DL (ref 70–110)
HBA1C MFR BLD: 5.5 % (ref 4–5.6)
POTASSIUM SERPL-SCNC: 4.1 MMOL/L (ref 3.5–5.1)
SODIUM SERPL-SCNC: 133 MMOL/L (ref 136–145)

## 2022-02-22 PROCEDURE — 83036 HEMOGLOBIN GLYCOSYLATED A1C: CPT | Performed by: INTERNAL MEDICINE

## 2022-02-22 PROCEDURE — 80048 BASIC METABOLIC PNL TOTAL CA: CPT | Performed by: INTERNAL MEDICINE

## 2022-02-22 PROCEDURE — 36415 COLL VENOUS BLD VENIPUNCTURE: CPT | Mod: PO | Performed by: INTERNAL MEDICINE

## 2022-03-02 ENCOUNTER — OFFICE VISIT (OUTPATIENT)
Dept: INTERNAL MEDICINE | Facility: CLINIC | Age: 87
End: 2022-03-02
Payer: MEDICARE

## 2022-03-02 VITALS
DIASTOLIC BLOOD PRESSURE: 80 MMHG | BODY MASS INDEX: 23.42 KG/M2 | WEIGHT: 145.75 LBS | OXYGEN SATURATION: 99 % | SYSTOLIC BLOOD PRESSURE: 155 MMHG | HEIGHT: 66 IN | HEART RATE: 63 BPM

## 2022-03-02 DIAGNOSIS — I15.2 HYPERTENSION ASSOCIATED WITH DIABETES: ICD-10-CM

## 2022-03-02 DIAGNOSIS — E11.59 HYPERTENSION ASSOCIATED WITH DIABETES: ICD-10-CM

## 2022-03-02 DIAGNOSIS — I70.0 ATHEROSCLEROSIS OF AORTA: ICD-10-CM

## 2022-03-02 DIAGNOSIS — R26.81 GAIT INSTABILITY: ICD-10-CM

## 2022-03-02 DIAGNOSIS — E11.42 CONTROLLED TYPE 2 DIABETES MELLITUS WITH DIABETIC POLYNEUROPATHY, WITHOUT LONG-TERM CURRENT USE OF INSULIN: Primary | ICD-10-CM

## 2022-03-02 PROCEDURE — 99999 PR PBB SHADOW E&M-EST. PATIENT-LVL IV: ICD-10-PCS | Mod: PBBFAC,,, | Performed by: INTERNAL MEDICINE

## 2022-03-02 PROCEDURE — 99214 PR OFFICE/OUTPT VISIT, EST, LEVL IV, 30-39 MIN: ICD-10-PCS | Mod: S$PBB,,, | Performed by: INTERNAL MEDICINE

## 2022-03-02 PROCEDURE — 99214 OFFICE O/P EST MOD 30 MIN: CPT | Mod: PBBFAC | Performed by: INTERNAL MEDICINE

## 2022-03-02 PROCEDURE — 99999 PR PBB SHADOW E&M-EST. PATIENT-LVL IV: CPT | Mod: PBBFAC,,, | Performed by: INTERNAL MEDICINE

## 2022-03-02 PROCEDURE — 99214 OFFICE O/P EST MOD 30 MIN: CPT | Mod: S$PBB,,, | Performed by: INTERNAL MEDICINE

## 2022-03-02 NOTE — PROGRESS NOTES
Subjective:       Patient ID: Yogesh Shay is a 87 y.o. male.    Chief Complaint: Hypertension and Diabetes    Hypertension  This is a chronic problem. The problem is unchanged. The problem is controlled. Pertinent negatives include no chest pain, palpitations or shortness of breath. The current treatment provides significant improvement. There are no compliance problems.    Diabetes  He presents for his follow-up diabetic visit. He has type 2 diabetes mellitus. His disease course has been stable. There are no hypoglycemic associated symptoms. Pertinent negatives for hypoglycemia include no speech difficulty. There are no diabetic associated symptoms. Pertinent negatives for diabetes include no chest pain, no fatigue and no weakness. Symptoms are stable.     Review of Systems   Constitutional: Positive for activity change (had a couple of falls recently-  hit his head-  now recovered). Negative for fatigue.   HENT: Negative for sore throat.    Eyes: Negative for visual disturbance.   Respiratory: Negative for shortness of breath.    Cardiovascular: Negative for chest pain and palpitations.   Gastrointestinal: Negative for abdominal pain.   Genitourinary: Negative for dysuria, frequency and hematuria.   Musculoskeletal: Negative for joint swelling.   Skin: Negative for rash.   Neurological: Negative for speech difficulty and weakness.   Psychiatric/Behavioral: Negative for dysphoric mood.       Objective:      Physical Exam  Vitals reviewed.   Constitutional:       General: He is not in acute distress.     Appearance: He is well-developed.   HENT:      Head: Normocephalic and atraumatic.   Eyes:      Conjunctiva/sclera: Conjunctivae normal.      Pupils: Pupils are equal, round, and reactive to light.   Cardiovascular:      Rate and Rhythm: Normal rate and regular rhythm.      Heart sounds: Normal heart sounds.   Pulmonary:      Effort: Pulmonary effort is normal.      Breath sounds: Normal breath sounds. No  wheezing.   Abdominal:      General: Bowel sounds are normal.      Palpations: Abdomen is soft.      Tenderness: There is no abdominal tenderness.   Musculoskeletal:         General: No tenderness. Normal range of motion.      Cervical back: Normal range of motion and neck supple.   Skin:     Findings: No erythema.   Neurological:      Mental Status: He is alert and oriented to person, place, and time.      Cranial Nerves: No cranial nerve deficit.         Assessment:       1. Controlled type 2 diabetes mellitus with diabetic polyneuropathy, without long-term current use of insulin    2. Gait instability    3. Hypertension associated with diabetes    4. Atherosclerosis of aorta        Plan:       Yogesh was seen today for hypertension and diabetes.    Diagnoses and all orders for this visit:    Controlled type 2 diabetes mellitus with diabetic polyneuropathy, without long-term current use of insulin  -     Basic Metabolic Panel; Future  -     Hemoglobin A1C; Future    Gait instability  Comments:  long discussion with daughter about activity level and advanced care planning-  wife refuses to consider a move.  Orders:  -     Ambulatory referral/consult to Physical/Occupational Therapy; Future    Hypertension associated with diabetes  Comments:  BP ok at home-  high here due to upset in lobby    Atherosclerosis of aorta  Comments:  stable regimen, no symptoms, not a surgical candidate        Follow up in about 6 months (around 9/2/2022) for F/U WITH ME OR APC, WITH LAB BEFORE.

## 2022-03-21 DIAGNOSIS — R10.13 CONTINUOUS EPIGASTRIC PAIN: ICD-10-CM

## 2022-03-21 DIAGNOSIS — M54.9 BACK PAIN, UNSPECIFIED BACK LOCATION, UNSPECIFIED BACK PAIN LATERALITY, UNSPECIFIED CHRONICITY: ICD-10-CM

## 2022-03-21 NOTE — TELEPHONE ENCOUNTER
----- Message from Marily Shearer sent at 3/21/2022  8:28 AM CDT -----  Contact: self [phone]  Pt requesting a call in regards to RX that not listed on med list.    Please call and advise

## 2022-03-21 NOTE — TELEPHONE ENCOUNTER
No new care gaps identified.  Powered by [employer]. Reference number: [id number].   3/21/2022 11:50:51 AM CDT

## 2022-03-22 RX ORDER — HYDROCODONE BITARTRATE AND ACETAMINOPHEN 5; 325 MG/1; MG/1
1 TABLET ORAL EVERY 6 HOURS PRN
Qty: 60 TABLET | Refills: 0 | Status: SHIPPED | OUTPATIENT
Start: 2022-03-22 | End: 2022-04-21 | Stop reason: SDUPTHER

## 2022-03-22 RX ORDER — ONDANSETRON 8 MG/1
TABLET, ORALLY DISINTEGRATING ORAL
Qty: 30 TABLET | Refills: 0 | Status: SHIPPED | OUTPATIENT
Start: 2022-03-22 | End: 2023-08-03 | Stop reason: ALTCHOICE

## 2022-04-05 ENCOUNTER — CLINICAL SUPPORT (OUTPATIENT)
Dept: OPHTHALMOLOGY | Facility: CLINIC | Age: 87
End: 2022-04-05
Payer: MEDICARE

## 2022-04-05 ENCOUNTER — OFFICE VISIT (OUTPATIENT)
Dept: OPHTHALMOLOGY | Facility: CLINIC | Age: 87
End: 2022-04-05
Payer: MEDICARE

## 2022-04-05 DIAGNOSIS — Z98.890 S/P LASER IRIDOTOMY: ICD-10-CM

## 2022-04-05 DIAGNOSIS — H40.153: ICD-10-CM

## 2022-04-05 DIAGNOSIS — H40.053 OCULAR HYPERTENSION, BILATERAL: ICD-10-CM

## 2022-04-05 DIAGNOSIS — H25.11 NUCLEAR SCLEROTIC CATARACT OF RIGHT EYE: ICD-10-CM

## 2022-04-05 DIAGNOSIS — H40.042 STEROID RESPONDER, LEFT EYE: ICD-10-CM

## 2022-04-05 DIAGNOSIS — H40.89 GLAUCOMA DUE TO COMBINATION OF MECHANISMS: Primary | ICD-10-CM

## 2022-04-05 DIAGNOSIS — Z96.1 PSEUDOPHAKIA, LEFT EYE: ICD-10-CM

## 2022-04-05 DIAGNOSIS — H40.89 GLAUCOMA DUE TO COMBINATION OF MECHANISMS: ICD-10-CM

## 2022-04-05 DIAGNOSIS — H34.8110 HEMISPHERIC RETINAL VEIN OCCLUSION WITH MACULAR EDEMA OF RIGHT EYE: ICD-10-CM

## 2022-04-05 PROCEDURE — 99999 PR PBB SHADOW E&M-EST. PATIENT-LVL III: CPT | Mod: PBBFAC,,, | Performed by: OPHTHALMOLOGY

## 2022-04-05 PROCEDURE — 92133 CPTRZD OPH DX IMG PST SGM ON: CPT | Mod: PBBFAC | Performed by: OPHTHALMOLOGY

## 2022-04-05 PROCEDURE — 92014 COMPRE OPH EXAM EST PT 1/>: CPT | Mod: S$PBB,,, | Performed by: OPHTHALMOLOGY

## 2022-04-05 PROCEDURE — 92014 PR EYE EXAM, EST PATIENT,COMPREHESV: ICD-10-PCS | Mod: S$PBB,,, | Performed by: OPHTHALMOLOGY

## 2022-04-05 PROCEDURE — 92133 POSTERIOR SEGMENT OCT OPTIC NERVE(OCULAR COHERENCE TOMOGRAPHY) - OU - BOTH EYES: ICD-10-PCS | Mod: 26,S$PBB,, | Performed by: OPHTHALMOLOGY

## 2022-04-05 PROCEDURE — 99213 OFFICE O/P EST LOW 20 MIN: CPT | Mod: PBBFAC | Performed by: OPHTHALMOLOGY

## 2022-04-05 PROCEDURE — 99999 PR PBB SHADOW E&M-EST. PATIENT-LVL III: ICD-10-PCS | Mod: PBBFAC,,, | Performed by: OPHTHALMOLOGY

## 2022-04-05 RX ORDER — BRIMONIDINE TARTRATE AND TIMOLOL MALEATE 2; 5 MG/ML; MG/ML
1 SOLUTION OPHTHALMIC 2 TIMES DAILY
Qty: 15 ML | Refills: 3
Start: 2022-04-05 | End: 2022-08-26

## 2022-04-05 NOTE — PROGRESS NOTES
HPI     Glaucoma     Comments: HVF and OCT review today and pt states no changes since last   exam               Comments     DLS: 8/04/20    DLS: 8/27/21    1. MMG   2. Hx Narrow Angles OU   3. NS OD  4. Type 2 DM no BDR   5. Hx Tumor/Cancer RLL   6. PCIOL OS  7. 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 (History)            Assessment /Plan     For exam results, see Encounter Report.    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  Dispense: 15 mL; Refill: 3    Ocular hypertension, bilateral    Hemispheric retinal vein occlusion with macular edema of right eye    Nuclear sclerotic cataract of right eye    Pseudophakia, left eye    S/P laser iridotomy            RETIRED FROM THE [employer] // STILL HAS A [place] GROVE THAT HE FARMS   VERY ACTIVE STILL          Glaucoma (type and duration)    Narrow angles - S/P PI's ou // + residual open angle glaucoma -   First HVF   8/2018 - poor VF test taker    First photos   6/2018   Treatment / Drops started   Lat ou - started 6/15/2018            Family history    ?        Glaucoma meds    Latanoprost ou - started 6/15/2018         H/O adverse rxn to glaucoma drops    none        LASERS    Laser PI's 4/2018 - od // OS 5/2018         GLAUCOMA SURGERIES    none        OTHER EYE SURGERIES    Phaco/IOL os - 12/16/2020        CDR    0.7/0.4         Tbase    19-27  od // 22-40 os         Tmax     27/40          Ttarget    ?             HVF    3 test 2018 to  2021 - unreliable  od // unreliable  Os (improved ou)         Gonio    +3 w/ steep approach - post PI's ou         CCT    518 / 528        OCT    3 test 2018 to 2022 - RNFL - Full od  // dec G/TS os         HRT    2 test 2018 to 2019 - MR - high std dev. od // high std dev os        Disc photos    6/2018     - Ttoday    28/20   Up form 19/18   HVF ou // DFE // OCT     2. NS OD      (also right eye has a HRVO so ? potential    ON FLOWMAX    Had a lot of corneal surface changes and slow recovery of vision post CE os - eventually did well     3. Diabetes    No BDR seen on exam 6/15/2018     4. H/O tumor / cancer RLL    S/P removal and re-construction at [facility]    The biopsy was done by amanda - but the surgery was done by some one else     5. Has some abdominal issues    Having surgery next week - 3/15/2018     6. Episopde of blurry vision ou    At [facility] 2018 - resolved    7. Hemiretinal vein occulusion (superior)  with macular edema, right eye vs sup BRVO w/   No NVI   On ASA 81 mg    Following with Mazzulla    - has seen Josezulla and no CME at that time, to f/u in 3 months    8. PC IOL os - complex - shallow AC / floppy iris / xiomy ring - PCB00 23.5- 12/16/2020   Had slow visual recovery - got a lot of corneal surface changes    Eventually recovered to 20/40         PLAN   For MMG  IOP up od    - CPM with latanoprost qHS OU  - change combigan from OS to OU     -  IV injection OD with Mazzulla -- 12/22/2020 - for BRVO w/ ME     Ok to use +2.75 readers     F/U 3  Months for gonio and  IOP check with addition of combigan od - if IOP still high od when sees raffaele in 2 weeks can be sent back and seen sooner. If needs CE od to try and better monitor and treat the retina or to better control the IOP can be done - but it will likely NOT improve vision as has extensive CME od 2/2 HRVO - REVIEW RETINA NOTES

## 2022-04-21 ENCOUNTER — TELEPHONE (OUTPATIENT)
Dept: OPHTHALMOLOGY | Facility: CLINIC | Age: 87
End: 2022-04-21
Payer: MEDICARE

## 2022-04-21 DIAGNOSIS — M54.9 BACK PAIN, UNSPECIFIED BACK LOCATION, UNSPECIFIED BACK PAIN LATERALITY, UNSPECIFIED CHRONICITY: ICD-10-CM

## 2022-04-21 NOTE — TELEPHONE ENCOUNTER
No new care gaps identified.  Powered by [employer]. Reference number: [id number].   4/21/2022 8:54:24 AM CDT

## 2022-04-21 NOTE — TELEPHONE ENCOUNTER
----- Message from Tamika Phelps sent at 4/21/2022  8:48 AM CDT -----  Contact: Patient [phone]  Requesting an RX refill or new RX.  Is this a refill or new RX: refill   RX name and strength (copy/paste from chart):  HYDROcodone-acetaminophen (NORCO) 5-325 mg per tablet  Is this a 30 day or 90 day RX: 60   Pharmacy name and phone # (copy/paste from chart):  [facility]/pharmacy #[id number] - [place], [place] - [address]   Phone:  [phone]  Fax:  [phone]        The doctors have asked that we provide their patients with the following 2 reminders -- prescription refills can take up to 72 hours, and a friendly reminder that in the future you can use your [facility] account to request refills: yes

## 2022-04-25 RX ORDER — HYDROCODONE BITARTRATE AND ACETAMINOPHEN 5; 325 MG/1; MG/1
1 TABLET ORAL EVERY 6 HOURS PRN
Qty: 60 TABLET | Refills: 0 | Status: SHIPPED | OUTPATIENT
Start: 2022-04-25 | End: 2022-05-20 | Stop reason: SDUPTHER

## 2022-05-20 DIAGNOSIS — M54.9 BACK PAIN, UNSPECIFIED BACK LOCATION, UNSPECIFIED BACK PAIN LATERALITY, UNSPECIFIED CHRONICITY: ICD-10-CM

## 2022-05-20 RX ORDER — HYDROCODONE BITARTRATE AND ACETAMINOPHEN 5; 325 MG/1; MG/1
1 TABLET ORAL EVERY 6 HOURS PRN
Qty: 60 TABLET | Refills: 0 | Status: SHIPPED | OUTPATIENT
Start: 2022-05-20 | End: 2022-06-20 | Stop reason: SDUPTHER

## 2022-05-20 NOTE — TELEPHONE ENCOUNTER
Care Due:                  Date            Visit Type   Department     Provider  --------------------------------------------------------------------------------                                EP -                              PRIMARY      [facility]  Last Visit: 03-      CARE ([facility])   MEDICINE       Aubrey KERN  Next Visit: None Scheduled  None         None Found                                                            Last  Test          Frequency    Reason                     Performed    Due Date  --------------------------------------------------------------------------------    CMP.........  12 months..  pravastatin..............  07- 07-    Lipid Panel.  12 months..  pravastatin..............  07- 07-    [employer] Embedded Care Gaps. Reference number: [id number]. 5/20/2022   8:24:07 AM CDT

## 2022-05-20 NOTE — TELEPHONE ENCOUNTER
----- Message from Marily Shearer sent at 5/20/2022  8:20 AM CDT -----  Contact: self [phone]  Requesting an RX refill or new RX.  Is this a refill or new RX: refill  RX name and strength (copy/paste from chart):  HYDROcodone-acetaminophen (NORCO) 5-325 mg per tablet  Is this a 30 day or 90 day RX:   Pharmacy name and phone # (copy/paste from chart):    [facility]/pharmacy #[id number] - [place] - [address]  [address] [address]  Phone: [phone] Fax: [phone]    The doctors have asked that we provide their patients with the following 2 reminders -- prescription refills can take up to 72 hours, and a friendly reminder that in the future you can use your [facility] account to request refills:yes    Please call and advise

## 2022-05-26 ENCOUNTER — OFFICE VISIT (OUTPATIENT)
Dept: OPHTHALMOLOGY | Facility: CLINIC | Age: 87
End: 2022-05-26
Payer: MEDICARE

## 2022-05-26 DIAGNOSIS — H35.033 HYPERTENSIVE RETINOPATHY, BILATERAL: ICD-10-CM

## 2022-05-26 DIAGNOSIS — H34.8110 HEMISPHERIC RETINAL VEIN OCCLUSION WITH MACULAR EDEMA OF RIGHT EYE: Primary | ICD-10-CM

## 2022-05-26 PROCEDURE — 92201 OPSCPY EXTND RTA DRAW UNI/BI: CPT | Mod: S$PBB,,, | Performed by: OPHTHALMOLOGY

## 2022-05-26 PROCEDURE — 92014 PR EYE EXAM, EST PATIENT,COMPREHESV: ICD-10-PCS | Mod: S$PBB,,, | Performed by: OPHTHALMOLOGY

## 2022-05-26 PROCEDURE — 99213 OFFICE O/P EST LOW 20 MIN: CPT | Mod: PBBFAC | Performed by: OPHTHALMOLOGY

## 2022-05-26 PROCEDURE — 92201 PR OPHTHALMOSCOPY, EXT, W/RET DRAW/SCLERAL DEPR, I&R, UNI/BI: ICD-10-PCS | Mod: S$PBB,,, | Performed by: OPHTHALMOLOGY

## 2022-05-26 PROCEDURE — 92201 OPSCPY EXTND RTA DRAW UNI/BI: CPT | Mod: PBBFAC | Performed by: OPHTHALMOLOGY

## 2022-05-26 PROCEDURE — 99999 PR PBB SHADOW E&M-EST. PATIENT-LVL III: ICD-10-PCS | Mod: PBBFAC,,, | Performed by: OPHTHALMOLOGY

## 2022-05-26 PROCEDURE — 99999 PR PBB SHADOW E&M-EST. PATIENT-LVL III: CPT | Mod: PBBFAC,,, | Performed by: OPHTHALMOLOGY

## 2022-05-26 PROCEDURE — 92134 CPTRZ OPH DX IMG PST SGM RTA: CPT | Mod: PBBFAC | Performed by: OPHTHALMOLOGY

## 2022-05-26 PROCEDURE — 92014 COMPRE OPH EXAM EST PT 1/>: CPT | Mod: S$PBB,,, | Performed by: OPHTHALMOLOGY

## 2022-05-26 PROCEDURE — 92134 POSTERIOR SEGMENT OCT RETINA (OCULAR COHERENCE TOMOGRAPHY)-BOTH EYES: ICD-10-PCS | Mod: 26,S$PBB,, | Performed by: OPHTHALMOLOGY

## 2022-05-26 NOTE — PROGRESS NOTES
HPI     Concerns About Ocular Health      Additional comments: 6 mon chk              Comments           OCT - OD -increase in ME  OS - no ME      A/P    1. Superior HRVO OD  increase CME today  S/p Avastin OD x 4  S/p Focal OD 4/21  10/21 some increase in ME - no NV    Increase inl ME - pt would like to continue obs since ASx  Not a candidate for steroids given prior steroid response.    2. HTN Ret OU  BP control    3. NS OD  Ok for CE  PCIOL OS    4. MMG OU  Mgmt per Dr. Romero      12 months OCT

## 2022-06-20 DIAGNOSIS — M54.9 BACK PAIN, UNSPECIFIED BACK LOCATION, UNSPECIFIED BACK PAIN LATERALITY, UNSPECIFIED CHRONICITY: ICD-10-CM

## 2022-06-20 NOTE — TELEPHONE ENCOUNTER
----- Message from Narciso Higuera sent at 6/20/2022  9:50 AM CDT -----  Requesting an RX refill or new RX.  Is this a refill or new RX: Refill  RX name and strength HYDROcodone-acetaminophen (NORCO) 5-325 mg per tablet  Is this a 30 day or 90 day RX:   Pharmacy name and phone # [id number] - [place] - [address]   Phone:  [phone] Fax:  [phone]

## 2022-06-22 RX ORDER — HYDROCODONE BITARTRATE AND ACETAMINOPHEN 5; 325 MG/1; MG/1
1 TABLET ORAL EVERY 6 HOURS PRN
Qty: 60 TABLET | Refills: 0 | Status: SHIPPED | OUTPATIENT
Start: 2022-06-22 | End: 2022-07-21 | Stop reason: SDUPTHER

## 2022-07-11 NOTE — PROGRESS NOTES
HPI     DLS: 4/05/2022    Pt here for 3 Month Check;    Meds;  Combigan BID OU   Latanoprost QHS OU   AT's PRN OU    1. MMG   2. Hx Narrow Angles OU   3. NS OD   4. Type 2 DM no BDR   5. Hx Tumor/Cancer RLL   6. PCIOL OS   7. 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 (History)            Assessment /Plan     For exam results, see Encounter Report.    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    Residual stage of open-angle glaucoma, mild stage, bilateral    Steroid responder, left eye    Ocular hypertension, bilateral    Hemispheric retinal vein occlusion with macular edema of right eye    Hypertensive retinopathy, bilateral    Nuclear sclerotic cataract of right eye    Pseudophakia, left eye    S/P laser iridotomy    Small pupils          RETIRED FROM THE [employer] // STILL HAS A [place] GROVE THAT HE FARMS   VERY ACTIVE STILL          Glaucoma (type and duration)    Narrow angles - S/P PI's ou // + residual open angle glaucoma -   First HVF   8/2018 - poor VF test taker    First photos   6/2018   Treatment / Drops started   Lat ou - started 6/15/2018            Family history    ?        Glaucoma meds    Latanoprost ou - started 6/15/2018         H/O adverse rxn to glaucoma drops    none        LASERS    Laser PI's 4/2018 - od // OS 5/2018         GLAUCOMA SURGERIES    none        OTHER EYE SURGERIES    Phaco/IOL os - 12/16/2020        CDR    0.7/0.4         Tbase    19-27  od // 22-40 os         Tmax     27/40          Ttarget    ?             HVF    3 test 2018 to  2021 - unreliable  od // unreliable  Os (improved ou)         Gonio    +3 w/ steep approach - post PI's ou         CCT    518 / 528        OCT    3 test 2018 to 2022 - RNFL - Full od // dec G/TS os         HRT    2 test 2018 to 2019 - MR - high std dev. od // high std dev os        Disc photos    6/2018     - Ttoday    19//19   -Test today -IOP and gonio     2. NS OD      (also  right eye has a HRVO so ? potential    ON FLOWMAX    Had a lot of corneal surface changes and slow recovery of vision post CE os - eventually did well     3. Diabetes    No BDR seen on exam 6/15/2018     4. H/O tumor / cancer RLL    S/P removal and re-construction at [facility]    The biopsy was done by amanda - but the surgery was done by some one else     5. Has some abdominal issues    Having surgery next week - 3/15/2018     6. Episopde of blurry vision ou    At [facility] 2018 - resolved    7. Hemiretinal vein occulusion (superior)  with macular edema, right eye vs sup BRVO w/   No NVI   On ASA 81 mg    Following with Mazzulla    - has seen Mazzulla and no CME at that time, to f/u in 3 months    8. PC IOL os - complex - shallow AC / floppy iris / xiomy ring - PCB00 23.5- 12/16/2020   Had slow visual recovery - got a lot of corneal surface changes    Eventually recovered to 20/40         PLAN   For MMG  IOP up od    - CPM with latanoprost qHS OU  - change combigan from OS to OU     -  IV injection OD with Mazzulla -- 12/22/2020 - for BRVO w/ ME     Ok to use +2.75 readers     IOP ok with present drops  BCVA os is 20/40 - does best after AT's given     F/U 3-4 months // gonio

## 2022-07-12 ENCOUNTER — OFFICE VISIT (OUTPATIENT)
Dept: OPHTHALMOLOGY | Facility: CLINIC | Age: 87
End: 2022-07-12
Payer: MEDICARE

## 2022-07-12 DIAGNOSIS — Z98.890 S/P LASER IRIDOTOMY: ICD-10-CM

## 2022-07-12 DIAGNOSIS — H25.11 NUCLEAR SCLEROTIC CATARACT OF RIGHT EYE: ICD-10-CM

## 2022-07-12 DIAGNOSIS — H40.023 OPEN ANGLE WITH BORDERLINE FINDINGS AND HIGH GLAUCOMA RISK IN BOTH EYES: ICD-10-CM

## 2022-07-12 DIAGNOSIS — H35.033 HYPERTENSIVE RETINOPATHY, BILATERAL: ICD-10-CM

## 2022-07-12 DIAGNOSIS — Z96.1 PSEUDOPHAKIA, LEFT EYE: ICD-10-CM

## 2022-07-12 DIAGNOSIS — H34.8110 HEMISPHERIC RETINAL VEIN OCCLUSION WITH MACULAR EDEMA OF RIGHT EYE: ICD-10-CM

## 2022-07-12 DIAGNOSIS — H40.89 GLAUCOMA DUE TO COMBINATION OF MECHANISMS: Primary | ICD-10-CM

## 2022-07-12 DIAGNOSIS — H40.042 STEROID RESPONDER, LEFT EYE: ICD-10-CM

## 2022-07-12 DIAGNOSIS — H57.03 SMALL PUPILS: ICD-10-CM

## 2022-07-12 DIAGNOSIS — H40.053 OCULAR HYPERTENSION, BILATERAL: ICD-10-CM

## 2022-07-12 DIAGNOSIS — H40.033 ANATOMICAL NARROW ANGLE BORDERLINE GLAUCOMA OF BOTH EYES: ICD-10-CM

## 2022-07-12 DIAGNOSIS — H40.153: ICD-10-CM

## 2022-07-12 PROCEDURE — 99999 PR PBB SHADOW E&M-EST. PATIENT-LVL III: ICD-10-PCS | Mod: PBBFAC,,, | Performed by: OPHTHALMOLOGY

## 2022-07-12 PROCEDURE — 99213 OFFICE O/P EST LOW 20 MIN: CPT | Mod: PBBFAC | Performed by: OPHTHALMOLOGY

## 2022-07-12 PROCEDURE — 92012 PR EYE EXAM, EST PATIENT,INTERMED: ICD-10-PCS | Mod: S$PBB,,, | Performed by: OPHTHALMOLOGY

## 2022-07-12 PROCEDURE — 99999 PR PBB SHADOW E&M-EST. PATIENT-LVL III: CPT | Mod: PBBFAC,,, | Performed by: OPHTHALMOLOGY

## 2022-07-12 PROCEDURE — 92012 INTRM OPH EXAM EST PATIENT: CPT | Mod: S$PBB,,, | Performed by: OPHTHALMOLOGY

## 2022-07-12 RX ORDER — LATANOPROST 50 UG/ML
1 SOLUTION/ DROPS OPHTHALMIC DAILY
Qty: 7.5 ML | Refills: 3 | Status: SHIPPED | OUTPATIENT
Start: 2022-07-12 | End: 2022-11-15

## 2022-07-12 RX ORDER — KETOCONAZOLE 20 MG/ML
1 SHAMPOO, SUSPENSION TOPICAL
COMMUNITY
Start: 2022-07-11

## 2022-07-12 RX ORDER — HYDROCORTISONE 25 MG/G
CREAM TOPICAL
COMMUNITY
Start: 2022-07-11

## 2022-07-21 ENCOUNTER — TELEPHONE (OUTPATIENT)
Dept: INTERNAL MEDICINE | Facility: CLINIC | Age: 87
End: 2022-07-21
Payer: MEDICARE

## 2022-07-21 DIAGNOSIS — M54.9 BACK PAIN, UNSPECIFIED BACK LOCATION, UNSPECIFIED BACK PAIN LATERALITY, UNSPECIFIED CHRONICITY: ICD-10-CM

## 2022-07-21 NOTE — TELEPHONE ENCOUNTER
Care Due:                  Date            Visit Type   Department     Provider  --------------------------------------------------------------------------------                                EP -                              PRIMARY      [facility] INTERNAL  Last Visit: 03-      CARE ([facility])   TY KERN                              EP -                              PRIMARY      [facility] INTERNAL  Next Visit: 09-      CARE ([facility])   TY KERN                                                            Last  Test          Frequency    Reason                     Performed    Due Date  --------------------------------------------------------------------------------    CMP.........  12 months..  pravastatin..............  07- 07-    Lipid Panel.  12 months..  pravastatin..............  07- 07-    [employer] Embedded Care Gaps. Reference number: [id number]. 7/21/2022   3:37:22 PM CDT

## 2022-07-21 NOTE — TELEPHONE ENCOUNTER
----- Message from Carine Kingston sent at 7/21/2022 11:28 AM CDT -----  Contact: [phone]  Requesting an RX refill or new RX.  Is this a refill or new RX: refill 1  RX name and strength (copy/paste from chart):  HYDROcodone-acetaminophen (NORCO) 5-325 mg per tablet  Is this a 30 day or 90 day RX:   Pharmacy name and phone # (copy/paste from chart): [facility]/pharmacy #[id number] - [place] - [address]   Phone:  [phone]  Fax:  [phone]         The doctors have asked that we provide their patients with the following 2 reminders -- prescription refills can take up to 72 hours, and a friendly reminder that in the future you can use your [facility] account to request refills:

## 2022-07-21 NOTE — TELEPHONE ENCOUNTER
----- Message from Chadwick Contreras sent at 7/21/2022  3:47 PM CDT -----  Contact: Lida(Wife) @ [phone]  Patient is returning a phone call.    Who left a message for the patient: Not sure       Does patient know what this is regarding:  No     Would you like a call back, or a response through your [facility] portal?:   Call     Comments:  Wife stated she missed a call but not sure if it's regarding patient medication. Please advise.

## 2022-07-22 RX ORDER — HYDROCODONE BITARTRATE AND ACETAMINOPHEN 5; 325 MG/1; MG/1
1 TABLET ORAL EVERY 6 HOURS PRN
Qty: 60 TABLET | Refills: 0 | Status: SHIPPED | OUTPATIENT
Start: 2022-07-22 | End: 2022-08-22 | Stop reason: SDUPTHER

## 2022-08-08 DIAGNOSIS — I10 ESSENTIAL HYPERTENSION: ICD-10-CM

## 2022-08-08 RX ORDER — LISINOPRIL 20 MG/1
20 TABLET ORAL DAILY
Qty: 90 TABLET | Refills: 1 | OUTPATIENT
Start: 2022-08-08

## 2022-08-08 NOTE — TELEPHONE ENCOUNTER
----- Message from Kimberley Samson sent at 8/8/2022 10:11 AM CDT -----  Contact: Wife/Lida/[phone]  Requesting an RX refill or new RX.  Is this a refill or new RX: New  RX name and strength :  lisinopriL (PRINIVIL,ZESTRIL) 20 MG tablet  Is this a 30 day or 90 day RX: 90  [facility]/pharmacy #[id number] - [place] - [address]  [address] [address]  Phone: [phone] Fax: [phone]        The doctors have asked that we provide their patients with the following 2 reminders -- prescription refills can take up to 72 hours, and a friendly reminder that in the future you can use your [facility] account to request refills: Yes      Pt is leaving to go out of town on 8/12 and is asking if the medication can be refilled before then

## 2022-08-08 NOTE — TELEPHONE ENCOUNTER
No new care gaps identified.  [facility] Embedded Care Gaps. Reference number: [id number]. 8/08/2022   11:14:32 AM ANNETTET

## 2022-08-09 NOTE — TELEPHONE ENCOUNTER
Refill Decision Note   Yogesh Laurita  is requesting a refill authorization.  Brief Assessment and Rationale for Refill:  Quick Discontinue     Medication Therapy Plan:       Medication Reconciliation Completed: No   Comments:     No Care Gaps recommended.     Note composed:7:46 PM 08/08/2022

## 2022-08-22 DIAGNOSIS — M54.9 BACK PAIN, UNSPECIFIED BACK LOCATION, UNSPECIFIED BACK PAIN LATERALITY, UNSPECIFIED CHRONICITY: ICD-10-CM

## 2022-08-22 RX ORDER — HYDROCODONE BITARTRATE AND ACETAMINOPHEN 5; 325 MG/1; MG/1
1 TABLET ORAL EVERY 6 HOURS PRN
Qty: 60 TABLET | Refills: 0 | Status: SHIPPED | OUTPATIENT
Start: 2022-08-22 | End: 2022-09-23 | Stop reason: SDUPTHER

## 2022-08-22 NOTE — TELEPHONE ENCOUNTER
No new care gaps identified.  [facility] Embedded Care Gaps. Reference number: [id number]. 8/22/2022   10:06:29 AM ANNETTET

## 2022-08-22 NOTE — TELEPHONE ENCOUNTER
----- Message from Michaelbill Manfred sent at 8/22/2022  9:32 AM CDT -----  Contact: self [phone]  Requesting an RX refill or new RX.  Is this a refill or new RX: refill  RX name and strength (copy/paste from chart):  HYDROcodone-acetaminophen (NORCO) 5-325 mg per tablet  Is this a 30 day or 90 day RX:   Pharmacy name and phone # (copy/paste from chart):    [facility]/pharmacy #[id number] - [place] - [address]  [address] [address]  Phone: [phone] Fax: [phone]    The doctors have asked that we provide their patients with the following 2 reminders -- prescription refills can take up to 72 hours, and a friendly reminder that in the future you can use your [facility] account to request refills: yes    Please call and advise

## 2022-08-30 ENCOUNTER — LAB VISIT (OUTPATIENT)
Dept: LAB | Facility: HOSPITAL | Age: 87
End: 2022-08-30
Attending: INTERNAL MEDICINE
Payer: MEDICARE

## 2022-08-30 DIAGNOSIS — E11.42 CONTROLLED TYPE 2 DIABETES MELLITUS WITH DIABETIC POLYNEUROPATHY, WITHOUT LONG-TERM CURRENT USE OF INSULIN: ICD-10-CM

## 2022-08-30 LAB
ANION GAP SERPL CALC-SCNC: 8 MMOL/L (ref 8–16)
BUN SERPL-MCNC: 25 MG/DL (ref 8–23)
CALCIUM SERPL-MCNC: 9.6 MG/DL (ref 8.7–10.5)
CHLORIDE SERPL-SCNC: 100 MMOL/L (ref 95–110)
CO2 SERPL-SCNC: 25 MMOL/L (ref 23–29)
CREAT SERPL-MCNC: 1.1 MG/DL (ref 0.5–1.4)
EST. GFR  (NO RACE VARIABLE): >60 ML/MIN/1.73 M^2
ESTIMATED AVG GLUCOSE: 111 MG/DL (ref 68–131)
GLUCOSE SERPL-MCNC: 76 MG/DL (ref 70–110)
HBA1C MFR BLD: 5.5 % (ref 4–5.6)
POTASSIUM SERPL-SCNC: 4.2 MMOL/L (ref 3.5–5.1)
SODIUM SERPL-SCNC: 133 MMOL/L (ref 136–145)

## 2022-08-30 PROCEDURE — 36415 COLL VENOUS BLD VENIPUNCTURE: CPT | Mod: PO | Performed by: INTERNAL MEDICINE

## 2022-08-30 PROCEDURE — 80048 BASIC METABOLIC PNL TOTAL CA: CPT | Performed by: INTERNAL MEDICINE

## 2022-08-30 PROCEDURE — 83036 HEMOGLOBIN GLYCOSYLATED A1C: CPT | Performed by: INTERNAL MEDICINE

## 2022-09-07 ENCOUNTER — OFFICE VISIT (OUTPATIENT)
Dept: INTERNAL MEDICINE | Facility: CLINIC | Age: 87
End: 2022-09-07
Payer: MEDICARE

## 2022-09-07 VITALS
SYSTOLIC BLOOD PRESSURE: 130 MMHG | HEIGHT: 66 IN | DIASTOLIC BLOOD PRESSURE: 84 MMHG | HEART RATE: 74 BPM | BODY MASS INDEX: 22.66 KG/M2 | WEIGHT: 141 LBS

## 2022-09-07 DIAGNOSIS — E11.42 DIABETIC POLYNEUROPATHY ASSOCIATED WITH TYPE 2 DIABETES MELLITUS: Primary | ICD-10-CM

## 2022-09-07 DIAGNOSIS — E11.59 HYPERTENSION ASSOCIATED WITH DIABETES: ICD-10-CM

## 2022-09-07 DIAGNOSIS — M54.6 CHRONIC MIDLINE THORACIC BACK PAIN: ICD-10-CM

## 2022-09-07 DIAGNOSIS — I15.2 HYPERTENSION ASSOCIATED WITH DIABETES: ICD-10-CM

## 2022-09-07 DIAGNOSIS — G89.29 CHRONIC MIDLINE THORACIC BACK PAIN: ICD-10-CM

## 2022-09-07 PROCEDURE — 99999 PR PBB SHADOW E&M-EST. PATIENT-LVL IV: ICD-10-PCS | Mod: PBBFAC,,, | Performed by: INTERNAL MEDICINE

## 2022-09-07 PROCEDURE — 99999 PR PBB SHADOW E&M-EST. PATIENT-LVL IV: CPT | Mod: PBBFAC,,, | Performed by: INTERNAL MEDICINE

## 2022-09-07 PROCEDURE — 99214 OFFICE O/P EST MOD 30 MIN: CPT | Mod: PBBFAC | Performed by: INTERNAL MEDICINE

## 2022-09-07 PROCEDURE — 99213 PR OFFICE/OUTPT VISIT, EST, LEVL III, 20-29 MIN: ICD-10-PCS | Mod: S$PBB,,, | Performed by: INTERNAL MEDICINE

## 2022-09-07 PROCEDURE — 99213 OFFICE O/P EST LOW 20 MIN: CPT | Mod: S$PBB,,, | Performed by: INTERNAL MEDICINE

## 2022-09-07 NOTE — PROGRESS NOTES
Subjective:       Patient ID: Yogesh Shay is a 88 y.o. male.    Chief Complaint: Follow-up and Diabetes    Diabetes  He presents for his follow-up diabetic visit. He has type 2 diabetes mellitus. His disease course has been stable. There are no hypoglycemic associated symptoms. Pertinent negatives for hypoglycemia include no speech difficulty. There are no diabetic associated symptoms. Pertinent negatives for diabetes include no chest pain, no fatigue and no weakness. Symptoms are stable.   Review of Systems   Constitutional:  Negative for fatigue.   HENT:  Negative for sore throat.    Eyes:  Negative for visual disturbance.   Respiratory:  Negative for shortness of breath.    Cardiovascular:  Negative for chest pain and palpitations.   Gastrointestinal:  Negative for abdominal pain.   Genitourinary:  Negative for dysuria, frequency and hematuria.   Musculoskeletal:  Positive for back pain (chronic). Negative for joint swelling.   Skin:  Negative for rash.   Neurological:  Negative for speech difficulty and weakness.   Psychiatric/Behavioral:  Negative for dysphoric mood.      Objective:      Physical Exam  Vitals reviewed.   Constitutional:       General: He is not in acute distress.     Appearance: He is well-developed.   HENT:      Head: Normocephalic and atraumatic.   Eyes:      Conjunctiva/sclera: Conjunctivae normal.      Pupils: Pupils are equal, round, and reactive to light.   Cardiovascular:      Rate and Rhythm: Normal rate and regular rhythm.      Heart sounds: Normal heart sounds.   Pulmonary:      Effort: Pulmonary effort is normal.      Breath sounds: Normal breath sounds. No wheezing.   Abdominal:      General: Bowel sounds are normal.      Palpations: Abdomen is soft.      Tenderness: There is no abdominal tenderness.   Musculoskeletal:         General: No tenderness. Normal range of motion.      Cervical back: Normal range of motion and neck supple.   Skin:     Findings: No erythema.    Neurological:      Mental Status: He is alert and oriented to person, place, and time.      Cranial Nerves: No cranial nerve deficit.       Assessment:       1. Diabetic polyneuropathy associated with type 2 diabetes mellitus    2. Hypertension associated with diabetes    3. Chronic midline thoracic back pain          Plan:       Yogesh was seen today for follow-up and diabetes.    Diagnoses and all orders for this visit:    Diabetic polyneuropathy associated with type 2 diabetes mellitus  Comments:  diabetes well controlled-  will elim Metformin  Orders:  -     Basic Metabolic Panel; Future  -     Hemoglobin A1C; Future    Hypertension associated with diabetes    Chronic midline thoracic back pain      Follow up in about 4 months (around 1/7/2023) for F/U APPOINTMENT WITH ME, WITH LAB BEFORE.

## 2022-09-23 DIAGNOSIS — M54.9 BACK PAIN, UNSPECIFIED BACK LOCATION, UNSPECIFIED BACK PAIN LATERALITY, UNSPECIFIED CHRONICITY: ICD-10-CM

## 2022-09-23 NOTE — TELEPHONE ENCOUNTER
----- Message from Lolly Cline sent at 9/23/2022  2:21 PM CDT -----  Contact: [phone] @Lida  Requesting an RX refill or new RX.  Is this a refill or new RX:   RX name and strength HYDROcodone-acetaminophen (NORCO) 5-325 mg per tablet       Is this a 30 day or 90 day RX:   Ph[facility] phone [id number] - [place], [place] - [address]  The doctors have asked that we provide their patients with the following 2 reminders -- prescription refills can take up to 72 hours, and a friendly reminder that in the future you can use your [facility] account to request refills: yes

## 2022-09-23 NOTE — TELEPHONE ENCOUNTER
Care Due:                  Date            Visit Type   Department     Provider  --------------------------------------------------------------------------------                                EP -                              PRIMARY      [facility]  Last Visit: 09-      CARE ([facility])   MEDICINE       Aubrey KERN  Next Visit: None Scheduled  None         None Found                                                            Last  Test          Frequency    Reason                     Performed    Due Date  --------------------------------------------------------------------------------    CMP.........  12 months..  pravastatin..............  07- 07-    Lipid Panel.  12 months..  pravastatin..............  07- 07-    [employer] Embedded Care Gaps. Reference number: [id number]. 9/23/2022   4:26:18 PM CDT

## 2022-09-28 RX ORDER — HYDROCODONE BITARTRATE AND ACETAMINOPHEN 5; 325 MG/1; MG/1
1 TABLET ORAL EVERY 6 HOURS PRN
Qty: 60 TABLET | Refills: 0 | Status: SHIPPED | OUTPATIENT
Start: 2022-09-28 | End: 2022-10-21 | Stop reason: SDUPTHER

## 2022-10-14 ENCOUNTER — TELEPHONE (OUTPATIENT)
Dept: INTERNAL MEDICINE | Facility: CLINIC | Age: 87
End: 2022-10-14
Payer: MEDICARE

## 2022-10-14 NOTE — TELEPHONE ENCOUNTER
----- Message from Yesenia Haider sent at 10/14/2022  9:53 AM CDT -----  Contact: Zaida/Lida [phone]  Caller is requesting an earlier appointment then we can schedule.  Caller is requesting a message be sent to the provider.  When is the next available appointment with their provider:  none in January  Reason for the appointment:  4 Month Follow Up  Patient preference of timeframe to be scheduled:01/09, 01/10 or 01/12    Would the patient like a call back, or a response through their [facility] portal?:   call  Comments:      Please call and advise.    Thank You

## 2022-10-14 NOTE — TELEPHONE ENCOUNTER
Called and spoke to pt's wife   Advised her that his schd is not opened yet for the month of January   Offered her a visit with the NP but she declined only wanted to see Dr Root

## 2022-10-21 DIAGNOSIS — M54.9 BACK PAIN, UNSPECIFIED BACK LOCATION, UNSPECIFIED BACK PAIN LATERALITY, UNSPECIFIED CHRONICITY: ICD-10-CM

## 2022-10-21 RX ORDER — HYDROCODONE BITARTRATE AND ACETAMINOPHEN 5; 325 MG/1; MG/1
1 TABLET ORAL EVERY 6 HOURS PRN
Qty: 60 TABLET | Refills: 0 | Status: SHIPPED | OUTPATIENT
Start: 2022-10-21 | End: 2022-11-21 | Stop reason: SDUPTHER

## 2022-10-21 NOTE — TELEPHONE ENCOUNTER
----- Message from Lolly Cline sent at 10/21/2022  9:36 AM CDT -----  Contact: Ana @ [phone]  Requesting an RX refill or new RX.  Is this a refill or new RX:   RX name and strength HYDROcodone-acetaminophen (NORCO) 5-325 mg per tablet      Is this a 30 day or 90 day RX:   [id number] [place], [place] - [address]  The doctors have asked that we provide their patients with the following 2 reminders -- prescription refills can take up to 72 hours, and a friendly reminder that in the future you can use your [facility] account to request refills: yes

## 2022-10-21 NOTE — TELEPHONE ENCOUNTER
No new care gaps identified.  [facility] Embedded Care Gaps. Reference number: [id number]. 10/21/2022   9:48:08 AM ANNETTET

## 2022-11-14 NOTE — PROGRESS NOTES
HPI     Glaucoma            Comments: 4 month ck and pt states no changes since last exam           Comments    DLS: 7/12/22    1. MMG   2. Hx Narrow Angles OU   3. NS OD  4. Type 2 DM no BDR   5. Hx Tumor/Cancer RLL   6. PCIOL OS  7. 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            Assessment /Plan     For exam results, see Encounter Report.    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    Residual stage of open-angle glaucoma, mild stage, bilateral    Steroid responder, left eye    Ocular hypertension, bilateral    Hemispheric retinal vein occlusion with macular edema of right eye    Hypertensive retinopathy, bilateral    Nuclear sclerotic cataract of right eye    Pseudophakia, left eye    S/P laser iridotomy    Small pupils      RETIRED FROM THE [employer] // STILL HAS A [place] THAT HE FARMS   VERY ACTIVE STILL          Glaucoma (type and duration)    Narrow angles - S/P PI's ou // + residual open angle glaucoma -   First HVF   8/2018 - poor VF test taker    First photos   6/2018   Treatment / Drops started   Lat ou - started 6/15/2018            Family history    ?        Glaucoma meds    Latanoprost ou - started 6/15/2018         H/O adverse rxn to glaucoma drops    none        LASERS    Laser PI's 4/2018 - od // OS 5/2018         GLAUCOMA SURGERIES    none        OTHER EYE SURGERIES    Phaco/IOL os - 12/16/2020        CDR    0.7/0.4         Tbase    19-27  od // 22-40 os         Tmax     27/40          Ttarget    ?             HVF    3 test 2018 to  2021 - unreliable  od // unreliable  Os (improved ou)         Gonio    +3 w/ steep approach - post PI's ou         CCT    518 / 528        OCT    3 test 2018 to 2022 - RNFL - Full od // dec G/TS os         HRT    2 test 2018 to 2019 - MR - high std dev. od // high std dev os        Disc photos    6/2018     - Ttoday    25//24 (IOP creeping up -  reports good compliance with drops)   -Test today - iop gonio     2. NS OD      (also right eye has a HRVO so ? potential    ON FLOWMAX    Had a lot of corneal surface changes and slow recovery of vision post CE os - eventually did well     3. Diabetes    No BDR seen on exam 6/15/2018     4. H/O tumor / cancer RLL    S/P removal and re-construction at [facility]    The biopsy was done by amanda - but the surgery was done by some one else     5. Has some abdominal issues    Having surgery next week - 3/15/2018     6. Episopde of blurry vision ou    At [facility] 2018 - resolved    7. Hemiretinal vein occulusion (superior)  with macular edema, right eye vs sup BRVO w/   No NVI   On ASA 81 mg    Following with Mazzulla    - has seen Mazzulla and no CME at that time, to f/u in 3 months    8. PC IOL os - complex - shallow AC / floppy iris / xiomy ring - PCB00 23.5- 12/16/2020   Had slow visual recovery - got a lot of corneal surface changes    Eventually recovered to 20/40         PLAN   For MMG  IOP up od    - CPM with latanoprost qHS OU  - change combigan from OS to OU     -  IV injection OD with Mazzulla -- 12/22/2020 - for BRVO w/ ME     Ok to use +2.75 readers     IOP ok with present drops  BCVA os is 20/40 - does best after AT's given     11/15/2022  - endorses compliance w latanoprost and combigan   - IOP 25//24   - trail of rocklatan ou - if cost too much - like $200 dollars can do dorzolamide instead // cost should be about $50- a month - 1 sample given   F/U 2 - 3 months IOP check w/ change in drops

## 2022-11-15 ENCOUNTER — OFFICE VISIT (OUTPATIENT)
Dept: OPHTHALMOLOGY | Facility: CLINIC | Age: 87
End: 2022-11-15
Payer: MEDICARE

## 2022-11-15 DIAGNOSIS — Z98.890 S/P LASER IRIDOTOMY: ICD-10-CM

## 2022-11-15 DIAGNOSIS — H40.89 GLAUCOMA DUE TO COMBINATION OF MECHANISMS: Primary | ICD-10-CM

## 2022-11-15 DIAGNOSIS — H40.042 STEROID RESPONDER, LEFT EYE: ICD-10-CM

## 2022-11-15 DIAGNOSIS — H40.023 OPEN ANGLE WITH BORDERLINE FINDINGS AND HIGH GLAUCOMA RISK IN BOTH EYES: ICD-10-CM

## 2022-11-15 DIAGNOSIS — H35.033 HYPERTENSIVE RETINOPATHY, BILATERAL: ICD-10-CM

## 2022-11-15 DIAGNOSIS — H34.8110 HEMISPHERIC RETINAL VEIN OCCLUSION WITH MACULAR EDEMA OF RIGHT EYE: ICD-10-CM

## 2022-11-15 DIAGNOSIS — H40.033 ANATOMICAL NARROW ANGLE BORDERLINE GLAUCOMA OF BOTH EYES: ICD-10-CM

## 2022-11-15 DIAGNOSIS — H40.053 OCULAR HYPERTENSION, BILATERAL: ICD-10-CM

## 2022-11-15 DIAGNOSIS — Z96.1 PSEUDOPHAKIA, LEFT EYE: ICD-10-CM

## 2022-11-15 DIAGNOSIS — H40.153: ICD-10-CM

## 2022-11-15 DIAGNOSIS — H57.03 SMALL PUPILS: ICD-10-CM

## 2022-11-15 DIAGNOSIS — H25.11 NUCLEAR SCLEROTIC CATARACT OF RIGHT EYE: ICD-10-CM

## 2022-11-15 PROCEDURE — 99999 PR PBB SHADOW E&M-EST. PATIENT-LVL III: CPT | Mod: PBBFAC,,, | Performed by: OPHTHALMOLOGY

## 2022-11-15 PROCEDURE — 92012 INTRM OPH EXAM EST PATIENT: CPT | Mod: S$PBB,,, | Performed by: OPHTHALMOLOGY

## 2022-11-15 PROCEDURE — 99999 PR PBB SHADOW E&M-EST. PATIENT-LVL III: ICD-10-PCS | Mod: PBBFAC,,, | Performed by: OPHTHALMOLOGY

## 2022-11-15 PROCEDURE — 92020 GONIOSCOPY: CPT | Mod: PBBFAC | Performed by: OPHTHALMOLOGY

## 2022-11-15 PROCEDURE — 92012 PR EYE EXAM, EST PATIENT,INTERMED: ICD-10-PCS | Mod: S$PBB,,, | Performed by: OPHTHALMOLOGY

## 2022-11-15 PROCEDURE — 92020 GONIOSCOPY: CPT | Mod: S$PBB,,, | Performed by: OPHTHALMOLOGY

## 2022-11-15 PROCEDURE — 92020 PR SPECIAL EYE EVAL,GONIOSCOPY: ICD-10-PCS | Mod: S$PBB,,, | Performed by: OPHTHALMOLOGY

## 2022-11-15 PROCEDURE — 99213 OFFICE O/P EST LOW 20 MIN: CPT | Mod: PBBFAC | Performed by: OPHTHALMOLOGY

## 2022-11-15 RX ORDER — NETARSUDIL AND LATANOPROST OPHTHALMIC SOLUTION, 0.02%/0.005% .2; .05 MG/ML; MG/ML
1 SOLUTION/ DROPS OPHTHALMIC; TOPICAL NIGHTLY
Qty: 2.5 ML | Refills: 12 | Status: SHIPPED | OUTPATIENT
Start: 2022-11-15 | End: 2022-11-22

## 2022-11-15 RX ORDER — BRIMONIDINE TARTRATE AND TIMOLOL MALEATE 2; 5 MG/ML; MG/ML
1 SOLUTION OPHTHALMIC 2 TIMES DAILY
Qty: 15 ML | Refills: 3 | Status: SHIPPED | OUTPATIENT
Start: 2022-11-15 | End: 2023-03-21 | Stop reason: SDUPTHER

## 2022-11-15 RX ORDER — LATANOPROST 50 UG/ML
1 SOLUTION/ DROPS OPHTHALMIC DAILY
Qty: 7.5 ML | Refills: 3 | Status: CANCELLED | OUTPATIENT
Start: 2022-11-15

## 2022-11-21 ENCOUNTER — TELEPHONE (OUTPATIENT)
Dept: INTERNAL MEDICINE | Facility: CLINIC | Age: 87
End: 2022-11-21

## 2022-11-21 ENCOUNTER — TELEPHONE (OUTPATIENT)
Dept: INTERNAL MEDICINE | Facility: CLINIC | Age: 87
End: 2022-11-21
Payer: MEDICARE

## 2022-11-21 DIAGNOSIS — M54.9 BACK PAIN, UNSPECIFIED BACK LOCATION, UNSPECIFIED BACK PAIN LATERALITY, UNSPECIFIED CHRONICITY: ICD-10-CM

## 2022-11-21 RX ORDER — HYDROCODONE BITARTRATE AND ACETAMINOPHEN 5; 325 MG/1; MG/1
1 TABLET ORAL EVERY 6 HOURS PRN
Qty: 60 TABLET | Refills: 0 | Status: SHIPPED | OUTPATIENT
Start: 2022-11-21

## 2022-11-21 NOTE — TELEPHONE ENCOUNTER
----- Message from Yesenia Haider sent at 11/21/2022  9:38 AM CST -----  Contact: Lida/Spouse [phone]  Requesting an RX refill or new RX.  Is this a refill or new RX: Refill  RX name and strength: HYDROcodone-acetaminophen (NORCO) 5-325 mg per tablet  Is this a 30 day or 90 day RX: 30 day   Patient advised that in the future they can use their [facility] account to request a refill?:  n/a  Pharmacy name and phone #:   [facility]/pharmacy #[id number]  [place] - [address]  [address] [place] [address]  Phone: [phone] Fax: [phone]    Comments:     Thank You

## 2022-12-21 ENCOUNTER — TELEPHONE (OUTPATIENT)
Dept: OPHTHALMOLOGY | Facility: CLINIC | Age: 87
End: 2022-12-21
Payer: MEDICARE

## 2022-12-21 ENCOUNTER — TELEPHONE (OUTPATIENT)
Dept: INTERNAL MEDICINE | Facility: CLINIC | Age: 87
End: 2022-12-21
Payer: MEDICARE

## 2022-12-21 NOTE — TELEPHONE ENCOUNTER
----- Message from Carrie S Jose Miguel sent at 12/21/2022  9:59 AM CST -----  Contact: Pt Mobile [phone] Pts wife Mrs. Shay called  Requesting an RX refill or new RX.  Is this a refill or new RX: Refill  RX name and strength HYDROcodone-acetaminophen (NORCO) 5-325 mg per tablet  Is this a 30 day or 90 day RX: 30  Pharmacy name and phone #   [facility] #[id number] - [place]  [address]  [address] [place] [address]  Phone: [phone] Fax: [phone]  The doctors have asked that we provide their patients with the following 2 reminders -- prescription refills can take up to 72 hours, and a friendly reminder that in the future you can use your [facility] account to request refills:    Comments: Patients wife would like for you to give her a call when the script is filled please.

## 2022-12-21 NOTE — TELEPHONE ENCOUNTER
Called and spoke to pt ulices appt for him to see a dr for the [place] refill   Pt sees Dr Root and need a refill

## 2022-12-21 NOTE — TELEPHONE ENCOUNTER
----- Message from Bravo Otero sent at 12/21/2022 10:15 AM CST -----  Contact: Lida [phone]  Pt wife is calling because her  has blood in his eye and they aren't sure what they soul do or if they need to see someone. Please call back to further assist.

## 2022-12-21 NOTE — TELEPHONE ENCOUNTER
Spoke w/patient wife in regards to blood in eye. Advised patient's wife it sounds like subconjunctival hem. Usually its looks worse before it gets better. No pain or flashes or floaters. Advised patient's wife(Lida) if any thing change come to [facility]. Pt's wife understood. loi

## 2022-12-21 NOTE — TELEPHONE ENCOUNTER
----- Message from Bravo Otero sent at 12/21/2022 10:15 AM CST -----  Contact: Lida [phone]  Pt wife is calling because her  has blood in his eye and they aren't sure what they soul do or if they need to see someone. Please call back to further assist.

## 2022-12-27 ENCOUNTER — DOCUMENTATION ONLY (OUTPATIENT)
Dept: INTERNAL MEDICINE | Facility: CLINIC | Age: 87
End: 2022-12-27

## 2023-03-20 NOTE — PROGRESS NOTES
HPI    DLS: 11/15/2022    Pt here for 4 Month Check;  Pt states vision seems to be dim but his health hasn't been the greatest   for a month now.     Meds;  Combigan BID OU  Dorzolamide QDAY OU  AT's PRN OU    1. MMG   2. Hx Narrow Angles OU   3. NS OD   4. Type 2 DM no BDR   5. Hx Tumor/Cancer RLL   6. PCIOL OS   7. 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            Assessment /Plan     For exam results, see Encounter Report.    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    Residual stage of open-angle glaucoma, mild stage, bilateral    Steroid responder, left eye    Ocular hypertension, bilateral    Hemispheric retinal vein occlusion with macular edema of right eye    Hypertensive retinopathy, bilateral    Nuclear sclerotic cataract of right eye    Pseudophakia, left eye    S/P laser iridotomy    Small pupils        RETIRED FROM THE [employer] // STILL HAS A [place] THAT HE FARMS   VERY ACTIVE STILL          Glaucoma (type and duration)    Narrow angles - S/P PI's ou // + residual open angle glaucoma -   First HVF   8/2018 - poor VF test taker    First photos   6/2018   Treatment / Drops started   Lat ou - started 6/15/2018            Family history    ?        Glaucoma meds    Latanoprost ou - started 6/15/2018         H/O adverse rxn to glaucoma drops    none        LASERS    Laser PI's 4/2018 - od // OS 5/2018         GLAUCOMA SURGERIES    none        OTHER EYE SURGERIES    Phaco/IOL os - 12/16/2020        CDR    0.7/0.4         Tbase    19-27  od // 22-40 os         Tmax     27/40          Ttarget    ?             HVF    3 test 2018 to  2021 - unreliable  od // unreliable  Os (improved ou)         Gonio    +3 w/ steep approach - post PI's ou         CCT    518 / 528        OCT    3 test 2018 to 2022 - RNFL - Full od // dec G/TS os         HRT    2 test 2018 to 2019 - MR - high std dev. od // high std dev os        Disc photos    6/2018     - Ttoday    17/15 (IOP  creeping up - reports good compliance with drops)   -Test today - iop     2. NS OD      (also right eye has a HRVO so ? potential    ON FLOWMAX    Had a lot of corneal surface changes and slow recovery of vision post CE os - eventually did well     3. Diabetes    No BDR seen on exam 6/15/2018     4. H/O tumor / cancer RLL    S/P removal and re-construction at [facility]    The biopsy was done by amanda - but the surgery was done by some one else     5. Has some abdominal issues    Having surgery next week - 3/15/2018     6. Episopde of blurry vision ou    At [facility] 2018 - resolved    7. Hemiretinal vein occulusion (superior)  with macular edema, right eye vs sup BRVO w/   No NVI   On ASA 81 mg    Following with Mazzulla    - has seen Mazzulla and no CME at that time, to f/u in 3 months    8. PC IOL os - complex - shallow AC / floppy iris / xiomy ring - PCB00 23.5- 12/16/2020   Had slow visual recovery - got a lot of corneal surface changes    Eventually recovered to 20/40         PLAN   For MMG  IOP up od    - CPM with latanoprost qHS OU  - change combigan from OS to OU     -  IV injection OD with Mazzulla -- 12/22/2020 - for BRVO w/ ME     Ok to use +2.75 readers     IOP ok with present drops  BCVA os is 20/40 - does best after AT's given     11/15/2022  - endorses compliance w latanoprost and combigan   - IOP 25//24   - trail of rocklatan ou - if cost too much - like $200 dollars can do dorzolamide instead // cost should be about $50- a month - 1 sample given     3/21/2023   Could not afford rocklatan  Adjust gtts   Latanoprost ou q hs   Combigna ou bid   Dorzolamide - bid ou  (written  for tid so can get more )     F/U  4 months - send a recall

## 2023-03-21 ENCOUNTER — OFFICE VISIT (OUTPATIENT)
Dept: OPHTHALMOLOGY | Facility: CLINIC | Age: 88
End: 2023-03-21
Payer: MEDICARE

## 2023-03-21 DIAGNOSIS — Z98.890 S/P LASER IRIDOTOMY: ICD-10-CM

## 2023-03-21 DIAGNOSIS — H34.8110 HEMISPHERIC RETINAL VEIN OCCLUSION WITH MACULAR EDEMA OF RIGHT EYE: ICD-10-CM

## 2023-03-21 DIAGNOSIS — H40.89 GLAUCOMA DUE TO COMBINATION OF MECHANISMS: Primary | ICD-10-CM

## 2023-03-21 DIAGNOSIS — H40.153: ICD-10-CM

## 2023-03-21 DIAGNOSIS — H57.03 SMALL PUPILS: ICD-10-CM

## 2023-03-21 DIAGNOSIS — H35.033 HYPERTENSIVE RETINOPATHY, BILATERAL: ICD-10-CM

## 2023-03-21 DIAGNOSIS — H25.11 NUCLEAR SCLEROTIC CATARACT OF RIGHT EYE: ICD-10-CM

## 2023-03-21 DIAGNOSIS — H40.042 STEROID RESPONDER, LEFT EYE: ICD-10-CM

## 2023-03-21 DIAGNOSIS — H40.053 OCULAR HYPERTENSION, BILATERAL: ICD-10-CM

## 2023-03-21 DIAGNOSIS — Z96.1 PSEUDOPHAKIA, LEFT EYE: ICD-10-CM

## 2023-03-21 PROCEDURE — 99214 PR OFFICE/OUTPT VISIT, EST, LEVL IV, 30-39 MIN: ICD-10-PCS | Mod: S$PBB,,, | Performed by: OPHTHALMOLOGY

## 2023-03-21 PROCEDURE — 99214 OFFICE O/P EST MOD 30 MIN: CPT | Mod: S$PBB,,, | Performed by: OPHTHALMOLOGY

## 2023-03-21 PROCEDURE — 99999 PR PBB SHADOW E&M-EST. PATIENT-LVL III: ICD-10-PCS | Mod: PBBFAC,,, | Performed by: OPHTHALMOLOGY

## 2023-03-21 PROCEDURE — 99213 OFFICE O/P EST LOW 20 MIN: CPT | Mod: PBBFAC | Performed by: OPHTHALMOLOGY

## 2023-03-21 PROCEDURE — 99999 PR PBB SHADOW E&M-EST. PATIENT-LVL III: CPT | Mod: PBBFAC,,, | Performed by: OPHTHALMOLOGY

## 2023-03-21 RX ORDER — LATANOPROST 50 UG/ML
1 SOLUTION/ DROPS OPHTHALMIC NIGHTLY
Qty: 7.5 ML | Refills: 3 | Status: SHIPPED | OUTPATIENT
Start: 2023-03-21 | End: 2023-05-04

## 2023-03-21 RX ORDER — DORZOLAMIDE HCL 20 MG/ML
1 SOLUTION/ DROPS OPHTHALMIC 3 TIMES DAILY
Qty: 30 ML | Refills: 3 | Status: SHIPPED | OUTPATIENT
Start: 2023-03-21 | End: 2023-12-11 | Stop reason: SDUPTHER

## 2023-03-21 RX ORDER — BRIMONIDINE TARTRATE AND TIMOLOL MALEATE 2; 5 MG/ML; MG/ML
1 SOLUTION OPHTHALMIC 2 TIMES DAILY
Qty: 15 ML | Refills: 3 | Status: SHIPPED | OUTPATIENT
Start: 2023-03-21 | End: 2023-09-28

## 2023-04-03 ENCOUNTER — EXTERNAL HOSPITAL ADMISSION (OUTPATIENT)
Dept: ADMINISTRATIVE | Facility: CLINIC | Age: 88
End: 2023-04-03
Payer: MEDICARE

## 2023-04-03 ENCOUNTER — PATIENT OUTREACH (OUTPATIENT)
Dept: ADMINISTRATIVE | Facility: CLINIC | Age: 88
End: 2023-04-03
Payer: MEDICARE

## 2023-04-03 NOTE — PROGRESS NOTES
C3 nurse attempted to contact Yogesh Shay for a TCC post hospital discharge follow up call. No answer. The patient does not have a scheduled HOSFU appointment, and the pt does not have an [facility] PCP.

## 2023-04-04 NOTE — PROGRESS NOTES
C3 nurse spoke to the patient, and was informed that he no longer goes to [facility] due to distance from his home, and his inability to drive.

## 2023-05-03 DIAGNOSIS — H40.89 GLAUCOMA DUE TO COMBINATION OF MECHANISMS: ICD-10-CM

## 2023-05-03 DIAGNOSIS — H40.153: ICD-10-CM

## 2023-05-04 RX ORDER — LATANOPROST 50 UG/ML
SOLUTION/ DROPS OPHTHALMIC
Qty: 10 ML | Refills: 2 | Status: SHIPPED | OUTPATIENT
Start: 2023-05-04 | End: 2023-08-25

## 2023-05-10 ENCOUNTER — TELEPHONE (OUTPATIENT)
Dept: OPHTHALMOLOGY | Facility: CLINIC | Age: 88
End: 2023-05-10
Payer: MEDICARE

## 2023-05-10 NOTE — TELEPHONE ENCOUNTER
"----- Message from Jenise Romero sent at 5/10/2023  2:47 PM CDT -----  Regarding: Appointment  "Type:  Patient Call Back    Who Called:Lida (Spouse)    What is the reqeust in detail:Requesting call back to schedule 4 month follow up appointment. Please advise    Can the clinic reply by [facility]?no    Best Call Back Number:[phone]      Additional Information:            "

## 2023-07-30 NOTE — PROGRESS NOTES
HPI     Glaucoma     Additional comments: 4 month ck and pt states no changes since last exam           Comments    DLS: 3/21/23    1. MMG   2. Hx Narrow Angles OU   3. NS OD  4. Type 2 DM no BDR   5. Hx Tumor/Cancer RLL   6. PCIOL OS  7. 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            Assessment /Plan     For exam results, see Encounter Report.    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    Residual stage of open-angle glaucoma, mild stage, bilateral    Steroid responder, left eye    Ocular hypertension, bilateral    Hemispheric retinal vein occlusion with macular edema of right eye    Hypertensive retinopathy, bilateral    Nuclear sclerotic cataract of right eye    Pseudophakia, left eye    Small pupils    S/P laser iridotomy          RETIRED FROM THE [employer] // STILL HAS A [place] THAT HE FARMS   VERY ACTIVE STILL          Glaucoma (type and duration)    Narrow angles - S/P PI's ou // + residual open angle glaucoma -   First HVF   8/2018 - poor VF test taker    First photos   6/2018   Treatment / Drops started   Lat ou - started 6/15/2018            Family history    ?        Glaucoma meds    Latanoprost ou - started 6/15/2018         H/O adverse rxn to glaucoma drops    none        LASERS    Laser PI's 4/2018 - od // OS 5/2018         GLAUCOMA SURGERIES    none        OTHER EYE SURGERIES    Phaco/IOL os - 12/16/2020        CDR    0.7/0.4         Tbase    19-27  od // 22-40 os         Tmax     27/40          Ttarget    ?             HVF    3 test 2018 to  2021 - unreliable  od // unreliable  Os (improved ou)         Gonio    +3 w/ steep approach - post PI's ou         CCT    518 / 528        OCT    3 test 2018 to 2022 - RNFL - Full od // dec G/TS os         HRT    2 test 2018 to 2019 - MR - high std dev. od // high std dev os        Disc photos    6/2018     - Ttoday   16/16   (IOP creeping up - reports good compliance with drops)   -Test today - iop     2. NS OD      (also right eye has a HRVO so ? potential    ON FLOWMAX    Had a lot of corneal surface changes and slow recovery of vision post CE os - eventually did well     3. Diabetes    No BDR seen on exam 6/15/2018     4. H/O tumor / cancer RLL    S/P removal and re-construction at [facility]    The biopsy was done by amanda - but the surgery was done by some one else     5. Has some abdominal issues    Having surgery next week - 3/15/2018     6. Episopde of blurry vision ou    At [facility] 2018 - resolved    7. Hemiretinal vein occulusion (superior)  with macular edema, right eye vs sup BRVO w/   No NVI   On ASA 81 mg    Following with Mazzulla    - has seen Mazzulla and no CME at that time, to f/u in 3 months    8. PC IOL os - complex - shallow AC / floppy iris / xiomy ring - PCB00 23.5- 12/16/2020   Had slow visual recovery - got a lot of corneal surface changes    Eventually recovered to 20/40         PLAN   For MMG  IOP up od    - CPM with latanoprost qHS OU  - change combigan from OS to OU     -  IV injection OD with Mazzulla -- 12/22/2020 - for BRVO w/ ME     Ok to use +2.75 readers     IOP ok with present drops  BCVA os is 20/40 - does best after AT's given     11/15/2022  - endorses compliance w latanoprost and combigan   - IOP 25//24   - trail of rocklatan ou - if cost too much - like $200 dollars can do dorzolamide instead // cost should be about $50- a month - 1 sample given     3/21/2023   Could not afford rocklatan  Adjust gtts   Latanoprost ou q hs   Combigna ou bid   Dorzolamide - bid ou  (written  for tid so can get more )     F/U  4 months  IOP // DFE / OCT

## 2023-08-03 ENCOUNTER — OFFICE VISIT (OUTPATIENT)
Dept: OPHTHALMOLOGY | Facility: CLINIC | Age: 88
End: 2023-08-03
Payer: MEDICARE

## 2023-08-03 DIAGNOSIS — Z98.890 S/P LASER IRIDOTOMY: ICD-10-CM

## 2023-08-03 DIAGNOSIS — H40.042 STEROID RESPONDER, LEFT EYE: ICD-10-CM

## 2023-08-03 DIAGNOSIS — H57.03 SMALL PUPILS: ICD-10-CM

## 2023-08-03 DIAGNOSIS — Z96.1 PSEUDOPHAKIA, LEFT EYE: ICD-10-CM

## 2023-08-03 DIAGNOSIS — H35.033 HYPERTENSIVE RETINOPATHY, BILATERAL: ICD-10-CM

## 2023-08-03 DIAGNOSIS — H34.8110 HEMISPHERIC RETINAL VEIN OCCLUSION WITH MACULAR EDEMA OF RIGHT EYE: ICD-10-CM

## 2023-08-03 DIAGNOSIS — H40.053 OCULAR HYPERTENSION, BILATERAL: ICD-10-CM

## 2023-08-03 DIAGNOSIS — H25.11 NUCLEAR SCLEROTIC CATARACT OF RIGHT EYE: ICD-10-CM

## 2023-08-03 DIAGNOSIS — H40.89 GLAUCOMA DUE TO COMBINATION OF MECHANISMS: Primary | ICD-10-CM

## 2023-08-03 DIAGNOSIS — H40.153: ICD-10-CM

## 2023-08-03 PROCEDURE — 99214 OFFICE O/P EST MOD 30 MIN: CPT | Mod: S$PBB,,, | Performed by: OPHTHALMOLOGY

## 2023-08-03 PROCEDURE — 99214 PR OFFICE/OUTPT VISIT, EST, LEVL IV, 30-39 MIN: ICD-10-PCS | Mod: S$PBB,,, | Performed by: OPHTHALMOLOGY

## 2023-08-03 PROCEDURE — 99213 OFFICE O/P EST LOW 20 MIN: CPT | Mod: PBBFAC | Performed by: OPHTHALMOLOGY

## 2023-08-03 PROCEDURE — 99999 PR PBB SHADOW E&M-EST. PATIENT-LVL III: ICD-10-PCS | Mod: PBBFAC,,, | Performed by: OPHTHALMOLOGY

## 2023-08-03 PROCEDURE — 99999 PR PBB SHADOW E&M-EST. PATIENT-LVL III: CPT | Mod: PBBFAC,,, | Performed by: OPHTHALMOLOGY

## 2023-08-03 RX ORDER — AMLODIPINE BESYLATE 5 MG/1
5 TABLET ORAL DAILY
COMMUNITY
Start: 2023-07-12

## 2023-08-03 RX ORDER — ONDANSETRON 4 MG/1
4 TABLET, ORALLY DISINTEGRATING ORAL EVERY 6 HOURS PRN
COMMUNITY
Start: 2023-03-31

## 2023-08-03 RX ORDER — LINACLOTIDE 72 UG/1
72 CAPSULE, GELATIN COATED ORAL DAILY PRN
COMMUNITY
Start: 2023-04-22

## 2023-08-25 DIAGNOSIS — H40.89 GLAUCOMA DUE TO COMBINATION OF MECHANISMS: ICD-10-CM

## 2023-08-25 DIAGNOSIS — H40.153: ICD-10-CM

## 2023-08-25 RX ORDER — LATANOPROST 50 UG/ML
SOLUTION/ DROPS OPHTHALMIC
Qty: 7.5 ML | Refills: 3 | Status: SHIPPED | OUTPATIENT
Start: 2023-08-25

## 2023-09-15 DIAGNOSIS — G89.29 CHRONIC MIDLINE LOW BACK PAIN WITHOUT SCIATICA: ICD-10-CM

## 2023-09-15 DIAGNOSIS — M54.50 CHRONIC MIDLINE LOW BACK PAIN WITHOUT SCIATICA: ICD-10-CM

## 2023-09-15 DIAGNOSIS — I10 ESSENTIAL HYPERTENSION: ICD-10-CM

## 2023-09-15 DIAGNOSIS — K21.9 GERD (GASTROESOPHAGEAL REFLUX DISEASE): ICD-10-CM

## 2023-09-15 RX ORDER — PRAVASTATIN SODIUM 10 MG/1
10 TABLET ORAL DAILY
Qty: 90 TABLET | Refills: 3 | OUTPATIENT
Start: 2023-09-15

## 2023-09-15 RX ORDER — MELOXICAM 7.5 MG/1
7.5 TABLET ORAL DAILY
Qty: 90 TABLET | Refills: 3 | Status: CANCELLED | OUTPATIENT
Start: 2023-09-15

## 2023-09-15 RX ORDER — ATENOLOL 50 MG/1
50 TABLET ORAL 3 TIMES DAILY
Qty: 270 TABLET | Refills: 3 | Status: CANCELLED | OUTPATIENT
Start: 2023-09-15

## 2023-09-15 RX ORDER — HYDROCHLOROTHIAZIDE 25 MG/1
25 TABLET ORAL DAILY
Qty: 90 TABLET | Refills: 3 | Status: CANCELLED | OUTPATIENT
Start: 2023-09-15

## 2023-09-15 RX ORDER — ATENOLOL 50 MG/1
TABLET ORAL
Qty: 270 TABLET | Refills: 3 | OUTPATIENT
Start: 2023-09-15

## 2023-09-15 RX ORDER — MELOXICAM 7.5 MG/1
7.5 TABLET ORAL DAILY
Qty: 90 TABLET | Refills: 3 | OUTPATIENT
Start: 2023-09-15

## 2023-09-15 RX ORDER — LISINOPRIL 20 MG/1
TABLET ORAL
Qty: 90 TABLET | Refills: 1 | OUTPATIENT
Start: 2023-09-15

## 2023-09-15 RX ORDER — LISINOPRIL 20 MG/1
20 TABLET ORAL DAILY
Qty: 90 TABLET | Refills: 3 | Status: CANCELLED | OUTPATIENT
Start: 2023-09-15

## 2023-09-15 RX ORDER — PRAVASTATIN SODIUM 10 MG/1
10 TABLET ORAL DAILY
Qty: 90 TABLET | Refills: 3 | Status: CANCELLED | OUTPATIENT
Start: 2023-09-15

## 2023-09-15 RX ORDER — OMEPRAZOLE 20 MG/1
20 CAPSULE, DELAYED RELEASE ORAL DAILY
Qty: 90 CAPSULE | Refills: 3 | Status: CANCELLED | OUTPATIENT
Start: 2023-09-15

## 2023-09-15 RX ORDER — HYDROCHLOROTHIAZIDE 25 MG/1
25 TABLET ORAL DAILY
Qty: 90 TABLET | Refills: 3 | OUTPATIENT
Start: 2023-09-15

## 2023-09-15 RX ORDER — OMEPRAZOLE 20 MG/1
20 CAPSULE, DELAYED RELEASE ORAL DAILY
Qty: 90 CAPSULE | Refills: 3 | OUTPATIENT
Start: 2023-09-15

## 2023-09-15 NOTE — TELEPHONE ENCOUNTER
Refill Routing Note   Medication(s) are not appropriate for processing by [facility] for the following reason(s):      Non-participating provider:     [facility] action(s):  Route Care Due:  Appointment due   Medication Therapy Plan:         Appointments  past 12m or future 3m with PCP    Date Provider   Last Visit   Visit date not found Meenakshi Blake DNP   Next Visit   Visit date not found Meenakshi Blake DNP   ED visits in past 90 days: 0        Note composed:8:55 AM 09/15/2023

## 2023-09-28 DIAGNOSIS — H40.153: ICD-10-CM

## 2023-09-28 DIAGNOSIS — H40.89 GLAUCOMA DUE TO COMBINATION OF MECHANISMS: ICD-10-CM

## 2023-09-28 RX ORDER — BRIMONIDINE TARTRATE AND TIMOLOL MALEATE 2; 5 MG/ML; MG/ML
1 SOLUTION OPHTHALMIC 2 TIMES DAILY
Qty: 5 ML | Refills: 3 | Status: SHIPPED | OUTPATIENT
Start: 2023-09-28 | End: 2023-12-11 | Stop reason: SDUPTHER

## 2023-12-07 NOTE — PROGRESS NOTES
HPI    DLS: 8/03/2023    Pt here for 4 Month Check/DFE:  Pt states no eye pain or discomfort. Pt denies any vision changes. Pt   states he has a hard time driving at night with the lights. Pt states he   needs refills on all his eye drops.     Meds;  Combigan BID OU   Dorzolamide BID OU   Latanoprost QHS OU   AT's PRN OU     1. MMG   2. Hx Narrow Angles OU   3. NS OD   4. Type 2 DM no BDR   5. Hx Tumor/Cancer RLL   6. PCIOL OS   7. HRVO with ME vs BRVO OD     Last edited by Ashli Salazar on 12/11/2023 10:43 AM.              Assessment /Plan     For exam results, see Encounter Report.    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    Residual stage of open-angle glaucoma, mild stage, bilateral    Steroid responder, left eye    Ocular hypertension, bilateral    Hemispheric retinal vein occlusion with macular edema of right eye    Nuclear sclerotic cataract of right eye    Pseudophakia, left eye    Small pupils    S/P laser iridotomy        RETIRED FROM THE [employer] // STILL HAS A [place] THAT HE FARMS   VERY ACTIVE STILL          Glaucoma (type and duration)    Narrow angles - S/P PI's ou // + residual open angle glaucoma -   First HVF   8/2018 - poor VF test taker    First photos   6/2018   Treatment / Drops started   Lat ou - started 6/15/2018            Family history    ?        Glaucoma meds    Latanoprost ou - started 6/15/2018         H/O adverse rxn to glaucoma drops    none        LASERS    Laser PI's 4/2018 - od // OS 5/2018         GLAUCOMA SURGERIES    none        OTHER EYE SURGERIES    Phaco/IOL os - 12/16/2020        CDR    0.7/0.4         Tbase    19-27  od // 22-40 os         Tmax     27/40          Ttarget    ?             HVF    3 test 2018 to  2021 - unreliable  od // unreliable  Os (improved ou)         Gonio    +3 w/ steep approach - post PI's ou         CCT    518 / 528        OCT    3 test 2018 to 2022 - RNFL - Full od // dec G/TS os ((+ CME od - chronic)         HRT    2 test 2018 to 2019  - MR - high std dev. od // high std dev os        Disc photos    6/2018     - Ttoday   16/16  (IOP creeping up - reports good compliance with drops)   -Test today - iop / OCT     2. NS OD      (also right eye has a HRVO so ? potential    ON FLOWMAX    Had a lot of corneal surface changes and slow recovery of vision post CE os - eventually did well     3. Diabetes    No BDR seen on exam 6/15/2018     4. H/O tumor / cancer RLL    S/P removal and re-construction at [facility]    The biopsy was done by amanda - but the surgery was done by some one else     5. Has some abdominal issues    Having surgery next week - 3/15/2018     6. Episopde of blurry vision ou    At [facility] 2018 - resolved    7. Hemiretinal vein occulusion (superior)  with macular edema, right eye vs sup BRVO w/   No NVI   On ASA 81 mg    Following with Mazzulla    - has seen Mazzulla and no CME at that time, to f/u in 3 months    8. PC IOL os - complex - shallow AC / floppy iris / xioym ring - PCB00 23.5- 12/16/2020   Had slow visual recovery - got a lot of corneal surface changes    Eventually recovered to 20/40         PLAN   For MMG  IOP up od    - CPM with latanoprost qHS OU  - change combigan from OS to OU     -  IV injection OD with Mazzulla -- 12/22/2020 - for BRVO w/ ME     Ok to use +2.75 readers     IOP ok with present drops  BCVA os is 20/40 - does best after AT's given     11/15/2022  - endorses compliance w latanoprost and combigan   - IOP 25//24   - trail of rocklatan ou - if cost too much - like $200 dollars can do dorzolamide instead // cost should be about $50- a month - 1 sample given     3/21/2023   Could not afford rocklatan  Adjust gtts   Latanoprost ou q hs   Combigan ou bid   Dorzolamide - bid ou  (written  for tid so can get more )     12/11/2023   + worsening cme od - chronic - 2/2 HRVO w/ ME   Saw mazzulla 5/2022 and pt was not interested in further intervention or injections - monitoring only   OS is the better seeing eye      F/U 4 months - IOP / gonio

## 2023-12-11 ENCOUNTER — OFFICE VISIT (OUTPATIENT)
Dept: OPHTHALMOLOGY | Facility: CLINIC | Age: 88
End: 2023-12-11
Payer: MEDICARE

## 2023-12-11 DIAGNOSIS — H40.89 GLAUCOMA DUE TO COMBINATION OF MECHANISMS: Primary | ICD-10-CM

## 2023-12-11 DIAGNOSIS — H40.053 OCULAR HYPERTENSION, BILATERAL: ICD-10-CM

## 2023-12-11 DIAGNOSIS — H57.03 SMALL PUPILS: ICD-10-CM

## 2023-12-11 DIAGNOSIS — H40.89 GLAUCOMA DUE TO COMBINATION OF MECHANISMS: ICD-10-CM

## 2023-12-11 DIAGNOSIS — Z96.1 PSEUDOPHAKIA, LEFT EYE: ICD-10-CM

## 2023-12-11 DIAGNOSIS — H25.11 NUCLEAR SCLEROTIC CATARACT OF RIGHT EYE: ICD-10-CM

## 2023-12-11 DIAGNOSIS — H34.8110 HEMISPHERIC RETINAL VEIN OCCLUSION WITH MACULAR EDEMA OF RIGHT EYE: ICD-10-CM

## 2023-12-11 DIAGNOSIS — H40.153: ICD-10-CM

## 2023-12-11 DIAGNOSIS — Z98.890 S/P LASER IRIDOTOMY: ICD-10-CM

## 2023-12-11 DIAGNOSIS — H40.042 STEROID RESPONDER, LEFT EYE: ICD-10-CM

## 2023-12-11 PROCEDURE — 99999 PR PBB SHADOW E&M-EST. PATIENT-LVL III: CPT | Mod: PBBFAC,,, | Performed by: OPHTHALMOLOGY

## 2023-12-11 PROCEDURE — 92133 POSTERIOR SEGMENT OCT OPTIC NERVE(OCULAR COHERENCE TOMOGRAPHY) - OU - BOTH EYES: ICD-10-PCS | Mod: 26,S$PBB,, | Performed by: OPHTHALMOLOGY

## 2023-12-11 PROCEDURE — 99214 PR OFFICE/OUTPT VISIT, EST, LEVL IV, 30-39 MIN: ICD-10-PCS | Mod: S$PBB,,, | Performed by: OPHTHALMOLOGY

## 2023-12-11 PROCEDURE — 99214 OFFICE O/P EST MOD 30 MIN: CPT | Mod: S$PBB,,, | Performed by: OPHTHALMOLOGY

## 2023-12-11 PROCEDURE — 99999 PR PBB SHADOW E&M-EST. PATIENT-LVL III: ICD-10-PCS | Mod: PBBFAC,,, | Performed by: OPHTHALMOLOGY

## 2023-12-11 PROCEDURE — 92133 CPTRZD OPH DX IMG PST SGM ON: CPT | Mod: PBBFAC | Performed by: OPHTHALMOLOGY

## 2023-12-11 PROCEDURE — 99213 OFFICE O/P EST LOW 20 MIN: CPT | Mod: PBBFAC | Performed by: OPHTHALMOLOGY

## 2023-12-11 RX ORDER — DORZOLAMIDE HCL 20 MG/ML
1 SOLUTION/ DROPS OPHTHALMIC 2 TIMES DAILY
Qty: 20 ML | Refills: 3 | Status: SHIPPED | OUTPATIENT
Start: 2023-12-11

## 2023-12-11 RX ORDER — BRIMONIDINE TARTRATE AND TIMOLOL MALEATE 2; 5 MG/ML; MG/ML
1 SOLUTION OPHTHALMIC 2 TIMES DAILY
COMMUNITY
Start: 2023-12-11 | End: 2023-12-11 | Stop reason: SDUPTHER

## 2023-12-11 RX ORDER — BRIMONIDINE TARTRATE AND TIMOLOL MALEATE 2; 5 MG/ML; MG/ML
1 SOLUTION OPHTHALMIC 2 TIMES DAILY
Qty: 15 ML | Refills: 3 | Status: SHIPPED | OUTPATIENT
Start: 2023-12-11

## 2023-12-11 RX ORDER — BRIMONIDINE TARTRATE AND TIMOLOL MALEATE 2; 5 MG/ML; MG/ML
1 SOLUTION OPHTHALMIC
OUTPATIENT
Start: 2023-12-11 | End: 2024-12-10

## 2023-12-11 RX ORDER — BRIMONIDINE TARTRATE AND TIMOLOL MALEATE 2; 5 MG/ML; MG/ML
1 SOLUTION OPHTHALMIC 2 TIMES DAILY
Qty: 15 ML | Refills: 3 | Status: SHIPPED | OUTPATIENT
Start: 2023-12-11 | End: 2023-12-11 | Stop reason: ALTCHOICE

## 2024-04-12 NOTE — PROGRESS NOTES
HPI    DLS: 12/11/2023    Pt here for 4 Month Check iop and gonio  Pt states his eyes have been getting more red more often and somewhat   bothersome at times.     Meds;  Combigan BID OU   Dorzolamide BID OU   Latanoprost QHS OU   AT's PRN OU     1. MMG   2. Hx Narrow Angles OU   3. NS OD   4. Type 2 DM no BDR   5. Hx Tumor/Cancer RLL   6. PCIOL OS   7. HRVO with ME vs BRVO OD     Last edited by Marie Holman on 4/19/2024 10:06 AM.            Assessment /Plan     For exam results, see Encounter Report.    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    Residual stage of open-angle glaucoma, mild stage, bilateral    Steroid responder, left eye    Ocular hypertension, bilateral    Hemispheric retinal vein occlusion with macular edema of right eye    Nuclear sclerotic cataract of right eye    Pseudophakia, left eye    Small pupils    S/P laser iridotomy        RETIRED FROM THE [employer] // STILL HAS A [place] THAT HE FARMS   VERY ACTIVE STILL          Glaucoma (type and duration)    Narrow angles - S/P PI's ou // + residual open angle glaucoma -   First HVF   8/2018 - poor VF test taker    First photos   6/2018   Treatment / Drops started   Lat ou - started 6/15/2018            Family history    ?        Glaucoma meds    Latanoprost ou - started 6/15/2018         H/O adverse rxn to glaucoma drops    none        LASERS    Laser PI's 4/2018 - od // OS 5/2018         GLAUCOMA SURGERIES    none        OTHER EYE SURGERIES    Phaco/IOL os - 12/16/2020        CDR    0.7/0.4         Tbase    19-27  od // 22-40 os         Tmax     27/40          Ttarget    ?             HVF    3 test 2018 to  2021 - unreliable  od // unreliable  Os (improved ou)         Gonio    +3 w/ steep approach - post PI's ou         CCT    518 / 528        OCT    3 test 2018 to 2022 - RNFL - Full od // dec G/TS os ((+ CME od - chronic)         HRT    2 test 2018 to 2019 - MR - high std dev. od // high std dev os        Disc photos    6/2018     -  Ttoday   15/14  (IOP creeping up - reports good compliance with drops)   -Test today - iop / gonio      2. NS OD      (also right eye has a HRVO so ? potential    ON FLOWMAX    Had a lot of corneal surface changes and slow recovery of vision post CE os - eventually did well     3. Diabetes    No BDR seen on exam 6/15/2018     4. H/O tumor / cancer RLL    S/P removal and re-construction at [facility]    The biopsy was done by amanda - but the surgery was done by some one else     5. Has some abdominal issues    Having surgery next week - 3/15/2018     6. Episopde of blurry vision ou    At [facility] 2018 - resolved    7. Hemiretinal vein occulusion (superior)  with macular edema, right eye vs sup BRVO w/   No NVI   On ASA 81 mg    Following with Mazzulla    - has seen Mazzulla and no CME at that time, to f/u in 3 months    8. PC IOL os - complex - shallow AC / floppy iris / xiomy ring - PCB00 23.5- 12/16/2020   Had slow visual recovery - got a lot of corneal surface changes    Eventually recovered to 20/40         PLAN   For MMG  IOP up od    - CPM with latanoprost qHS OU  - change combigan from OS to OU     -  IV injection OD with Mazzulla -- 12/22/2020 - for BRVO w/ ME     Ok to use +2.75 readers     IOP ok with present drops  BCVA os is 20/40 - does best after AT's given     11/15/2022  - endorses compliance w latanoprost and combigan   - IOP 25//24   - trail of rocklatan ou - if cost too much - like $200 dollars can do dorzolamide instead // cost should be about $50- a month - 1 sample given     3/21/2023   Could not afford rocklatan  Adjust gtts   Latanoprost ou q hs   Combigan ou bid   Dorzolamide - bid ou  (written  for tid so can get more )     12/11/2023   + worsening cme od - chronic - 2/2 HRVO w/ ME   Saw mazzulla 5/2022 and pt was not interested in further intervention or injections - monitoring only   OS is the better seeing eye     F/U 4 months - IOP / HVF 24-2 ss OS only // DFE // OCT

## 2024-04-19 ENCOUNTER — OFFICE VISIT (OUTPATIENT)
Dept: OPHTHALMOLOGY | Facility: CLINIC | Age: 89
End: 2024-04-19
Payer: MEDICARE

## 2024-04-19 DIAGNOSIS — H34.8110 HEMISPHERIC RETINAL VEIN OCCLUSION WITH MACULAR EDEMA OF RIGHT EYE: ICD-10-CM

## 2024-04-19 DIAGNOSIS — H40.89 GLAUCOMA DUE TO COMBINATION OF MECHANISMS: Primary | ICD-10-CM

## 2024-04-19 DIAGNOSIS — Z98.890 S/P LASER IRIDOTOMY: ICD-10-CM

## 2024-04-19 DIAGNOSIS — Z96.1 PSEUDOPHAKIA, LEFT EYE: ICD-10-CM

## 2024-04-19 DIAGNOSIS — H57.03 SMALL PUPILS: ICD-10-CM

## 2024-04-19 DIAGNOSIS — H40.053 OCULAR HYPERTENSION, BILATERAL: ICD-10-CM

## 2024-04-19 DIAGNOSIS — H25.11 NUCLEAR SCLEROTIC CATARACT OF RIGHT EYE: ICD-10-CM

## 2024-04-19 DIAGNOSIS — H40.153: ICD-10-CM

## 2024-04-19 DIAGNOSIS — H40.042 STEROID RESPONDER, LEFT EYE: ICD-10-CM

## 2024-04-19 PROCEDURE — 99999 PR PBB SHADOW E&M-EST. PATIENT-LVL III: CPT | Mod: PBBFAC,,, | Performed by: OPHTHALMOLOGY

## 2024-04-19 PROCEDURE — 99213 OFFICE O/P EST LOW 20 MIN: CPT | Mod: PBBFAC | Performed by: OPHTHALMOLOGY

## 2024-04-19 PROCEDURE — 99214 OFFICE O/P EST MOD 30 MIN: CPT | Mod: S$PBB,,, | Performed by: OPHTHALMOLOGY

## 2024-04-19 RX ORDER — DORZOLAMIDE HCL 20 MG/ML
1 SOLUTION/ DROPS OPHTHALMIC 2 TIMES DAILY
Qty: 20 ML | Refills: 3 | Status: SHIPPED | OUTPATIENT
Start: 2024-04-19 | End: 2024-05-14 | Stop reason: SDUPTHER

## 2024-04-19 RX ORDER — LATANOPROST 50 UG/ML
1 SOLUTION/ DROPS OPHTHALMIC DAILY
Qty: 7.5 ML | Refills: 3 | Status: SHIPPED | OUTPATIENT
Start: 2024-04-19 | End: 2024-05-14 | Stop reason: SDUPTHER

## 2024-04-19 RX ORDER — BRIMONIDINE TARTRATE AND TIMOLOL MALEATE 2; 5 MG/ML; MG/ML
1 SOLUTION OPHTHALMIC 2 TIMES DAILY
Qty: 15 ML | Refills: 3 | Status: SHIPPED | OUTPATIENT
Start: 2024-04-19 | End: 2024-05-14 | Stop reason: SDUPTHER

## 2024-05-14 DIAGNOSIS — H40.89 GLAUCOMA DUE TO COMBINATION OF MECHANISMS: ICD-10-CM

## 2024-05-14 DIAGNOSIS — H40.153: ICD-10-CM

## 2024-05-14 RX ORDER — DORZOLAMIDE HCL 20 MG/ML
1 SOLUTION/ DROPS OPHTHALMIC 2 TIMES DAILY
Qty: 30 ML | Refills: 3 | Status: SHIPPED | OUTPATIENT
Start: 2024-05-14

## 2024-05-14 RX ORDER — BRIMONIDINE TARTRATE AND TIMOLOL MALEATE 2; 5 MG/ML; MG/ML
1 SOLUTION OPHTHALMIC 2 TIMES DAILY
Qty: 30 ML | Refills: 3 | Status: SHIPPED | OUTPATIENT
Start: 2024-05-14

## 2024-05-14 RX ORDER — LATANOPROST 50 UG/ML
1 SOLUTION/ DROPS OPHTHALMIC DAILY
Qty: 7.5 ML | Refills: 3 | Status: SHIPPED | OUTPATIENT
Start: 2024-05-14

## 2024-08-13 NOTE — PROGRESS NOTES
HPI    DLS: 4/19/2024    Pt here for HVF review/OCT;  Pt states he is allergic to sulfa and the Dorzolamide eye drops and having   trouble getting his eye drops due to insurance issues.     Meds;  Combigan BID OU  Dorzolamide BID OU  Latanoprost QHS OU  AT's PRN OU    1. MMG   2. Hx N arrow Angles OU   3. NS OD   4. Type 2 DM no BDR   5. Hx Tumor/Cancer RLL   6. PCIOL OS   7. 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            Assessment /Plan     For exam results, see Encounter Report.    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  Dispense: 10 mL; Refill: 3  -     dorzolamide (TRUSOPT) 2 % ophthalmic solution; Place 1 drop into both eyes 3 (three) times daily.  Dispense: 30 mL; Refill: 3  -     brimonidine-timoloL (COMBIGAN) 0.2-0.5 % Drop; Place 1 drop into both eyes 2 (two) times a day.  Dispense: 20 mL; Refill: 3    Residual stage of open-angle glaucoma, mild stage, bilateral  -     latanoprost 0.005 % ophthalmic solution; Place 1 drop into both eyes once daily.  Dispense: 10 mL; Refill: 3  -     dorzolamide (TRUSOPT) 2 % ophthalmic solution; Place 1 drop into both eyes 3 (three) times daily.  Dispense: 30 mL; Refill: 3  -     brimonidine-timoloL (COMBIGAN) 0.2-0.5 % Drop; Place 1 drop into both eyes 2 (two) times a day.  Dispense: 20 mL; Refill: 3    Steroid responder, left eye    Ocular hypertension, bilateral    Hemispheric retinal vein occlusion with macular edema of right eye    Nuclear sclerotic cataract of right eye    Small pupils    Pseudophakia, left eye    S/P laser iridotomy        RETIRED FROM THE [employer] // STILL HAS A [employer] THAT HE FARMS   VERY ACTIVE STILL          Glaucoma (type and duration)    Narrow angles - S/P PI's ou // + residual open angle glaucoma -   First HVF   8/2018 - poor VF test taker    First photos   6/2018   Treatment / Drops started   Lat ou  - started 6/15/2018            Family history    ?        Glaucoma meds    Latanoprost ou - started 6/15/2018         H/O adverse rxn to glaucoma drops    none        LASERS    Laser PI's 4/2018 - od // OS 5/2018         GLAUCOMA SURGERIES    none        OTHER EYE SURGERIES    Phaco/IOL os - 12/16/2020        CDR    0.7/0.4         Tbase    19-27  od // 22-40 os         Tmax     27/40          Ttarget    ?             HVF    4 test 2018 to  2024 - unreliable - od  (no further testing od) // SAD / IAD - unreliable  Os (improved ou)         Gonio    +3 w/ steep approach - post PI's ou         CCT    518 / 528        OCT    4 test 2018 to 2024 - RNFL - bord TS/TI od // dec G/TS os ((+ CME od - chronic)         HRT    2 test 2018 to 2019 - MR - high std dev. od // high std dev os        Disc photos    6/2018     - Ttoday   15/15  (IOP creeping up - reports good compliance with drops)   -Test today - iop / HVF - os // DFE // OCT     2. NS OD      (also right eye has a HRVO so ? potential    ON FLOWMAX    Had a lot of corneal surface changes and slow recovery of vision post CE os - eventually did well     3. Diabetes    No BDR seen on exam 6/15/2018     4. H/O tumor / cancer RLL    S/P removal and re-construction at [facility]    The biopsy was done by amanda - but the surgery was done by some one else     5. Has some abdominal issues    Having surgery next week - 3/15/2018     6. Episopde of blurry vision ou    At [facility] 2018 - resolved    7. Hemiretinal vein occulusion (superior)  with macular edema, right eye vs sup BRVO w/   No NVI   On ASA 81 mg    Following with Mazzulla    - has seen Mazzulla and no CME at that time, to f/u in 3 months    8. PC IOL os - complex - shallow AC / floppy iris / xiomy ring - PCB00 23.5- 12/16/2020   Had slow visual recovery - got a lot of corneal surface changes    Eventually recovered to 20/40         PLAN   For MMG  IOP up od    - CPM with latanoprost qHS OU  - change combigan from OS  to OU     -  IV injection OD with Mazzulla -- 12/22/2020 - for BRVO w/ ME     Ok to use +2.75 readers     IOP ok with present drops  BCVA os is 20/40 - does best after AT's given     3/21/2023   Could not afford rocklatan  Adjust gtts   Latanoprost ou q hs   Combigan ou bid   Dorzolamide - bid ou  (written  for tid so can get more )     12/11/2023   + worsening cme od - chronic - 2/2 HRVO w/ ME   Saw mazzulla 5/2022 and pt was not interested in further intervention or injections - monitoring only   OS is the better seeing eye     8/19/2024   IOP ok   Having trouble getting enough drops out of the pharmacy / insurance people  Rewrote scripts - suggest use [facility] - instead of [facility]   Latanoprost - qhs ou ( request 4 bottles for 90 days)   Dorzolamide - written tid ou - request 30 mls for 90 days (ok to use bid)   Combigan - bid ou - request 20 mls for 90 days     H/U 4-5 months - IOP check - ask if he is getting enough meds for 90 days now with mail order service

## 2024-08-19 ENCOUNTER — OFFICE VISIT (OUTPATIENT)
Dept: OPHTHALMOLOGY | Facility: CLINIC | Age: 89
End: 2024-08-19
Payer: MEDICARE

## 2024-08-19 ENCOUNTER — CLINICAL SUPPORT (OUTPATIENT)
Dept: OPHTHALMOLOGY | Facility: CLINIC | Age: 89
End: 2024-08-19
Payer: MEDICARE

## 2024-08-19 DIAGNOSIS — H40.153: ICD-10-CM

## 2024-08-19 DIAGNOSIS — H40.89 GLAUCOMA DUE TO COMBINATION OF MECHANISMS: ICD-10-CM

## 2024-08-19 DIAGNOSIS — H40.042 STEROID RESPONDER, LEFT EYE: ICD-10-CM

## 2024-08-19 DIAGNOSIS — Z96.1 PSEUDOPHAKIA, LEFT EYE: ICD-10-CM

## 2024-08-19 DIAGNOSIS — H57.03 SMALL PUPILS: ICD-10-CM

## 2024-08-19 DIAGNOSIS — H40.053 OCULAR HYPERTENSION, BILATERAL: ICD-10-CM

## 2024-08-19 DIAGNOSIS — H40.89 GLAUCOMA DUE TO COMBINATION OF MECHANISMS: Primary | ICD-10-CM

## 2024-08-19 DIAGNOSIS — H25.11 NUCLEAR SCLEROTIC CATARACT OF RIGHT EYE: ICD-10-CM

## 2024-08-19 DIAGNOSIS — Z98.890 S/P LASER IRIDOTOMY: ICD-10-CM

## 2024-08-19 DIAGNOSIS — H34.8110 HEMISPHERIC RETINAL VEIN OCCLUSION WITH MACULAR EDEMA OF RIGHT EYE: ICD-10-CM

## 2024-08-19 PROCEDURE — 99999 PR PBB SHADOW E&M-EST. PATIENT-LVL III: CPT | Mod: PBBFAC,,, | Performed by: OPHTHALMOLOGY

## 2024-08-19 PROCEDURE — 99214 OFFICE O/P EST MOD 30 MIN: CPT | Mod: S$PBB,,, | Performed by: OPHTHALMOLOGY

## 2024-08-19 PROCEDURE — 99213 OFFICE O/P EST LOW 20 MIN: CPT | Mod: PBBFAC | Performed by: OPHTHALMOLOGY

## 2024-08-19 RX ORDER — LATANOPROST 50 UG/ML
1 SOLUTION/ DROPS OPHTHALMIC DAILY
Qty: 10 ML | Refills: 3 | Status: SHIPPED | OUTPATIENT
Start: 2024-08-19

## 2024-08-19 RX ORDER — BRIMONIDINE TARTRATE AND TIMOLOL MALEATE 2; 5 MG/ML; MG/ML
1 SOLUTION OPHTHALMIC 2 TIMES DAILY
Qty: 20 ML | Refills: 3 | Status: SHIPPED | OUTPATIENT
Start: 2024-08-19

## 2024-08-19 RX ORDER — DORZOLAMIDE HCL 20 MG/ML
1 SOLUTION/ DROPS OPHTHALMIC 3 TIMES DAILY
Qty: 30 ML | Refills: 3 | Status: SHIPPED | OUTPATIENT
Start: 2024-08-19

## 2024-08-19 NOTE — PROGRESS NOTES
OCT/HVF done./rel/fix poor os coop fair eye movement os./ chart checked for latex allergy./ plano/os-smh

## 2025-05-06 ENCOUNTER — TELEPHONE (OUTPATIENT)
Dept: OPHTHALMOLOGY | Facility: CLINIC | Age: OVER 89
End: 2025-05-06
Payer: MEDICARE

## 2025-05-06 NOTE — TELEPHONE ENCOUNTER
We received a refill request from [facility] for pt's eyedrops. I called pt because he has Rx that was sent to [facility]. Pt states that he is now seeing an outside ophthalmologist b/c he could not get in to see .

## 2025-06-14 NOTE — TELEPHONE ENCOUNTER
No new care gaps identified.  Powered by [employer]. Reference number: [id number].   2/21/2022 9:53:11 AM CST  
 Patient is a  70 year old male with past medical history of  COPD (on 2L home O2), DM 2  HFrEF 30%, CAD,  ischemic cardiomyopathy s/p CRT-D, HTN, paroxysmal A-fib (status post ablation 1/14/25) on amiodarone and  Eliquis who presented to ED w c/o LESLIE and b/l  leg swelling.  Patient said it started 2 days ago and felt like his previous heart failure exacerbations. Patient denied n/v/f/c; denied HA lightheadedness; denied palpitations cough CP (now resolved on admission).

## (undated) DEVICE — SUT VICRYL PLUS 4-0 P3 18IN

## (undated) DEVICE — DRESSING TRANS 4X4 TEGADERM

## (undated) DEVICE — NDL HYPO REG 25G X 1 1/2

## (undated) DEVICE — CLOSURE SKIN STERI STRIP 1/2X4

## (undated) DEVICE — SEE MEDLINE ITEM 157148

## (undated) DEVICE — GOWN SURGICAL X-LARGE

## (undated) DEVICE — TRAY MINOR GEN SURG

## (undated) DEVICE — SEE MEDLINE ITEM 157131

## (undated) DEVICE — DRAIN PENROSE XRAY 12 X 1/4 ST

## (undated) DEVICE — DRESSING EYE OVAL LF

## (undated) DEVICE — ELECTRODE REM PLYHSV RETURN 9

## (undated) DEVICE — ADHESIVE MASTISOL VIAL 48/BX

## (undated) DEVICE — SUT VICRYL 3-0 27 SH

## (undated) DEVICE — SYR 3CC 21 X 1 LUER LOCK

## (undated) DEVICE — SHIELD COLLAGEN 12HR CORNEAL

## (undated) DEVICE — SOL BETADINE 5%

## (undated) DEVICE — SUT 2-0 VICRYL / SH (J417)

## (undated) DEVICE — SEE MEDLINE ITEM 157117

## (undated) DEVICE — SUT PROLENE 0 MO6 30IN BLUE

## (undated) DEVICE — DRESSING TELFA PAD N ADH 2X3

## (undated) DEVICE — GLOVE BIOGEL ECLIPSE SZ 6.5

## (undated) DEVICE — KIT GREY EYE

## (undated) DEVICE — SOL IRR BSS OPHTH 500ML STRL

## (undated) DEVICE — SOL WATER STRL IRR 1000ML

## (undated) DEVICE — DRAPE STERI-DRAPE APERTURE

## (undated) DEVICE — DRESSING TELFA STRL 4X3 LF

## (undated) DEVICE — SHEILD PLASTIC EYE

## (undated) DEVICE — RING MALUYGEN

## (undated) DEVICE — Device

## (undated) DEVICE — APPLICATOR CHLORAPREP ORN 26ML